# Patient Record
Sex: FEMALE | Race: WHITE | NOT HISPANIC OR LATINO | Employment: FULL TIME | URBAN - METROPOLITAN AREA
[De-identification: names, ages, dates, MRNs, and addresses within clinical notes are randomized per-mention and may not be internally consistent; named-entity substitution may affect disease eponyms.]

---

## 2017-07-19 ENCOUNTER — CLINICAL SUPPORT (OUTPATIENT)
Dept: OCCUPATIONAL MEDICINE | Facility: CLINIC | Age: 22
End: 2017-07-19

## 2017-07-19 DIAGNOSIS — Z00.00 PHYSICAL EXAM: ICD-10-CM

## 2017-07-19 PROCEDURE — 99080 SPECIAL REPORTS OR FORMS: CPT | Mod: S$GLB,,, | Performed by: PREVENTIVE MEDICINE

## 2017-07-19 PROCEDURE — 20999 UNLISTED PX MUSCSKEL GENERAL: CPT | Mod: S$GLB,,, | Performed by: PREVENTIVE MEDICINE

## 2017-07-19 PROCEDURE — 99499 UNLISTED E&M SERVICE: CPT | Mod: S$GLB,,, | Performed by: PREVENTIVE MEDICINE

## 2017-07-19 PROCEDURE — 80305 DRUG TEST PRSMV DIR OPT OBS: CPT | Mod: S$GLB,,, | Performed by: PREVENTIVE MEDICINE

## 2017-07-19 PROCEDURE — 86901 BLOOD TYPING SEROLOGIC RH(D): CPT | Mod: S$GLB,,, | Performed by: PREVENTIVE MEDICINE

## 2017-07-19 PROCEDURE — 92552 PURE TONE AUDIOMETRY AIR: CPT | Mod: S$GLB,,, | Performed by: PREVENTIVE MEDICINE

## 2017-10-19 ENCOUNTER — OFFICE VISIT (OUTPATIENT)
Dept: OBSTETRICS AND GYNECOLOGY | Facility: CLINIC | Age: 22
End: 2017-10-19
Attending: OBSTETRICS & GYNECOLOGY
Payer: COMMERCIAL

## 2017-10-19 VITALS
BODY MASS INDEX: 29.44 KG/M2 | HEIGHT: 69 IN | DIASTOLIC BLOOD PRESSURE: 78 MMHG | SYSTOLIC BLOOD PRESSURE: 110 MMHG | WEIGHT: 198.75 LBS

## 2017-10-19 DIAGNOSIS — Z12.4 ENCOUNTER FOR PAPANICOLAOU SMEAR FOR CERVICAL CANCER SCREENING: ICD-10-CM

## 2017-10-19 DIAGNOSIS — Z01.419 ENCOUNTER FOR GYNECOLOGICAL EXAMINATION (GENERAL) (ROUTINE) WITHOUT ABNORMAL FINDINGS: ICD-10-CM

## 2017-10-19 DIAGNOSIS — Z32.02 PREGNANCY EXAMINATION OR TEST, NEGATIVE RESULT: Primary | ICD-10-CM

## 2017-10-19 DIAGNOSIS — N89.8 VAGINAL DISCHARGE: ICD-10-CM

## 2017-10-19 DIAGNOSIS — R30.0 DYSURIA: ICD-10-CM

## 2017-10-19 LAB
B-HCG UR QL: NEGATIVE
BILIRUB SERPL-MCNC: ABNORMAL MG/DL
BLOOD URINE, POC: ABNORMAL
CANDIDA RRNA VAG QL PROBE: POSITIVE
COLOR, POC UA: YELLOW
CTP QC/QA: YES
G VAGINALIS RRNA GENITAL QL PROBE: NEGATIVE
GLUCOSE UR QL STRIP: NORMAL
KETONES UR QL STRIP: ABNORMAL
LEUKOCYTE ESTERASE URINE, POC: ABNORMAL
NITRITE, POC UA: ABNORMAL
PH, POC UA: 5
PROTEIN, POC: ABNORMAL
SPECIFIC GRAVITY, POC UA: 1.02
T VAGINALIS RRNA GENITAL QL PROBE: NEGATIVE
UROBILINOGEN, POC UA: NORMAL

## 2017-10-19 PROCEDURE — 88175 CYTOPATH C/V AUTO FLUID REDO: CPT

## 2017-10-19 PROCEDURE — 87480 CANDIDA DNA DIR PROBE: CPT

## 2017-10-19 PROCEDURE — 81002 URINALYSIS NONAUTO W/O SCOPE: CPT | Mod: S$GLB,,, | Performed by: OBSTETRICS & GYNECOLOGY

## 2017-10-19 PROCEDURE — 87660 TRICHOMONAS VAGIN DIR PROBE: CPT

## 2017-10-19 PROCEDURE — 99999 PR PBB SHADOW E&M-EST. PATIENT-LVL III: CPT | Mod: PBBFAC,,, | Performed by: OBSTETRICS & GYNECOLOGY

## 2017-10-19 PROCEDURE — 99385 PREV VISIT NEW AGE 18-39: CPT | Mod: 25,S$GLB,, | Performed by: OBSTETRICS & GYNECOLOGY

## 2017-10-19 PROCEDURE — 81025 URINE PREGNANCY TEST: CPT | Mod: S$GLB,,, | Performed by: OBSTETRICS & GYNECOLOGY

## 2017-10-19 RX ORDER — NORETHINDRONE AND ETHINYL ESTRADIOL AND FERROUS FUMARATE 0.8-25(24)
1 KIT ORAL DAILY
Qty: 84 TABLET | Refills: 3 | Status: SHIPPED | OUTPATIENT
Start: 2017-10-19 | End: 2017-12-20 | Stop reason: SDUPTHER

## 2017-10-19 RX ORDER — METRONIDAZOLE 7.5 MG/G
GEL VAGINAL
COMMUNITY
Start: 2015-03-25 | End: 2017-10-19

## 2017-10-19 RX ORDER — NORETHINDRONE AND ETHINYL ESTRADIOL AND FERROUS FUMARATE 0.8-25(24)
KIT ORAL
COMMUNITY
Start: 2014-09-18 | End: 2017-10-19
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
## 2017-10-23 RX ORDER — FLUCONAZOLE 150 MG/1
150 TABLET ORAL DAILY
Qty: 1 TABLET | Refills: 0 | Status: SHIPPED | OUTPATIENT
Start: 2017-10-23 | End: 2017-10-24

## 2017-10-24 ENCOUNTER — TELEPHONE (OUTPATIENT)
Dept: OBSTETRICS AND GYNECOLOGY | Facility: CLINIC | Age: 22
End: 2017-10-24

## 2017-10-24 NOTE — TELEPHONE ENCOUNTER
----- Message from Duyen Haney MD sent at 10/23/2017  5:25 PM CDT -----  Please call patient and tell her....    Your swab for vaginal infections came back positive for a yeast infection. I sent a prescription for Diflucan to the pharmacy for you. If you still have symptoms 1 week after you take the Diflucan or have any other questions then please contact the office.   Sincerely,  Dr. Haney

## 2017-10-26 ENCOUNTER — TELEPHONE (OUTPATIENT)
Dept: OBSTETRICS AND GYNECOLOGY | Facility: CLINIC | Age: 22
End: 2017-10-26

## 2017-10-26 NOTE — TELEPHONE ENCOUNTER
Let pt know she has yeast infection and prescription is at preferred pharmacy. Pt. Verbalized understanding and will call if she has any questions.

## 2017-10-26 NOTE — TELEPHONE ENCOUNTER
----- Message from Mary Olmedo MA sent at 10/24/2017  8:32 AM CDT -----  Please call patient and tell her....     Your swab for vaginal infections came back positive for a yeast infection. I sent a prescription for Diflucan to the pharmacy for you. If you still have symptoms 1 week after you take the Diflucan or have any other questions then please contact the office.   Sincerely,   Dr. Haney

## 2017-11-07 ENCOUNTER — TELEPHONE (OUTPATIENT)
Dept: OBSTETRICS AND GYNECOLOGY | Facility: CLINIC | Age: 22
End: 2017-11-07

## 2017-11-07 NOTE — TELEPHONE ENCOUNTER
Pt states she has been on her period since 10/19.  She is on OCPs that she just started this month.  Reassured her that it is normal to have BTB in the first 3 months of staring a new pill. She takes it at the same time daily and has not missed a pill. Verbalized understanding.

## 2017-11-07 NOTE — TELEPHONE ENCOUNTER
Medina pt - pt said she started her period a few days after her appt with  on 10/19 and it has not stopped. She would like to see why she has not stopped bleeding.

## 2017-12-20 ENCOUNTER — LAB VISIT (OUTPATIENT)
Dept: LAB | Facility: OTHER | Age: 22
End: 2017-12-20
Attending: OBSTETRICS & GYNECOLOGY
Payer: COMMERCIAL

## 2017-12-20 ENCOUNTER — OFFICE VISIT (OUTPATIENT)
Dept: OBSTETRICS AND GYNECOLOGY | Facility: CLINIC | Age: 22
End: 2017-12-20
Attending: OBSTETRICS & GYNECOLOGY
Payer: COMMERCIAL

## 2017-12-20 VITALS
SYSTOLIC BLOOD PRESSURE: 120 MMHG | BODY MASS INDEX: 37.29 KG/M2 | WEIGHT: 202.63 LBS | DIASTOLIC BLOOD PRESSURE: 82 MMHG | HEIGHT: 62 IN

## 2017-12-20 DIAGNOSIS — Z30.41 ENCOUNTER FOR SURVEILLANCE OF CONTRACEPTIVE PILLS: ICD-10-CM

## 2017-12-20 DIAGNOSIS — Z11.3 SCREENING EXAMINATION FOR VENEREAL DISEASE: ICD-10-CM

## 2017-12-20 DIAGNOSIS — Z01.419 ENCOUNTER FOR GYNECOLOGICAL EXAMINATION (GENERAL) (ROUTINE) WITHOUT ABNORMAL FINDINGS: ICD-10-CM

## 2017-12-20 DIAGNOSIS — Z11.3 SCREENING EXAMINATION FOR VENEREAL DISEASE: Primary | ICD-10-CM

## 2017-12-20 PROCEDURE — 86592 SYPHILIS TEST NON-TREP QUAL: CPT

## 2017-12-20 PROCEDURE — 36415 COLL VENOUS BLD VENIPUNCTURE: CPT

## 2017-12-20 PROCEDURE — 86703 HIV-1/HIV-2 1 RESULT ANTBDY: CPT

## 2017-12-20 PROCEDURE — 87340 HEPATITIS B SURFACE AG IA: CPT

## 2017-12-20 PROCEDURE — 86694 HERPES SIMPLEX NES ANTBDY: CPT

## 2017-12-20 PROCEDURE — 87591 N.GONORRHOEAE DNA AMP PROB: CPT

## 2017-12-20 PROCEDURE — 99999 PR PBB SHADOW E&M-EST. PATIENT-LVL III: CPT | Mod: PBBFAC,,, | Performed by: OBSTETRICS & GYNECOLOGY

## 2017-12-20 PROCEDURE — 99212 OFFICE O/P EST SF 10 MIN: CPT | Mod: S$GLB,,, | Performed by: OBSTETRICS & GYNECOLOGY

## 2017-12-20 RX ORDER — NORETHINDRONE AND ETHINYL ESTRADIOL AND FERROUS FUMARATE 0.8-25(24)
1 KIT ORAL DAILY
Qty: 84 TABLET | Refills: 3 | Status: SHIPPED | OUTPATIENT
Start: 2017-12-20 | End: 2018-08-01
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
## 2017-12-21 LAB
C TRACH DNA SPEC QL NAA+PROBE: NOT DETECTED
HBV SURFACE AG SERPL QL IA: NEGATIVE
HIV 1+2 AB+HIV1 P24 AG SERPL QL IA: NEGATIVE
HSV AB, IGM BY EIA: NEGATIVE
N GONORRHOEA DNA SPEC QL NAA+PROBE: NOT DETECTED
RPR SER QL: NORMAL

## 2017-12-22 ENCOUNTER — TELEPHONE (OUTPATIENT)
Dept: OBSTETRICS AND GYNECOLOGY | Facility: CLINIC | Age: 22
End: 2017-12-22

## 2017-12-22 NOTE — TELEPHONE ENCOUNTER
----- Message from Duyen Haney MD sent at 12/22/2017  1:03 PM CST -----  Call patient. Her results are normal. Call with any other problems.

## 2018-04-12 ENCOUNTER — TELEPHONE (OUTPATIENT)
Dept: OBSTETRICS AND GYNECOLOGY | Facility: CLINIC | Age: 23
End: 2018-04-12

## 2018-04-12 DIAGNOSIS — R10.2 PELVIC PAIN IN FEMALE: Primary | ICD-10-CM
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
## 2018-04-19 ENCOUNTER — OFFICE VISIT (OUTPATIENT)
Dept: OBSTETRICS AND GYNECOLOGY | Facility: CLINIC | Age: 23
End: 2018-04-19
Attending: OBSTETRICS & GYNECOLOGY
Payer: COMMERCIAL

## 2018-04-19 VITALS
HEIGHT: 61 IN | BODY MASS INDEX: 39.08 KG/M2 | SYSTOLIC BLOOD PRESSURE: 118 MMHG | WEIGHT: 207 LBS | DIASTOLIC BLOOD PRESSURE: 78 MMHG

## 2018-04-19 DIAGNOSIS — R10.2 FEMALE PELVIC PAIN: Primary | ICD-10-CM

## 2018-04-19 PROCEDURE — 99999 PR PBB SHADOW E&M-EST. PATIENT-LVL III: CPT | Mod: PBBFAC,,, | Performed by: OBSTETRICS & GYNECOLOGY

## 2018-04-19 PROCEDURE — 99212 OFFICE O/P EST SF 10 MIN: CPT | Mod: S$GLB,,, | Performed by: OBSTETRICS & GYNECOLOGY

## 2018-04-19 RX ORDER — LISDEXAMFETAMINE DIMESYLATE 40 MG/1
CAPSULE ORAL
COMMUNITY
Start: 2018-03-21 | End: 2018-08-01
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
## 2018-04-23 ENCOUNTER — TELEPHONE (OUTPATIENT)
Dept: OBSTETRICS AND GYNECOLOGY | Facility: CLINIC | Age: 23
End: 2018-04-23

## 2018-04-23 NOTE — TELEPHONE ENCOUNTER
Called pt to let her know the doctor's office that we referred her to will be calling her later today to set up an appt. Pt verbalized understanding.

## 2018-06-06 ENCOUNTER — CLINICAL SUPPORT (OUTPATIENT)
Dept: OTHER | Facility: CLINIC | Age: 23
End: 2018-06-06
Payer: COMMERCIAL

## 2018-06-06 DIAGNOSIS — Z00.8 HEALTH EXAMINATION IN POPULATION SURVEYS: Primary | ICD-10-CM

## 2018-06-06 PROCEDURE — 99401 PREV MED CNSL INDIV APPRX 15: CPT | Mod: S$GLB,,, | Performed by: INTERNAL MEDICINE

## 2018-06-06 PROCEDURE — 82947 ASSAY GLUCOSE BLOOD QUANT: CPT | Mod: QW,S$GLB,, | Performed by: INTERNAL MEDICINE

## 2018-06-06 PROCEDURE — 80061 LIPID PANEL: CPT | Mod: QW,S$GLB,, | Performed by: INTERNAL MEDICINE

## 2018-06-07 VITALS
DIASTOLIC BLOOD PRESSURE: 70 MMHG | SYSTOLIC BLOOD PRESSURE: 118 MMHG | HEIGHT: 64 IN | BODY MASS INDEX: 33.63 KG/M2 | WEIGHT: 197 LBS

## 2018-06-07 LAB
GLUCOSE SERPL-MCNC: 84 MG/DL (ref 60–140)
POC CHOLESTEROL, HDL: 44 MG/DL (ref 40–?)
POC CHOLESTEROL, LDL: 108 MG/DL (ref ?–160)
POC CHOLESTEROL, TOTAL: 190 MG/DL (ref ?–240)
POC GLUCOSE FASTING: ABNORMAL MG/DL (ref 60–110)
POC TOTAL CHOLESTEROL / HDL RATIO: 4.32 (ref ?–6)
POC TRIGLYCERIDES: 186 MG/DL (ref ?–160)

## 2018-08-01 ENCOUNTER — OFFICE VISIT (OUTPATIENT)
Dept: OBSTETRICS AND GYNECOLOGY | Facility: CLINIC | Age: 23
End: 2018-08-01
Payer: COMMERCIAL

## 2018-08-01 VITALS
SYSTOLIC BLOOD PRESSURE: 130 MMHG | BODY MASS INDEX: 37 KG/M2 | HEIGHT: 61 IN | WEIGHT: 196 LBS | DIASTOLIC BLOOD PRESSURE: 80 MMHG

## 2018-08-01 DIAGNOSIS — N92.6 MISSED MENSES: Primary | ICD-10-CM

## 2018-08-01 DIAGNOSIS — Z34.90 PREGNANCY, UNSPECIFIED GESTATIONAL AGE: ICD-10-CM

## 2018-08-01 DIAGNOSIS — Z32.01 POSITIVE URINE PREGNANCY TEST: ICD-10-CM

## 2018-08-01 LAB
B-HCG UR QL: POSITIVE
CTP QC/QA: YES

## 2018-08-01 PROCEDURE — 3008F BODY MASS INDEX DOCD: CPT | Mod: CPTII,S$GLB,, | Performed by: NURSE PRACTITIONER

## 2018-08-01 PROCEDURE — 99999 PR PBB SHADOW E&M-EST. PATIENT-LVL III: CPT | Mod: PBBFAC,,, | Performed by: NURSE PRACTITIONER

## 2018-08-01 PROCEDURE — 99214 OFFICE O/P EST MOD 30 MIN: CPT | Mod: 25,S$GLB,, | Performed by: NURSE PRACTITIONER

## 2018-08-01 PROCEDURE — 81025 URINE PREGNANCY TEST: CPT | Mod: S$GLB,,, | Performed by: NURSE PRACTITIONER

## 2018-08-01 RX ORDER — LISDEXAMFETAMINE DIMESYLATE 40 MG/1
CAPSULE ORAL
COMMUNITY
End: 2018-08-17
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
## 2018-08-10 ENCOUNTER — INITIAL PRENATAL (OUTPATIENT)
Dept: OBSTETRICS AND GYNECOLOGY | Facility: CLINIC | Age: 23
End: 2018-08-10
Attending: STUDENT IN AN ORGANIZED HEALTH CARE EDUCATION/TRAINING PROGRAM
Payer: COMMERCIAL

## 2018-08-10 ENCOUNTER — PROCEDURE VISIT (OUTPATIENT)
Dept: OBSTETRICS AND GYNECOLOGY | Facility: CLINIC | Age: 23
End: 2018-08-10
Payer: COMMERCIAL

## 2018-08-10 VITALS — SYSTOLIC BLOOD PRESSURE: 128 MMHG | DIASTOLIC BLOOD PRESSURE: 74 MMHG

## 2018-08-10 DIAGNOSIS — Z3A.01 LESS THAN 8 WEEKS GESTATION OF PREGNANCY: Primary | ICD-10-CM

## 2018-08-10 DIAGNOSIS — Z34.90 PREGNANCY, UNSPECIFIED GESTATIONAL AGE: ICD-10-CM

## 2018-08-10 DIAGNOSIS — O30.031 MONOCHORIONIC DIAMNIOTIC TWIN GESTATION IN FIRST TRIMESTER: ICD-10-CM

## 2018-08-10 PROCEDURE — 99999 PR PBB SHADOW E&M-EST. PATIENT-LVL II: CPT | Mod: PBBFAC,,, | Performed by: STUDENT IN AN ORGANIZED HEALTH CARE EDUCATION/TRAINING PROGRAM

## 2018-08-10 PROCEDURE — 76817 TRANSVAGINAL US OBSTETRIC: CPT | Mod: S$GLB,,, | Performed by: OBSTETRICS & GYNECOLOGY

## 2018-08-10 PROCEDURE — 76801 OB US < 14 WKS SINGLE FETUS: CPT | Mod: S$GLB,,, | Performed by: OBSTETRICS & GYNECOLOGY

## 2018-08-10 PROCEDURE — 0500F INITIAL PRENATAL CARE VISIT: CPT | Mod: S$GLB,,, | Performed by: STUDENT IN AN ORGANIZED HEALTH CARE EDUCATION/TRAINING PROGRAM

## 2018-08-10 PROCEDURE — 87491 CHLMYD TRACH DNA AMP PROBE: CPT

## 2018-08-10 PROCEDURE — 87086 URINE CULTURE/COLONY COUNT: CPT

## 2018-08-10 NOTE — PROGRESS NOTES
Chief Complaint: Initial OB Visit     HPI:      Nina Montesinos is a 23 y.o.  who presents for initial OB visit.  Denies any cramping or bleeding.  Minimal nausea.      Patient again today expresses concerns over whether she desires to keep the pregnancy or not.  She is concerned about paternity.    Past Medical History:   Diagnosis Date    Rectal bleeding     rectal bleeding when trying to defacate - pt having surgery on May 8th, 2018     Past Surgical History:   Procedure Laterality Date    [place] TOTAL KNEE ARTHROPLASTY Bilateral     Knee Replacement, Total    TONSILLECTOMY N/A      Social History   Substance Use Topics    Smoking status: Former Smoker    Smokeless tobacco: Never Used    Alcohol use Yes      Comment: social      Family History   Problem Relation Age of Onset    Breast cancer Maternal Grandmother     Stroke Maternal Grandfather     Hypertension Mother     Colon cancer Neg Hx     Diabetes Neg Hx     Ovarian cancer Neg Hx      OB History    Para Term  AB Living   1 0 0 0 0 0   SAB TAB Ectopic Multiple Live Births   0 0 0 0 0      # Outcome Date GA Lbr [place]/2nd Weight Sex Delivery Anes PTL Lv   1 Current                   ROS:     GENERAL: Denies weight gain or weight loss. Feeling well overall.   SKIN: Denies rash or lesions.   BREASTS: Denies lumps or nipple discharge. + tenderness.  ABDOMEN: Denies abdominal pain, constipation, diarrhea. no nausea/no vomiting  URINARY: Denies dysuria, hematuria. + frequency.  NEUROLOGIC: Denies syncope or weakness.   PSYCHIATRIC: Denies depression, anxiety or mood swings.    Physical Exam:      PHYSICAL EXAM:  /74   LMP 2018 (Approximate)   There is no height or weight on file to calculate BMI.     APPEARANCE: Well nourished, well developed, in no acute distress.  PSYCH: Appropriate mood and affect.  NECK:  Supple, no thyromegaly.    Assessment/Plan:       ICD-10-CM ICD-9-CM    1. Less than 8 weeks gestation of pregnancy  Z3A.01 V22.2 C. trachomatis/N. gonorrhoeae by AMP DNA      Urine culture         Counselin. Prenatal labs ordered - will follow up results.  Continue prenatal vitamins.  All questions answered.  2. Patient again counseled on pregnancy options and all questions answered.  Counseling resources also given.  Discussed paternity options and testing available following delivery.  All questions answered.  3. Normal course of prenatal care reviewed.  4. RTC in 2 weeks weeks or sooner if needed.    Use of the [employer] Patient Portal discussed and encouraged during today's visit.

## 2018-08-10 NOTE — Clinical Note
Assumption di twins on U/S.  She is undecided on whether or not she desires to continue pregnancy.  Counseling and resources discussed.

## 2018-08-11 LAB
BACTERIA UR CULT: NO GROWTH
C TRACH DNA SPEC QL NAA+PROBE: NOT DETECTED
N GONORRHOEA DNA SPEC QL NAA+PROBE: NOT DETECTED

## 2018-08-14 ENCOUNTER — TELEPHONE (OUTPATIENT)
Dept: OBSTETRICS AND GYNECOLOGY | Facility: CLINIC | Age: 23
End: 2018-08-14

## 2018-08-14 NOTE — TELEPHONE ENCOUNTER
Phone call made to patient to review U/S results and discuss MFM recommendation for repeat scan with them in clinic.  No answer.  Voicemail left to return MD phone call.

## 2018-08-15 ENCOUNTER — TELEPHONE (OUTPATIENT)
Dept: OBSTETRICS AND GYNECOLOGY | Facility: CLINIC | Age: 23
End: 2018-08-15

## 2018-08-15 DIAGNOSIS — Z3A.08 8 WEEKS GESTATION OF PREGNANCY: Primary | ICD-10-CM

## 2018-08-17 ENCOUNTER — ROUTINE PRENATAL (OUTPATIENT)
Dept: OBSTETRICS AND GYNECOLOGY | Facility: CLINIC | Age: 23
End: 2018-08-17
Payer: COMMERCIAL

## 2018-08-17 VITALS — DIASTOLIC BLOOD PRESSURE: 68 MMHG | SYSTOLIC BLOOD PRESSURE: 108 MMHG | WEIGHT: 197 LBS | BODY MASS INDEX: 37.22 KG/M2

## 2018-08-17 DIAGNOSIS — Z34.01 ENCOUNTER FOR SUPERVISION OF NORMAL FIRST PREGNANCY IN FIRST TRIMESTER: Primary | ICD-10-CM

## 2018-08-17 PROCEDURE — 0502F SUBSEQUENT PRENATAL CARE: CPT | Mod: S$GLB,,, | Performed by: OBSTETRICS & GYNECOLOGY

## 2018-08-17 PROCEDURE — 99999 PR PBB SHADOW E&M-EST. PATIENT-LVL II: CPT | Mod: PBBFAC,,, | Performed by: OBSTETRICS & GYNECOLOGY

## 2018-08-17 RX ORDER — ONDANSETRON 4 MG/1
4 TABLET, ORALLY DISINTEGRATING ORAL EVERY 8 HOURS PRN
Qty: 30 TABLET | Refills: 1 | Status: ON HOLD | OUTPATIENT
Start: 2018-08-17 | End: 2018-12-11 | Stop reason: HOSPADM

## 2018-08-17 RX ORDER — CITALOPRAM 10 MG/1
10 TABLET ORAL DAILY
Qty: 30 TABLET | Refills: 11 | Status: SHIPPED | OUTPATIENT
Start: 2018-08-17 | End: 2018-09-27

## 2018-08-17 NOTE — PROGRESS NOTES
Doing ok. Has been going back and forth about whether or not she wants to terminate pregnancy. She says now that she knows its twins she really wants to keep it. Partner does not. Mom is supportive. Pt decided to not terminate. All questions answered. Says increased anxiety, wants meds. Will start low dose Celexa. Explained in detail. No Si, no HI.

## 2018-08-21 ENCOUNTER — OFFICE VISIT (OUTPATIENT)
Dept: MATERNAL FETAL MEDICINE | Facility: CLINIC | Age: 23
End: 2018-08-21
Payer: COMMERCIAL

## 2018-08-21 DIAGNOSIS — Z36.9 ENCOUNTER FOR FETAL ULTRASOUND: Primary | ICD-10-CM

## 2018-08-21 DIAGNOSIS — O30.039 MONOCHORIONIC DIAMNIOTIC TWIN PREGNANCY, ANTEPARTUM: ICD-10-CM

## 2018-08-21 PROCEDURE — 99499 UNLISTED E&M SERVICE: CPT | Mod: S$GLB,,, | Performed by: OBSTETRICS & GYNECOLOGY

## 2018-08-21 PROCEDURE — 99999 PR PBB SHADOW E&M-EST. PATIENT-LVL I: CPT | Mod: PBBFAC,,,

## 2018-08-21 PROCEDURE — 76817 TRANSVAGINAL US OBSTETRIC: CPT | Mod: S$GLB,,, | Performed by: OBSTETRICS & GYNECOLOGY

## 2018-08-21 NOTE — PROGRESS NOTES
Monochorionic diamniotic twin gestation.  See ultrasound report in imaging tab for additional details.

## 2018-09-05 ENCOUNTER — LAB VISIT (OUTPATIENT)
Dept: LAB | Facility: OTHER | Age: 23
End: 2018-09-05
Attending: OBSTETRICS & GYNECOLOGY
Payer: COMMERCIAL

## 2018-09-05 ENCOUNTER — ROUTINE PRENATAL (OUTPATIENT)
Dept: OBSTETRICS AND GYNECOLOGY | Facility: CLINIC | Age: 23
End: 2018-09-05
Attending: OBSTETRICS & GYNECOLOGY
Payer: COMMERCIAL

## 2018-09-05 VITALS
BODY MASS INDEX: 37.37 KG/M2 | SYSTOLIC BLOOD PRESSURE: 106 MMHG | WEIGHT: 197.75 LBS | DIASTOLIC BLOOD PRESSURE: 60 MMHG

## 2018-09-05 DIAGNOSIS — Z3A.11 11 WEEKS GESTATION OF PREGNANCY: Primary | ICD-10-CM

## 2018-09-05 DIAGNOSIS — Z3A.11 11 WEEKS GESTATION OF PREGNANCY: ICD-10-CM

## 2018-09-05 LAB
ABO + RH BLD: NORMAL
ANION GAP SERPL CALC-SCNC: 10 MMOL/L
BASOPHILS # BLD AUTO: 0.02 K/UL
BASOPHILS NFR BLD: 0.2 %
BLD GP AB SCN CELLS X3 SERPL QL: NORMAL
BUN SERPL-MCNC: 7 MG/DL
CALCIUM SERPL-MCNC: 9.4 MG/DL
CHLORIDE SERPL-SCNC: 104 MMOL/L
CO2 SERPL-SCNC: 23 MMOL/L
CREAT SERPL-MCNC: 0.7 MG/DL
DIFFERENTIAL METHOD: NORMAL
EOSINOPHIL # BLD AUTO: 0.1 K/UL
EOSINOPHIL NFR BLD: 0.6 %
ERYTHROCYTE [DISTWIDTH] IN BLOOD BY AUTOMATED COUNT: 12.7 %
EST. GFR  (AFRICAN AMERICAN): >60 ML/MIN/1.73 M^2
EST. GFR  (NON AFRICAN AMERICAN): >60 ML/MIN/1.73 M^2
GLUCOSE SERPL-MCNC: 76 MG/DL
GLUCOSE SERPL-MCNC: 76 MG/DL
HCT VFR BLD AUTO: 39.2 %
HGB BLD-MCNC: 13.4 G/DL
LYMPHOCYTES # BLD AUTO: 2 K/UL
LYMPHOCYTES NFR BLD: 21.9 %
MCH RBC QN AUTO: 30 PG
MCHC RBC AUTO-ENTMCNC: 34.2 G/DL
MCV RBC AUTO: 88 FL
MONOCYTES # BLD AUTO: 0.6 K/UL
MONOCYTES NFR BLD: 6.8 %
NEUTROPHILS # BLD AUTO: 6.5 K/UL
NEUTROPHILS NFR BLD: 69.9 %
PLATELET # BLD AUTO: 242 K/UL
PMV BLD AUTO: 11.2 FL
POTASSIUM SERPL-SCNC: 4 MMOL/L
RBC # BLD AUTO: 4.47 M/UL
SODIUM SERPL-SCNC: 137 MMOL/L
TSH SERPL DL<=0.005 MIU/L-ACNC: 0.72 UIU/ML
WBC # BLD AUTO: 9.33 K/UL

## 2018-09-05 PROCEDURE — 86762 RUBELLA ANTIBODY: CPT

## 2018-09-05 PROCEDURE — 87340 HEPATITIS B SURFACE AG IA: CPT

## 2018-09-05 PROCEDURE — 85025 COMPLETE CBC W/AUTO DIFF WBC: CPT

## 2018-09-05 PROCEDURE — 0502F SUBSEQUENT PRENATAL CARE: CPT | Mod: S$GLB,,, | Performed by: OBSTETRICS & GYNECOLOGY

## 2018-09-05 PROCEDURE — 86703 HIV-1/HIV-2 1 RESULT ANTBDY: CPT

## 2018-09-05 PROCEDURE — 99999 PR PBB SHADOW E&M-EST. PATIENT-LVL III: CPT | Mod: PBBFAC,,, | Performed by: OBSTETRICS & GYNECOLOGY

## 2018-09-05 PROCEDURE — 87086 URINE CULTURE/COLONY COUNT: CPT

## 2018-09-05 PROCEDURE — 36415 COLL VENOUS BLD VENIPUNCTURE: CPT

## 2018-09-05 PROCEDURE — 86592 SYPHILIS TEST NON-TREP QUAL: CPT

## 2018-09-05 PROCEDURE — 86901 BLOOD TYPING SEROLOGIC RH(D): CPT

## 2018-09-05 PROCEDURE — 80048 BASIC METABOLIC PNL TOTAL CA: CPT

## 2018-09-05 PROCEDURE — 87491 CHLMYD TRACH DNA AMP PROBE: CPT

## 2018-09-05 PROCEDURE — 84443 ASSAY THYROID STIM HORMONE: CPT

## 2018-09-05 NOTE — PROGRESS NOTES
+FHT x 2 seen on Vscan. All questions answered. C/o HA, rec Mag Ox daily, tylenol and caffeine prn. All questions answered. Wants [id number]. Labs today.

## 2018-09-06 LAB
C TRACH DNA SPEC QL NAA+PROBE: NOT DETECTED
HBV SURFACE AG SERPL QL IA: NEGATIVE
HIV 1+2 AB+HIV1 P24 AG SERPL QL IA: NEGATIVE
N GONORRHOEA DNA SPEC QL NAA+PROBE: NOT DETECTED
RPR SER QL: NORMAL
RUBV IGG SER-ACNC: 140 IU/ML
RUBV IGG SER-IMP: REACTIVE

## 2018-09-06 NOTE — PROGRESS NOTES
Call patient. Her OB lab results are normal. The genetic test taks 7- 10 days. We will call when that comes back. Encourage her to sign up for portal since she is pregnant. It will be easier to communicateCall with any other problems.

## 2018-09-07 ENCOUNTER — TELEPHONE (OUTPATIENT)
Dept: OBSTETRICS AND GYNECOLOGY | Facility: CLINIC | Age: 23
End: 2018-09-07

## 2018-09-07 LAB
BACTERIA UR CULT: NORMAL
BACTERIA UR CULT: NORMAL

## 2018-09-07 NOTE — TELEPHONE ENCOUNTER
Anne with [employer] is calling. And they need a Dx code wasn't sent with one. # [phone]

## 2018-09-10 ENCOUNTER — TELEPHONE (OUTPATIENT)
Dept: OBSTETRICS AND GYNECOLOGY | Facility: CLINIC | Age: 23
End: 2018-09-10

## 2018-09-10 NOTE — TELEPHONE ENCOUNTER
----- Message from Mary Olmedo MA sent at 9/6/2018  5:03 PM CDT -----  MD STEFAN Mustafa Staff         Call patient. Her OB lab results are normal. The genetic test taks 7- 10 days. We will call when that comes back. Encourage her to sign up for portal since she is pregnant. It will be easier to communicateCall with any other problems.

## 2018-09-11 NOTE — TELEPHONE ENCOUNTER
Called pt back and she gave me her best friend's phone number to give gender of babies to for gender reveal party.

## 2018-09-24 ENCOUNTER — TELEPHONE (OUTPATIENT)
Dept: OBSTETRICS AND GYNECOLOGY | Facility: CLINIC | Age: 23
End: 2018-09-24

## 2018-09-24 ENCOUNTER — ROUTINE PRENATAL (OUTPATIENT)
Dept: OBSTETRICS AND GYNECOLOGY | Facility: CLINIC | Age: 23
End: 2018-09-24
Payer: COMMERCIAL

## 2018-09-24 ENCOUNTER — PROCEDURE VISIT (OUTPATIENT)
Dept: OBSTETRICS AND GYNECOLOGY | Facility: CLINIC | Age: 23
End: 2018-09-24
Attending: OBSTETRICS & GYNECOLOGY
Payer: COMMERCIAL

## 2018-09-24 VITALS
WEIGHT: 198.44 LBS | DIASTOLIC BLOOD PRESSURE: 82 MMHG | SYSTOLIC BLOOD PRESSURE: 124 MMHG | BODY MASS INDEX: 37.49 KG/M2

## 2018-09-24 DIAGNOSIS — R10.2 SUPRAPUBIC PRESSURE: ICD-10-CM

## 2018-09-24 DIAGNOSIS — R10.9 CRAMPING AFFECTING PREGNANCY, ANTEPARTUM: ICD-10-CM

## 2018-09-24 DIAGNOSIS — O26.899 CRAMPING AFFECTING PREGNANCY, ANTEPARTUM: ICD-10-CM

## 2018-09-24 DIAGNOSIS — R82.90 ABNORMAL FINDING IN URINE: ICD-10-CM

## 2018-09-24 DIAGNOSIS — N89.8 VAGINAL DISCHARGE: ICD-10-CM

## 2018-09-24 DIAGNOSIS — O30.031 MONOCHORIONIC DIAMNIOTIC TWIN GESTATION IN FIRST TRIMESTER: ICD-10-CM

## 2018-09-24 DIAGNOSIS — Z3A.13 13 WEEKS GESTATION OF PREGNANCY: Primary | ICD-10-CM

## 2018-09-24 DIAGNOSIS — O26.899 CRAMPING AFFECTING PREGNANCY, ANTEPARTUM: Primary | ICD-10-CM

## 2018-09-24 DIAGNOSIS — R10.9 CRAMPING AFFECTING PREGNANCY, ANTEPARTUM: Primary | ICD-10-CM

## 2018-09-24 LAB
CANDIDA RRNA VAG QL PROBE: NEGATIVE
G VAGINALIS RRNA GENITAL QL PROBE: NEGATIVE
T VAGINALIS RRNA GENITAL QL PROBE: NEGATIVE

## 2018-09-24 PROCEDURE — 0502F SUBSEQUENT PRENATAL CARE: CPT | Mod: S$GLB,,, | Performed by: NURSE PRACTITIONER

## 2018-09-24 PROCEDURE — 76801 OB US < 14 WKS SINGLE FETUS: CPT | Mod: S$GLB,,, | Performed by: OBSTETRICS & GYNECOLOGY

## 2018-09-24 PROCEDURE — 76802 OB US < 14 WKS ADDL FETUS: CPT | Mod: S$GLB,,, | Performed by: OBSTETRICS & GYNECOLOGY

## 2018-09-24 PROCEDURE — 87510 GARDNER VAG DNA DIR PROBE: CPT

## 2018-09-24 PROCEDURE — 87086 URINE CULTURE/COLONY COUNT: CPT

## 2018-09-24 PROCEDURE — 87480 CANDIDA DNA DIR PROBE: CPT

## 2018-09-24 PROCEDURE — 99999 PR PBB SHADOW E&M-EST. PATIENT-LVL III: CPT | Mod: PBBFAC,,, | Performed by: NURSE PRACTITIONER

## 2018-09-24 RX ORDER — NITROFURANTOIN 25; 75 MG/1; MG/1
100 CAPSULE ORAL 2 TIMES DAILY
Qty: 14 CAPSULE | Refills: 0 | Status: SHIPPED | OUTPATIENT
Start: 2018-09-24 | End: 2018-09-27

## 2018-09-24 NOTE — TELEPHONE ENCOUNTER
13 6/7 week OB c/o cramping on right side and back pain.  She has been taking Tylenol 1gm every 6 hours without relief.  Nothing makes it better or worse.  Reports adequate hydration and denies vaginal bleeding.     Scheduled US and appt with Bonnie today at 1430.  Dr. Haney, there were no USs available in [place].

## 2018-09-24 NOTE — PROGRESS NOTES
Presents today with c/o low back pain (near sacrum, that sometimes radiates down buttocks) for past 3-4 days.  Also reports cramps to low abdomen (suprapubic area).  Denies intercourse or any vag bleeding in past 48 hours.  Denies vag d/c or odor.  Denies urinary s/s.  No CVA tenderness noted.    U/S done today in-office:   (* See report in Epic*)  Pelvic exam:  External genitalia without lesions or erythema; vagina pink and without lesions; small amt light yellow discharge noted.  Cervix closed/thick/high.  ** Has appt with [facility] on 10/10/18 for follow-up ultrasound.   Plan:  1.  Affirm & Urine C&S collected  2.  Macrobid  rx sent  3. Will call with results as they are finalized.  4. Bleeding/cramping/severe pain prec given.

## 2018-09-25 NOTE — PROGRESS NOTES
Ariel Wood,   Your vaginal swab from the office came back negative.  This was a test for a yeast infection or a bacterial infection.  Your swab was normal.  Please call the office if you have any other questions.    Your urine culture is still in-process, so I will let you know once I get the final results.    Sincerely,   AMILCAR Adorno

## 2018-09-26 ENCOUNTER — TELEPHONE (OUTPATIENT)
Dept: OBSTETRICS AND GYNECOLOGY | Facility: CLINIC | Age: 23
End: 2018-09-26

## 2018-09-26 LAB — BACTERIA UR CULT: NORMAL

## 2018-09-26 NOTE — TELEPHONE ENCOUNTER
Dr Haney pt calling, ob has a urine and kidney test sent off to the lab., she wants to know if they are back yet and what the results are.Pt # [phone]

## 2018-09-26 NOTE — TELEPHONE ENCOUNTER
----- Message from Bonnie Eagle NP sent at 9/26/2018  3:18 PM CDT -----  Call pt and tell her urine culture negative.  (She can stop taking the Macrobid)

## 2018-09-27 ENCOUNTER — ROUTINE PRENATAL (OUTPATIENT)
Dept: OBSTETRICS AND GYNECOLOGY | Facility: CLINIC | Age: 23
End: 2018-09-27
Attending: OBSTETRICS & GYNECOLOGY
Payer: COMMERCIAL

## 2018-09-27 ENCOUNTER — TELEPHONE (OUTPATIENT)
Dept: OBSTETRICS AND GYNECOLOGY | Facility: CLINIC | Age: 23
End: 2018-09-27

## 2018-09-27 VITALS — DIASTOLIC BLOOD PRESSURE: 70 MMHG | SYSTOLIC BLOOD PRESSURE: 116 MMHG

## 2018-09-27 DIAGNOSIS — F41.1 GAD (GENERALIZED ANXIETY DISORDER): Primary | ICD-10-CM

## 2018-09-27 DIAGNOSIS — M54.9 BACK PAIN AFFECTING PREGNANCY IN SECOND TRIMESTER: ICD-10-CM

## 2018-09-27 DIAGNOSIS — O99.891 BACK PAIN AFFECTING PREGNANCY IN SECOND TRIMESTER: Primary | ICD-10-CM

## 2018-09-27 DIAGNOSIS — O99.891 BACK PAIN AFFECTING PREGNANCY IN SECOND TRIMESTER: ICD-10-CM

## 2018-09-27 DIAGNOSIS — M54.9 BACK PAIN AFFECTING PREGNANCY IN SECOND TRIMESTER: Primary | ICD-10-CM

## 2018-09-27 DIAGNOSIS — M54.32 SCIATIC PAIN, LEFT: ICD-10-CM

## 2018-09-27 PROCEDURE — 76815 OB US LIMITED FETUS(S): CPT | Mod: S$GLB,,, | Performed by: OBSTETRICS & GYNECOLOGY

## 2018-09-27 PROCEDURE — 0502F SUBSEQUENT PRENATAL CARE: CPT | Mod: S$GLB,,, | Performed by: OBSTETRICS & GYNECOLOGY

## 2018-09-27 PROCEDURE — 99999 PR PBB SHADOW E&M-EST. PATIENT-LVL III: CPT | Mod: PBBFAC,,, | Performed by: OBSTETRICS & GYNECOLOGY

## 2018-09-27 RX ORDER — CITALOPRAM 20 MG/1
20 TABLET, FILM COATED ORAL DAILY
Qty: 30 TABLET | Refills: 11 | Status: SHIPPED | OUTPATIENT
Start: 2018-09-27 | End: 2018-11-21

## 2018-09-27 NOTE — TELEPHONE ENCOUNTER
14 2/7 week OB c/o severe back pain.  Worse in the evening, when walking and with movement.  More on left side.  She has tried Tylenol, Thermacare patches, heating pad and massage with minimal or only temporary relief.  Mother states pt is crying and moaning with every step she takes.  She saw Bonnie Monday for cramping and back pain.  R/o UTI and vaginal infection.

## 2018-09-27 NOTE — PROCEDURES
Obstetrical ultrasound completed today.  See report in imaging section of [facility].

## 2018-09-27 NOTE — PROGRESS NOTES
Patient's mom called this morning reporting Nina is in severe pain in her back, crying in pain. I recommend she come for visit. No vaginal bleeding. U/s shows normal cervical length, FHT x 2. After hearing her describe the pain, I am certain this is left sided sciatic pain. Described as starting in the buttock area and shooting down the back of leg like a knife stabbing me in the leg. Worse with sitting and sudden movements. Went to chiropractor yesterday and had some relief.     We also discussed in detail her most recent u/s which showed a discordance in size of mono/di twins. I explained in detail what twin to twin transfusion is and how it is too early to really diagnose it yet but based on some of the measurements on her last u/s it is suspicious for it. Mom and pt understand. Mom says she say on the screen and could tell that one was smaller. Aware that worst case scenario it means the babies would not survive. All questions answered. Talk x 15 min

## 2018-09-27 NOTE — TELEPHONE ENCOUNTER
Dr Haney pt's mom calling Tila, the pt is Ob 13wks Twins and having really bad pelvic pain and mom is very worried for her and wants to speak with someone. Tila [phone]

## 2018-09-27 NOTE — TELEPHONE ENCOUNTER
Have her come now to [facility] to see me in the office. If her pain is severe to where she can barely move then just go to the ER. Can't do [facility] because not far enough along  Is there an open 10 am spot on [facility] u/s. Hard for me to tell

## 2018-10-02 ENCOUNTER — TELEPHONE (OUTPATIENT)
Dept: ADMINISTRATIVE | Facility: OTHER | Age: 23
End: 2018-10-02

## 2018-10-02 NOTE — TELEPHONE ENCOUNTER
Patient needs a doctors note for 09/24/2018 and 09/27/2018 to be faxed over to her job. Fax number for her job is [phone].

## 2018-10-02 NOTE — LETTER
October 2, 2018      [facility]  [address]  [facility] [address]       Patient: Nina Montesinos   YOB: 1995    To Whom It May Concern:    Nina Montesinos was at [facility] on 9/24/18 and 9/27/18 for complications during pregnancy. She is able to be at work with no restrictions now.If you have any questions or concerns, or if I can be of further assistance, please do not hesitate to contact me.    Sincerely,        Duyen Haney MD

## 2018-10-05 ENCOUNTER — ROUTINE PRENATAL (OUTPATIENT)
Dept: OBSTETRICS AND GYNECOLOGY | Facility: CLINIC | Age: 23
End: 2018-10-05
Payer: COMMERCIAL

## 2018-10-05 VITALS
BODY MASS INDEX: 37.07 KG/M2 | WEIGHT: 196.19 LBS | DIASTOLIC BLOOD PRESSURE: 80 MMHG | SYSTOLIC BLOOD PRESSURE: 116 MMHG

## 2018-10-05 DIAGNOSIS — O30.032 MONOCHORIONIC DIAMNIOTIC TWIN GESTATION IN SECOND TRIMESTER: Primary | ICD-10-CM

## 2018-10-05 PROCEDURE — 90686 IIV4 VACC NO PRSV 0.5 ML IM: CPT | Mod: S$GLB,,, | Performed by: OBSTETRICS & GYNECOLOGY

## 2018-10-05 PROCEDURE — 0502F SUBSEQUENT PRENATAL CARE: CPT | Mod: S$GLB,,, | Performed by: OBSTETRICS & GYNECOLOGY

## 2018-10-05 PROCEDURE — 90471 IMMUNIZATION ADMIN: CPT | Mod: S$GLB,,, | Performed by: OBSTETRICS & GYNECOLOGY

## 2018-10-05 PROCEDURE — 99999 PR PBB SHADOW E&M-EST. PATIENT-LVL III: CPT | Mod: PBBFAC,,, | Performed by: OBSTETRICS & GYNECOLOGY

## 2018-10-05 NOTE — PROGRESS NOTES
Bedside u/s- FHT x 2  Has MFM appt next week  Sciatic pain is better  All questions answered  We discussed possible twin to twin and will be evaluated next week at 16 weeks by MFM

## 2018-10-10 ENCOUNTER — OFFICE VISIT (OUTPATIENT)
Dept: MATERNAL FETAL MEDICINE | Facility: CLINIC | Age: 23
End: 2018-10-10
Attending: OBSTETRICS & GYNECOLOGY
Payer: COMMERCIAL

## 2018-10-10 DIAGNOSIS — O30.039 MONOCHORIONIC DIAMNIOTIC TWIN PREGNANCY, ANTEPARTUM: ICD-10-CM

## 2018-10-10 DIAGNOSIS — O30.032 MONOCHORIONIC DIAMNIOTIC TWIN GESTATION IN SECOND TRIMESTER: ICD-10-CM

## 2018-10-10 DIAGNOSIS — Z36.4 ANTENATAL SCREENING FOR FETAL GROWTH RETARDATION USING ULTRASONICS: ICD-10-CM

## 2018-10-10 PROCEDURE — 99999 PR PBB SHADOW E&M-EST. PATIENT-LVL I: CPT | Mod: PBBFAC,,, | Performed by: OBSTETRICS & GYNECOLOGY

## 2018-10-10 PROCEDURE — 99214 OFFICE O/P EST MOD 30 MIN: CPT | Mod: 25,S$GLB,, | Performed by: OBSTETRICS & GYNECOLOGY

## 2018-10-10 PROCEDURE — 76816 OB US FOLLOW-UP PER FETUS: CPT | Mod: 26,S$GLB,, | Performed by: OBSTETRICS & GYNECOLOGY

## 2018-10-13 ENCOUNTER — PATIENT MESSAGE (OUTPATIENT)
Dept: OBSTETRICS AND GYNECOLOGY | Facility: CLINIC | Age: 23
End: 2018-10-13

## 2018-10-16 ENCOUNTER — ROUTINE PRENATAL (OUTPATIENT)
Dept: OBSTETRICS AND GYNECOLOGY | Facility: CLINIC | Age: 23
End: 2018-10-16
Payer: COMMERCIAL

## 2018-10-16 ENCOUNTER — TELEPHONE (OUTPATIENT)
Dept: OBSTETRICS AND GYNECOLOGY | Facility: CLINIC | Age: 23
End: 2018-10-16

## 2018-10-16 ENCOUNTER — INITIAL CONSULT (OUTPATIENT)
Dept: MATERNAL FETAL MEDICINE | Facility: CLINIC | Age: 23
End: 2018-10-16
Payer: COMMERCIAL

## 2018-10-16 ENCOUNTER — PROCEDURE VISIT (OUTPATIENT)
Dept: MATERNAL FETAL MEDICINE | Facility: CLINIC | Age: 23
End: 2018-10-16
Payer: COMMERCIAL

## 2018-10-16 VITALS
BODY MASS INDEX: 37.86 KG/M2 | SYSTOLIC BLOOD PRESSURE: 128 MMHG | DIASTOLIC BLOOD PRESSURE: 78 MMHG | WEIGHT: 200.38 LBS

## 2018-10-16 VITALS
DIASTOLIC BLOOD PRESSURE: 70 MMHG | WEIGHT: 195.88 LBS | SYSTOLIC BLOOD PRESSURE: 118 MMHG | BODY MASS INDEX: 37.01 KG/M2

## 2018-10-16 DIAGNOSIS — O30.002 TWIN PREGNANCY, TWINS DISCORDANT IN SECOND TRIMESTER, FETUS 2 OF MULTIPLE GESTATION: ICD-10-CM

## 2018-10-16 DIAGNOSIS — O30.032 MONOCHORIONIC DIAMNIOTIC TWIN GESTATION IN SECOND TRIMESTER: ICD-10-CM

## 2018-10-16 DIAGNOSIS — O36.5922 TWIN PREGNANCY, TWINS DISCORDANT IN SECOND TRIMESTER, FETUS 2 OF MULTIPLE GESTATION: ICD-10-CM

## 2018-10-16 DIAGNOSIS — O30.002 MONOZYGOTIC TWINS IN SECOND TRIMESTER: Primary | ICD-10-CM

## 2018-10-16 DIAGNOSIS — Z3A.17 17 WEEKS GESTATION OF PREGNANCY: Primary | ICD-10-CM

## 2018-10-16 PROCEDURE — 76816 OB US FOLLOW-UP PER FETUS: CPT | Mod: S$GLB,,, | Performed by: OBSTETRICS & GYNECOLOGY

## 2018-10-16 PROCEDURE — 99213 OFFICE O/P EST LOW 20 MIN: CPT | Mod: 25,S$GLB,, | Performed by: OBSTETRICS & GYNECOLOGY

## 2018-10-16 PROCEDURE — 99999 PR PBB SHADOW E&M-EST. PATIENT-LVL III: CPT | Mod: PBBFAC,,, | Performed by: OBSTETRICS & GYNECOLOGY

## 2018-10-16 PROCEDURE — 0502F SUBSEQUENT PRENATAL CARE: CPT | Mod: S$GLB,,, | Performed by: NURSE PRACTITIONER

## 2018-10-16 PROCEDURE — 99999 PR PBB SHADOW E&M-EST. PATIENT-LVL III: CPT | Mod: PBBFAC,,, | Performed by: NURSE PRACTITIONER

## 2018-10-16 RX ORDER — ACETAMINOPHEN 325 MG/1
325 TABLET ORAL EVERY 6 HOURS PRN
COMMUNITY
End: 2019-03-26

## 2018-10-16 NOTE — PROGRESS NOTES
Indication  ========    F/U Consultation: TTTS.    History  ======    General History  Height 155 cm  Height (ft) 5 ft  Height (in) 1 in  Medical History  Past surgical history: Previous surgeries performed  Surgery: tonsillectomy  Surgery: bilateral total knee replacements  Risk Factors  Details: rectal bleeding  Details: anxiety  Details: former smoker  Details: alcohol socially    Pregnancy History  ==============    Maternal Lab Tests  Test: Cell Free DNA Testing  Result: [id number]: Negative  Wants to know gender: yes    Maternal Assessment  =================    Weight 91 kg  Weight (lb) 201 lb  Height 155 cm  Height (ft) 5 ft  Height (in) 1 in  BP syst 128 mmHg  BP diast 78 mmHg  BMI 37.91 kg/m²    Method  ======    Transabdominal ultrasound examination. View: Good view.    Pregnancy  =========    Twin pregnancy. Monochorionic-diamniotic. Number of fetuses: 2.    Dating  ======    Assigned: Dating performed on 08/10/2018, based on the LMP  Assigned GA 17 w + 0 d  Assigned JESUS: 3/26/2019    General Evaluation (1)  =================    Cardiac activity: present.  bpm.  Fetal movements: visualized.  Presentation: breech, RLQ .  Placenta: anterior.  Umbilical cord: placental insertion: normal.  Amniotic fluid: DVP < 2.    Fetal Biometry (1)  ==============    Fetal Biometry  Calculated by: Hadlock (BPD-HC-AC-FL)   bpm    Fetal Anatomy (1)  ==============    Stomach: normal  Kidneys: normal  Bladder: normal  Wants to know gender: yes    General Evaluation (2)  =================    Cardiac activity: present.  bpm.  Fetal movements: visualized.  Presentation: cephalic, LUQ .  Placenta: anterior.  Amniotic fluid: MVP 6.4 cm.    Fetal Biometry (2)  ==============    Fetal Biometry  Calculated by: Hadlock (BPD-HC-AC-FL)  MVP 6.4 cm   bpm    Fetal Anatomy (2)  ==============    Stomach: normal  Kidneys: normal  Bladder: normal  Gender: female  Wants to know  gender: yes    Consultation  ==========    Previous consultation on 10/10 with Dr. Lane. Patient returns in follow-up.  We discussed the findings from today's ultrasound.  Specifically, I reviewed with the patient the risk of developing twin-twin transfusion syndrome in a monochorionic twin gestation of 10-15%. The  discordant growth of the fetuses was again reviewed (from 10/10). The discrepancy of amniotic fluid volume was discussed. The different stages  of TTTS were discussed. While polyhydramnios is not seen, I am concerned that TTTS is evolving. We discussed the treatment options that  are available at stage II and beyond. We discussed that there is no treatment for stage I TTTS other than close observation. We discussed that  the progression is not always linear; ie she could progress from stage I to stage IV or V without manifesting one of the earlier stages. We  discussed that these findings could also represent selective IUGR. The possibility for an adverse outcome for a surviving twin was discussed.  She voiced understanding. A repeat consultation and ultrasound is scheduled for 10/22. If evidence of stage II TTTS or greater, will refer to fetal  , Dr. Federico Aburto.  Time  I overall spent approximately 15 minutes in face to face time with the patient and her family, greater than 50% of which was in counseling and  care coordination.    Impression  =========    Monochorionic-Diamniotic twin gestation  On previous ultrasound from 10/10, discordant growth noted with EFW discordance of 22%. AFV discordance noted at that time but DVPs  were normal.  Twin A:  Oligohydramnios with no 2x2 cm pocket of AFV. The bladder is visualized. Normal FHR.  Twin B:  DVP of 6 cm. The bladder is visualized. Normal FHR.    Recommendation  ==============    Close interval follow-up with [facility] scheduled for 10/22.  Will notify Dr. Federico Aburto and refer appropriate if stage II TTTS or greater is noted at the  time of follow-up ultrasound.

## 2018-10-16 NOTE — LETTER
October 16, 2018      Duyen Haney MD  [address]  [address]  [place] [address]           [facility]  [address]  [facility] [address]  Phone: [phone]          Patient: Nina Montesinos   MR Number: [id number]   YOB: 1995   Date of Visit: 10/16/2018       Dear Dr. Duyen Haney:    Thank you for referring Nina Montesinos to me for evaluation. Attached you will find relevant portions of my assessment and plan of care.    If you have questions, please do not hesitate to call me. I look forward to following Nina Montesinos along with you.    Sincerely,    Linnette Montes MD    Enclosure  CC:  No Recipients    If you would like to receive this communication electronically, please contact [email or web address] or [phone] to request more information on [employer] Link access.    For providers and/or their staff who would like to refer a patient to [facility], please contact us through our one-stop-shop provider referral line, [facility], at [phone].    If you feel you have received this communication in error or would no longer like to receive these types of communications, please e-mail [email or web address]

## 2018-10-16 NOTE — TELEPHONE ENCOUNTER
Left message letting pt know I will call her tomorrow to discuss when Medina wants her to come see us.

## 2018-10-18 ENCOUNTER — INITIAL CONSULT (OUTPATIENT)
Dept: MATERNAL FETAL MEDICINE | Facility: CLINIC | Age: 23
End: 2018-10-18
Payer: COMMERCIAL

## 2018-10-18 ENCOUNTER — PROCEDURE VISIT (OUTPATIENT)
Dept: MATERNAL FETAL MEDICINE | Facility: CLINIC | Age: 23
End: 2018-10-18
Payer: COMMERCIAL

## 2018-10-18 ENCOUNTER — TELEPHONE (OUTPATIENT)
Dept: OBSTETRICS AND GYNECOLOGY | Facility: CLINIC | Age: 23
End: 2018-10-18

## 2018-10-18 VITALS
BODY MASS INDEX: 37.82 KG/M2 | SYSTOLIC BLOOD PRESSURE: 122 MMHG | WEIGHT: 200.19 LBS | DIASTOLIC BLOOD PRESSURE: 72 MMHG

## 2018-10-18 DIAGNOSIS — O30.002 MONOZYGOTIC TWINS IN SECOND TRIMESTER: ICD-10-CM

## 2018-10-18 DIAGNOSIS — O30.032 MONOCHORIONIC DIAMNIOTIC TWIN GESTATION IN SECOND TRIMESTER: ICD-10-CM

## 2018-10-18 PROCEDURE — 3008F BODY MASS INDEX DOCD: CPT | Mod: CPTII,S$GLB,, | Performed by: OBSTETRICS & GYNECOLOGY

## 2018-10-18 PROCEDURE — 99999 PR PBB SHADOW E&M-EST. PATIENT-LVL II: CPT | Mod: PBBFAC,,, | Performed by: OBSTETRICS & GYNECOLOGY

## 2018-10-18 PROCEDURE — 76816 OB US FOLLOW-UP PER FETUS: CPT | Mod: 59,S$GLB,, | Performed by: OBSTETRICS & GYNECOLOGY

## 2018-10-18 PROCEDURE — 99499 UNLISTED E&M SERVICE: CPT | Mod: S$GLB,,, | Performed by: OBSTETRICS & GYNECOLOGY

## 2018-10-18 PROCEDURE — 76820 UMBILICAL ARTERY ECHO: CPT | Mod: S$GLB,,, | Performed by: OBSTETRICS & GYNECOLOGY

## 2018-10-18 NOTE — LETTER
October 18, 2018      Duyen Haney MD  [address]  [address]  [place] [address]           [facility]  [address] [facility] [address]  Phone: [phone]          Patient: Nina Montesinos   MR Number: [id number]   YOB: 1995   Date of Visit: 10/18/2018       Dear Dr. Duyen Haney:    Thank you for referring Nina Montesinos to me for evaluation. Attached you will find relevant portions of my assessment and plan of care.    If you have questions, please do not hesitate to call me. I look forward to following Nina Montesinos along with you.    Sincerely,    Linnette Montes MD    Enclosure  CC:  No Recipients    If you would like to receive this communication electronically, please contact [email or web address] or [phone] to request more information on [employer] Link access.    For providers and/or their staff who would like to refer a patient to [facility], please contact us through our one-stop-shop provider referral line, [facility], at [phone].    If you feel you have received this communication in error or would no longer like to receive these types of communications, please e-mail [email or web address]

## 2018-10-18 NOTE — PROGRESS NOTES
Indication  ========    Follow-Up Consultation: TTTS.    History  ======    General History  Height 155 cm  Height (ft) 5 ft  Height (in) 1 in  Other: Mono/Di twin gestation  Medical History  Past surgical history: Previous surgeries performed  Surgery: tonsillectomy  Surgery: bilateral total knee replacements  Risk Factors  Details: rectal bleeding  Details: anxiety  Details: former smoker  Details: alcohol socially    Pregnancy History  ==============    Maternal Lab Tests  Test: Cell Free DNA Testing  Result: [id number]: Negative  Wants to know gender: yes    Maternal Assessment  =================    Weight 90 kg  Weight (lb) 198 lb  Height 155 cm  Height (ft) 5 ft  Height (in) 1 in  BP syst 122 mmHg  BP diast 72 mmHg  BMI 37.49 kg/m²    Pregnancy  =========    Twin pregnancy. Monochorionic-diamniotic. Number of fetuses: 2.    Dating  ======    Assigned: Dating performed on 08/10/2018, based on the LMP  Assigned GA 17 w + 2 d  Assigned JESUS: 3/26/2019    General Evaluation (1)  =================    Cardiac activity: present.  bpm.  Fetal movements: visualized.  Presentation: Vertex; maternal RLQ; donor.  Placenta:  Placental site: anterior.  Umbilical cord: Cord vessels: 3 vessel cord.  Amniotic fluid: Amount of AF: oligohydramnios.    Fetal Biometry (1)  ==============    Fetal Biometry  Calculated by: Hadlock (BPD-HC-AC-FL)   bpm    Fetal Anatomy (1)  ==============    Stomach: visualized  Bladder: initially visualized-small; upon repeat imaging for 30 minutes, not visible  Wants to know gender: yes    General Evaluation (2)  =================    Cardiac activity: present.  bpm.  Fetal movements: visualized.  Presentation: Maternal LLQ; Vertex; recipient.  Placenta:  Placental site: anterior.  Amniotic fluid: MVP 6.2 cm, relatively increased.    Fetal Biometry (2)  ==============    Fetal Biometry  Calculated by: Hadlock (BPD-HC-AC-FL)  MVP 6.2 cm   bpm    Fetal Anatomy  (2)  ==============    Stomach: normal  Bladder: normal  Gender: female  Wants to know gender: yes    Maternal Structures  ===============    Uterus / Cervix  Cervical length 34.6 mm    Consultation  ==========    Follow-up visit-TTTS  122/72  PMH: Anxiety-on celexa  PSH: TKA x 2 from sports injuries, tonsillectomy  Allergies: Latex  Meds: Celexa, PNV, Tylenol, zofran  SH: Neg  G1  Mat 21 neg  Findings from today's ultrasound reviewed with patient and her grandmother. We reviewed that I am concerned that her pregnancy has  progressed to stage II TTTS. We reviewed that the Doppler findings were intermittent and could be a technical issue due to the early  gestational age. We discussed the importance of evaluation with a fetal specialist to confirm the findings from today's ultrasound and determine  if intervention with laser photocoagulation is warranted. We briefly discussed laser photocoagulation. We discussed some of the outcomes  following laser photocoagulation including survival of both twins of ~ 70% and of one twin of ~ 80-90%. We discussed some of the potential  obstetric complications of PTL/PPROM. Following the procedure, we discussed a need to screen for recurrent TTTS and TAPS. We discussed  the risk for neurodevelopmental and cardiac complications in twins with TTTS.  Dr. Aburto will see the patient this afternoon.  Time  I overall spent approximately 25 minutes in face to face time with the patient and her family, greater than 50% of which was in counseling and  care coordination.    Impression  =========    Monochorionic-Diamniotic Twin Gestation  Twin A (Donor) is located on the maternal right. Progressive oligohydramnios is noted. While a small fetal bladder was initially noted, over the  following 30 minutes, the bladder was not visible. Doppler interrogation of the umbilical artery significant for periods suspicious for intermittent  absent end diastolic flow.  Twin B (Recipient) is located on the  maternal left. The DVP measures 6.2 cm. The bladder was visualized. Doppler interrogation of the umbilical  artery shows the presence of diastolic flow.  Transabdominal cervical length of 3.46 cm.  The placenta is anterior.    Recommendation  ==============    Patient referred to Dr. Federico Aburto for consultation today.  Dr. Haney notified and will contact patient.  A tentative follow-up appointment has been scheduled for 10/22.

## 2018-10-19 ENCOUNTER — TELEPHONE (OUTPATIENT)
Dept: OBSTETRICS AND GYNECOLOGY | Facility: CLINIC | Age: 23
End: 2018-10-19

## 2018-10-19 NOTE — TELEPHONE ENCOUNTER
Called pt's mother to discuss insurance issue and spoke with [employer] on pt's behalf and provided information to hopefully get the procedure out of network approved.

## 2018-10-22 NOTE — PROGRESS NOTES
Here today with her mother for routine check following u/s at [facility] today.    Final MFM report today:    Impression  =========  Monochorionic-Diamniotic twin gestation  On previous ultrasound from 10/10, discordant growth noted with EFW discordance of 22%. AFV discordance noted at that time but DVPs were normal.  Twin A:  Oligohydramnios with no 2x2 cm pocket of AFV. The bladder is visualized. Normal FHR.  Twin B:  DVP of 6 cm. The bladder is visualized. Normal FHR.    Recommendation  ==============  Close interval follow-up with MFM scheduled for 10/22.  Will notify Dr. Federico Aburto and refer appropriate if stage II TTTS or greater is noted at the time of follow-up ultrasound.    Patient has no complaints today otherwise.  She and her mother both verbalized understanding of today's u/s findings, and will return Mon., 10/22 for repeat u/s with MFM to determine POC.  Bleeding/cramping prec given.

## 2018-10-22 NOTE — PROGRESS NOTES
[facility] not [facility] [place] [facility]. No OB visit with me today

## 2018-10-24 ENCOUNTER — ROUTINE PRENATAL (OUTPATIENT)
Dept: OBSTETRICS AND GYNECOLOGY | Facility: CLINIC | Age: 23
End: 2018-10-24
Attending: OBSTETRICS & GYNECOLOGY
Payer: COMMERCIAL

## 2018-10-24 VITALS
BODY MASS INDEX: 38.14 KG/M2 | WEIGHT: 201.81 LBS | SYSTOLIC BLOOD PRESSURE: 100 MMHG | DIASTOLIC BLOOD PRESSURE: 60 MMHG

## 2018-10-24 DIAGNOSIS — Z34.02 ENCOUNTER FOR SUPERVISION OF NORMAL FIRST PREGNANCY IN SECOND TRIMESTER: Primary | ICD-10-CM

## 2018-10-24 PROCEDURE — 0502F SUBSEQUENT PRENATAL CARE: CPT | Mod: S$GLB,,, | Performed by: OBSTETRICS & GYNECOLOGY

## 2018-10-24 PROCEDURE — 99999 PR PBB SHADOW E&M-EST. PATIENT-LVL III: CPT | Mod: PBBFAC,,, | Performed by: OBSTETRICS & GYNECOLOGY

## 2018-10-24 RX ORDER — IBUPROFEN 200 MG
600 TABLET ORAL EVERY 6 HOURS PRN
COMMUNITY
End: 2018-11-30

## 2018-10-24 RX ORDER — NIFEDIPINE 10 MG/1
CAPSULE ORAL
Refills: 1 | COMMUNITY
Start: 2018-10-22 | End: 2018-11-30

## 2018-10-24 NOTE — PROGRESS NOTES
S/p ablation for twin to twin in [place] with Dr. Federico Aburto. Started in procardia for irregular contractions. Doing well, no bleeding, no loss of fluid. FHT x 2 confirmed with V scan. All questions answered. Thierry with MFM tomorrow

## 2018-10-25 ENCOUNTER — INITIAL CONSULT (OUTPATIENT)
Dept: MATERNAL FETAL MEDICINE | Facility: CLINIC | Age: 23
End: 2018-10-25
Payer: COMMERCIAL

## 2018-10-25 ENCOUNTER — PROCEDURE VISIT (OUTPATIENT)
Dept: MATERNAL FETAL MEDICINE | Facility: CLINIC | Age: 23
End: 2018-10-25
Payer: COMMERCIAL

## 2018-10-25 VITALS
WEIGHT: 202.81 LBS | BODY MASS INDEX: 38.32 KG/M2 | SYSTOLIC BLOOD PRESSURE: 104 MMHG | DIASTOLIC BLOOD PRESSURE: 64 MMHG

## 2018-10-25 DIAGNOSIS — O30.002 MONOZYGOTIC TWINS IN SECOND TRIMESTER: ICD-10-CM

## 2018-10-25 DIAGNOSIS — O30.032 MONOCHORIONIC DIAMNIOTIC TWIN GESTATION IN SECOND TRIMESTER: ICD-10-CM

## 2018-10-25 PROCEDURE — 99213 OFFICE O/P EST LOW 20 MIN: CPT | Mod: 25,S$GLB,, | Performed by: OBSTETRICS & GYNECOLOGY

## 2018-10-25 PROCEDURE — 76820 UMBILICAL ARTERY ECHO: CPT | Mod: S$GLB,,, | Performed by: OBSTETRICS & GYNECOLOGY

## 2018-10-25 PROCEDURE — 3008F BODY MASS INDEX DOCD: CPT | Mod: CPTII,S$GLB,, | Performed by: OBSTETRICS & GYNECOLOGY

## 2018-10-25 PROCEDURE — 76815 OB US LIMITED FETUS(S): CPT | Mod: S$GLB,,, | Performed by: OBSTETRICS & GYNECOLOGY

## 2018-10-25 PROCEDURE — 99999 PR PBB SHADOW E&M-EST. PATIENT-LVL III: CPT | Mod: PBBFAC,,, | Performed by: OBSTETRICS & GYNECOLOGY

## 2018-10-25 NOTE — LETTER
October 26, 2018      Apple Rush, FNP-C  [address]  [place] [address]           [facility] - Maternal Fetal Med  [address] [facility] [address]  Phone: [phone]          Patient: Nina Montesinos   MR Number: [id number]   YOB: 1995   Date of Visit: 10/25/2018       Dear Apple Rush:    Thank you for referring Nina Montesinos to me for evaluation. Attached you will find relevant portions of my assessment and plan of care.    If you have questions, please do not hesitate to call me. I look forward to following Nina Montesinos along with you.    Sincerely,    Linnette Montes MD    Enclosure  CC:  No Recipients    If you would like to receive this communication electronically, please contact [email or web address] or [phone] to request more information on [employer] Link access.    For providers and/or their staff who would like to refer a patient to [facility], please contact us through our one-stop-shop provider referral line, [facility], at [phone].    If you feel you have received this communication in error or would no longer like to receive these types of communications, please e-mail [email or web address]

## 2018-10-26 NOTE — PROGRESS NOTES
Indication  ========    F/U Consultation TTTS.    History  ======    General History  Height 155 cm  Height (ft) 5 ft  Height (in) 1 in  Other: Mono/Di twin gestation  Medical History  Past surgical history: Previous surgeries performed  Surgery: tonsillectomy  Surgery: bilateral total knee replacements  Risk Factors  Details: rectal bleeding  Details: anxiety  Details: former smoker  Details: alcohol socially    Pregnancy History  ==============    Maternal Lab Tests  Test: Cell Free DNA Testing  Result: [id number]: Negative  Wants to know gender: yes    Maternal Assessment  =================    Height 155 cm  Height (ft) 5 ft  Height (in) 1 in  BP syst 104 mmHg  BP diast 64 mmHg    Method  ======    Transabdominal ultrasound examination, 2D Color Doppler, Voluson E10. View: Good view.    Pregnancy  =========    Twin pregnancy. Monochorionic-diamniotic. Number of fetuses: 2.    Dating  ======    Assigned: Dating performed on 08/10/2018, based on the LMP  Assigned GA 18 w + 2 d  Assigned JESUS: 3/26/2019    General Evaluation (1)  =================    Cardiac activity: present.  bpm.  Fetal movements: visualized.  Presentation: breech, RLQ.  Placenta:  Placental site: anterior.      Fetal Biometry (1)  ==============    Fetal Biometry  Calculated by: Hadlock (BPD-HC-AC-FL)   bpm    Fetal Anatomy (1)  ==============    Stomach: visualized  Bladder: visualized  Wants to know gender: yes    General Evaluation (2)  =================    Cardiac activity: present.  bpm.  Fetal movements: visualized.  Presentation: cephalic, LUQ.  Placenta:  Placental site: anterior.  Amniotic fluid: MVP 3.9 cm.    Fetal Biometry (2)  ==============    Fetal Biometry  Calculated by: Hadlock (BPD-HC-AC-FL)  MVP 3.9 cm   bpm    Fetal Anatomy (2)  ==============    Gender: female.    Consultation  ==========    F/u visit  Cramps decreased after starting ibuprofen and procardia. (Plans for 1 week of medication per  conversation with Dr. Aburto).  Discussed today's ultrasound findings and plans for follow-up on 10/29. Will continue close observation in setting of post laser ablation for  TTTS.  Time  I overall spent approximately 15 minutes in face to face time with the patient and her family, greater than 50% of which was in counseling and  care coordination.    Impression  =========    Monochorionic Diamniotic Twins, s/p laser photocoagulation  Twin A (Donor) on the maternal right. The bladder and stomach are visualized. The AFV has normalized. Doppler interrogation of the umbilical  arteries significant for intermittent absent end diastolic flow.  Twin B (Recipient) on the maternal left. The bladder and stomach are visualized. The AFV has normalized. Doppler interrogation of the umbilical  arteries shows the presence of diastolic flow.  Possible small area of chorioamniotic separation in the left upper quadrant.    Recommendation  ==============    Follow-up fetal ultrasound and UA Dopplers on 10/29.  Will continue close surveillance post laser photocoagulation.

## 2018-10-29 ENCOUNTER — INITIAL CONSULT (OUTPATIENT)
Dept: MATERNAL FETAL MEDICINE | Facility: CLINIC | Age: 23
End: 2018-10-29
Payer: COMMERCIAL

## 2018-10-29 ENCOUNTER — PROCEDURE VISIT (OUTPATIENT)
Dept: MATERNAL FETAL MEDICINE | Facility: CLINIC | Age: 23
End: 2018-10-29
Attending: OBSTETRICS & GYNECOLOGY
Payer: COMMERCIAL

## 2018-10-29 VITALS
SYSTOLIC BLOOD PRESSURE: 110 MMHG | BODY MASS INDEX: 37.82 KG/M2 | WEIGHT: 200.19 LBS | DIASTOLIC BLOOD PRESSURE: 78 MMHG

## 2018-10-29 DIAGNOSIS — O30.032 MONOCHORIONIC DIAMNIOTIC TWIN GESTATION IN SECOND TRIMESTER: Primary | ICD-10-CM

## 2018-10-29 DIAGNOSIS — O30.002 MONOZYGOTIC TWINS IN SECOND TRIMESTER: ICD-10-CM

## 2018-10-29 PROCEDURE — 99499 UNLISTED E&M SERVICE: CPT | Mod: S$GLB,,, | Performed by: OBSTETRICS & GYNECOLOGY

## 2018-10-29 PROCEDURE — 76815 OB US LIMITED FETUS(S): CPT | Mod: S$GLB,,, | Performed by: OBSTETRICS & GYNECOLOGY

## 2018-10-29 PROCEDURE — 99999 PR PBB SHADOW E&M-EST. PATIENT-LVL III: CPT | Mod: PBBFAC,,, | Performed by: OBSTETRICS & GYNECOLOGY

## 2018-10-29 PROCEDURE — 76820 UMBILICAL ARTERY ECHO: CPT | Mod: S$GLB,,, | Performed by: OBSTETRICS & GYNECOLOGY

## 2018-10-29 NOTE — LETTER
October 29, 2018      Duyen Haney MD  [address]  [address]  [place] [address]           [facility] - Maternal Fetal Med  [address]  [facility] [address]  Phone: [phone]          Patient: Nina Montesinos   MR Number: [id number]   YOB: 1995   Date of Visit: 10/29/2018       Dear Dr. Duyen Haney:    Thank you for referring Nina Montesinos to me for evaluation. Attached you will find relevant portions of my assessment and plan of care.    If you have questions, please do not hesitate to call me. I look forward to following Nina Montesinos along with you.    Sincerely,    Linnette Montes MD    Enclosure  CC:  No Recipients    If you would like to receive this communication electronically, please contact [email or web address] or [phone] to request more information on [employer] Link access.    For providers and/or their staff who would like to refer a patient to [facility], please contact us through our one-stop-shop provider referral line, [facility], at [phone].    If you feel you have received this communication in error or would no longer like to receive these types of communications, please e-mail [email or web address]

## 2018-10-30 NOTE — PROGRESS NOTES
Indication  ========    RT MD, TTTS, Dopplers.    History  ======    General History  Height 155 cm  Height (ft) 5 ft  Height (in) 1 in  Other: Mono/Di twin gestation  Medical History  Past surgical history: Previous surgeries performed  Surgery: tonsillectomy  Surgery: bilateral total knee replacements  Risk Factors  Details: rectal bleeding  Details: anxiety  Details: former smoker  Details: alcohol socially    Pregnancy History  ==============    Maternal Lab Tests  Test: Cell Free DNA Testing  Result: [id number]: Negative  Wants to know gender: yes    Maternal Assessment  =================    Weight 91 kg  Weight (lb) 201 lb  Height 155 cm  Height (ft) 5 ft  Height (in) 1 in  BP syst 110 mmHg  BP diast 78 mmHg  BMI 37.91 kg/m²    Method  ======    Transabdominal ultrasound examination, Voluson E10. View: Sufficient.    Pregnancy  =========    Twin pregnancy. Monochorionic-diamniotic. Number of fetuses: 2.    Dating  ======    Assigned: Dating performed on 08/10/2018, based on the LMP  Assigned GA 18 w + 6 d  Assigned JESUS: 3/26/2019    General Evaluation (1)  =================    Cardiac activity: present.  bpm.  Fetal movements: visualized.  Presentation: cephalic right.  Placenta: anterior.  Umbilical cord: 3 vessel cord.  Amniotic fluid: normal amount.    Fetal Biometry (1)  ==============    Fetal Biometry  Calculated by: Hadlock (BPD-HC-AC-FL)   bpm    Fetal Anatomy (1)  ==============    Stomach: normal  Bladder: normal  Wants to know gender: yes    Fetal Doppler (1)  =============    Umbilical Cord  Umbilical A RI 0.64  Umbilical A PI 1.00  Umbilical A S / D 2.81  Umbilical A  bpm  Head / Brain  Rt MCA PI divided by: Umbilical artery PI  Lt MCA PI divided by: Umbilical artery PI    General Evaluation (2)  =================    Cardiac activity: present.  bpm.  Fetal movements: visualized.  Presentation: transverse top.  Placenta: anterior.  Umbilical cord: 3 vessel cord.  Amniotic  fluid: normal amount.    Fetal Biometry (2)  ==============    Fetal Biometry  Calculated by: Hadlock (BPD-HC-AC-FL)   bpm    Fetal Anatomy (2)  ==============    Stomach: normal  Bladder: normal  Gender: female  Wants to know gender: yes    Fetal Doppler (2)  =============    Umbilical Cord  Umbilical A RI 0.66  Umbilical A PI 1.22  Umbilical A S / D 2.93  Umbilical A  bpm  Head / Brain  Rt MCA PI divided by: Umbilical artery PI  Lt MCA PI divided by: Umbilical artery PI    Impression  =========    F/u Monochorionic-Diamniotic twin gestation  Twin A: Bladder, stomach visualized. Normal MVP. Doppler interrogation of the umbilical artery shows the presence of diastolic flow.  Twin B: Bladder, stomach visualized. Normal MVP. Doppler interrogation of the umbilical artery shows the presence of diastolic flow.  Findings from today's ultrasound and plans for follow-up discussed.    Recommendation  ==============    Follow-up ultrasound on Friday for anatomy surveys, fetal growth assessments, MCA Dopplers, and UA Dopplers.  Recommend fetal echocardiogram at 22-24 weeks.

## 2018-11-02 ENCOUNTER — INITIAL CONSULT (OUTPATIENT)
Dept: MATERNAL FETAL MEDICINE | Facility: CLINIC | Age: 23
End: 2018-11-02
Attending: OBSTETRICS & GYNECOLOGY
Payer: COMMERCIAL

## 2018-11-02 DIAGNOSIS — O30.032 MONOCHORIONIC DIAMNIOTIC TWIN GESTATION IN SECOND TRIMESTER: Primary | ICD-10-CM

## 2018-11-02 DIAGNOSIS — O30.002 MONOZYGOTIC TWINS IN SECOND TRIMESTER: ICD-10-CM

## 2018-11-02 DIAGNOSIS — Z36.3 ANTENATAL SCREENING FOR MALFORMATION USING ULTRASONICS: ICD-10-CM

## 2018-11-02 DIAGNOSIS — O30.032 MONOCHORIONIC DIAMNIOTIC TWIN GESTATION IN SECOND TRIMESTER: ICD-10-CM

## 2018-11-02 PROCEDURE — 76816 OB US FOLLOW-UP PER FETUS: CPT | Mod: S$GLB,,, | Performed by: OBSTETRICS & GYNECOLOGY

## 2018-11-02 PROCEDURE — 99499 UNLISTED E&M SERVICE: CPT | Mod: S$GLB,,, | Performed by: OBSTETRICS & GYNECOLOGY

## 2018-11-02 NOTE — LETTER
November 5, 2018      Duyen Haney MD  [address]  [address]  [place] [address]           [facility] - Maternal Fetal Med  [address]  [facility] [address]  Phone: [phone]          Patient: Nina Montesinos   MR Number: [id number]   YOB: 1995   Date of Visit: 11/2/2018       Dear Dr. Duyen Haney:    Thank you for referring Nina Montesinos to me for evaluation. Attached you will find relevant portions of my assessment and plan of care.    If you have questions, please do not hesitate to call me. I look forward to following Nina Montesinos along with you.    Sincerely,    Jessi Lane MD    Enclosure  CC:  No Recipients    If you would like to receive this communication electronically, please contact [email or web address] or [phone] to request more information on [employer] Link access.    For providers and/or their staff who would like to refer a patient to [facility], please contact us through our one-stop-shop provider referral line, [facility], at [phone].    If you feel you have received this communication in error or would no longer like to receive these types of communications, please e-mail [email or web address]

## 2018-11-07 ENCOUNTER — INITIAL CONSULT (OUTPATIENT)
Dept: MATERNAL FETAL MEDICINE | Facility: CLINIC | Age: 23
End: 2018-11-07
Attending: OBSTETRICS & GYNECOLOGY
Payer: COMMERCIAL

## 2018-11-07 VITALS
DIASTOLIC BLOOD PRESSURE: 74 MMHG | WEIGHT: 200.19 LBS | SYSTOLIC BLOOD PRESSURE: 100 MMHG | BODY MASS INDEX: 37.82 KG/M2

## 2018-11-07 DIAGNOSIS — O43.022: ICD-10-CM

## 2018-11-07 DIAGNOSIS — O30.002 MONOZYGOTIC TWINS IN SECOND TRIMESTER: ICD-10-CM

## 2018-11-07 DIAGNOSIS — O30.032 MONOCHORIONIC DIAMNIOTIC TWIN GESTATION IN SECOND TRIMESTER: ICD-10-CM

## 2018-11-07 PROCEDURE — 99499 UNLISTED E&M SERVICE: CPT | Mod: S$GLB,,, | Performed by: OBSTETRICS & GYNECOLOGY

## 2018-11-07 PROCEDURE — 76815 OB US LIMITED FETUS(S): CPT | Mod: S$GLB,,, | Performed by: OBSTETRICS & GYNECOLOGY

## 2018-11-07 PROCEDURE — 99999 PR PBB SHADOW E&M-EST. PATIENT-LVL III: CPT | Mod: PBBFAC,,, | Performed by: OBSTETRICS & GYNECOLOGY

## 2018-11-07 NOTE — LETTER
November 9, 2018      Duyen Haney MD  [address]  [address]  [place] [address]           [facility]  [address]  [facility] [address]  Phone: [phone]          Patient: Nina Montesinos   MR Number: [id number]   YOB: 1995   Date of Visit: 11/7/2018       Dear Dr. Duyen Haney:    Thank you for referring Nina Montesinos to me for evaluation. Attached you will find relevant portions of my assessment and plan of care.    If you have questions, please do not hesitate to call me. I look forward to following Nina Montesinos along with you.    Sincerely,    Jessi Lane MD    Enclosure  CC:  No Recipients    If you would like to receive this communication electronically, please contact [email or web address] or [phone] to request more information on [employer] Link access.    For providers and/or their staff who would like to refer a patient to [facility], please contact us through our one-stop-shop provider referral line, [facility], at [phone].    If you feel you have received this communication in error or would no longer like to receive these types of communications, please e-mail [email or web address]

## 2018-11-16 ENCOUNTER — HOSPITAL ENCOUNTER (EMERGENCY)
Facility: OTHER | Age: 23
Discharge: HOME | End: 2018-11-16
Attending: OBSTETRICS & GYNECOLOGY
Payer: COMMERCIAL

## 2018-11-16 ENCOUNTER — PROCEDURE VISIT (OUTPATIENT)
Dept: MATERNAL FETAL MEDICINE | Facility: CLINIC | Age: 23
End: 2018-11-16
Payer: COMMERCIAL

## 2018-11-16 ENCOUNTER — INITIAL CONSULT (OUTPATIENT)
Dept: MATERNAL FETAL MEDICINE | Facility: CLINIC | Age: 23
End: 2018-11-16
Payer: COMMERCIAL

## 2018-11-16 VITALS
OXYGEN SATURATION: 98 % | HEART RATE: 88 BPM | TEMPERATURE: 98 F | DIASTOLIC BLOOD PRESSURE: 63 MMHG | SYSTOLIC BLOOD PRESSURE: 112 MMHG | RESPIRATION RATE: 17 BRPM

## 2018-11-16 DIAGNOSIS — O30.002 MONOZYGOTIC TWINS IN SECOND TRIMESTER: ICD-10-CM

## 2018-11-16 DIAGNOSIS — O30.032 MONOCHORIONIC DIAMNIOTIC TWIN GESTATION IN SECOND TRIMESTER: ICD-10-CM

## 2018-11-16 DIAGNOSIS — O43.029 MONOCHORIONIC DIAMNIOTIC TWIN PREGNANCY WITH TWIN TO TWIN TRANSFUSION SYNDROME, ANTEPARTUM: ICD-10-CM

## 2018-11-16 DIAGNOSIS — O26.892 VAGINAL DISCHARGE DURING PREGNANCY IN SECOND TRIMESTER: Primary | ICD-10-CM

## 2018-11-16 DIAGNOSIS — O30.039 MONOCHORIONIC DIAMNIOTIC TWIN PREGNANCY WITH TWIN TO TWIN TRANSFUSION SYNDROME, ANTEPARTUM: ICD-10-CM

## 2018-11-16 DIAGNOSIS — N89.8 VAGINAL DISCHARGE DURING PREGNANCY IN SECOND TRIMESTER: Primary | ICD-10-CM

## 2018-11-16 DIAGNOSIS — Z3A.21 21 WEEKS GESTATION OF PREGNANCY: ICD-10-CM

## 2018-11-16 PROCEDURE — 76821 MIDDLE CEREBRAL ARTERY ECHO: CPT | Mod: 59,S$GLB,, | Performed by: OBSTETRICS & GYNECOLOGY

## 2018-11-16 PROCEDURE — 99213 OFFICE O/P EST LOW 20 MIN: CPT | Mod: 25,S$GLB,, | Performed by: OBSTETRICS & GYNECOLOGY

## 2018-11-16 PROCEDURE — 76820 UMBILICAL ARTERY ECHO: CPT | Mod: S$GLB,,, | Performed by: OBSTETRICS & GYNECOLOGY

## 2018-11-16 PROCEDURE — 99284 EMERGENCY DEPT VISIT MOD MDM: CPT | Mod: ,,, | Performed by: OBSTETRICS & GYNECOLOGY

## 2018-11-16 PROCEDURE — 76815 OB US LIMITED FETUS(S): CPT | Mod: 59,S$GLB,, | Performed by: OBSTETRICS & GYNECOLOGY

## 2018-11-16 PROCEDURE — 99283 EMERGENCY DEPT VISIT LOW MDM: CPT

## 2018-11-16 NOTE — ED PROVIDER NOTES
Encounter Date: 11/16/2018       History     Chief Complaint   Patient presents with    Rupture of Membranes     23 y.o. G1 @ 21w3d coming from [facility] clinic to rule out ROM. She had US today with normal fluid around babies but she did complain that she felt fluid come out on Saturday (almost 1 week ago) but it never happened again so she didn't think anything of it. The MFM in clinic felt it best for her to come and make sure she didn't have evidence of ROM on exam.  She denies any other complaints. No CTX. No VB. Feeling FM x2.           Review of patient's allergies indicates:   Allergen Reactions    Latex, natural rubber Hives     Past Medical History:   Diagnosis Date    Anxiety     Rectal bleeding     rectal bleeding when trying to defacate - pt having surgery on May 8th, 2018     Past Surgical History:   Procedure Laterality Date    MD TOTAL KNEE ARTHROPLASTY Bilateral     Knee Replacement, Total    TONSILLECTOMY N/A      Family History   Problem Relation Age of Onset    Breast cancer Maternal Grandmother     Cancer Maternal Grandmother     Stroke Maternal Grandfather     Hypertension Mother     Colon cancer Neg Hx     Diabetes Neg Hx     Ovarian cancer Neg Hx      Social History     Tobacco Use    Smoking status: Former Smoker    Smokeless tobacco: Never Used   Substance Use Topics    Alcohol use: Yes     Comment: social     Drug use: No     Review of Systems   Constitutional: Negative for fever.   Eyes: Negative for visual disturbance.   Respiratory: Negative for shortness of breath.    Cardiovascular: Negative for chest pain.   Gastrointestinal: Negative for abdominal pain.   Genitourinary: Positive for vaginal discharge. Negative for vaginal bleeding.   Musculoskeletal: Negative for back pain.   Skin: Negative for rash.   Neurological: Negative for light-headedness.   Hematological: Does not bruise/bleed easily.   Psychiatric/Behavioral: The patient is not nervous/anxious.        Physical  Exam     Initial Vitals   BP Pulse Resp Temp SpO2   11/16/18 1254 11/16/18 1254 11/16/18 1255 11/16/18 1255 11/16/18 1256   112/63 94 17 98.1 °F (36.7 °C) 98 %      MAP       --                Physical Exam    Vitals reviewed.  Constitutional: She appears well-developed and well-nourished. She is not diaphoretic. No distress.   Cardiovascular: Normal rate, regular rhythm, normal heart sounds and intact distal pulses.   Pulmonary/Chest: Breath sounds normal.   Abdominal: Soft. There is no tenderness. There is no guarding.   Neurological: She is alert and oriented to person, place, and time. She has normal strength. No sensory deficit.   Psychiatric: She has a normal mood and affect. Her behavior is normal. Judgment and thought content normal.     OB LABOR EXAM:       Method: Sterile vaginal exam per MD.             Amniotic Fluid Color: no fluid.     Comments: NEG pooling, NEG nitrazine, NEG ferning  Physiologic white discharge in vaginal vault       ED Course   Procedures  Labs Reviewed - No data to display       Imaging Results    None          Medical Decision Making:   ED Management:  SSE: cervix visually closed; +physiologic discharge, NEG pooling, NEG nitrazine, NEG ferning  Patient had MFM US today with 2 viable fetuses and normal amniotic fluid  TOCO: no contractions    Patient discharged home with return precautions  To follow up with regularly scheduled OB                      Clinical Impression:   The primary encounter diagnosis was Vaginal discharge during pregnancy in second trimester. Diagnoses of 21 weeks gestation of pregnancy and Monochorionic diamniotic twin gestation in second trimester were also pertinent to this visit.                             Mary Gomez MD  11/16/18 9080

## 2018-11-16 NOTE — DISCHARGE INSTRUCTIONS
Call clinic [phone] or L & D after hours at [phone] for vaginal bleeding, leakage of fluids, contractions 4-5 in one hour, decreased fetal movements ( 10 kicks in 2 hours), headache not relieved by Tylenol, blurry vision, or temp of 100.4 or greater.  Begin doing fetal kick counts, at least 10 movements in 2 hours starting at 28 weeks gestation.  Keep next clinic appointment

## 2018-11-16 NOTE — PROGRESS NOTES
Indication  ========    F/U Consultation: 2 week f/u TTTS/ MCA and UA dopplers .    History  ======    General History  Height 155 cm  Height (ft) 5 ft  Height (in) 1 in  Other: Mono/Di twin gestation  s/p laser ablation for TTTS  Medical History  Past surgical history: Previous surgeries performed  Surgery: tonsillectomy  Surgery: bilateral total knee replacements  Risk Factors  Details: rectal bleeding  Details: anxiety  Details: former smoker  Details: alcohol socially    Pregnancy History  ==============    Maternal Lab Tests  Test: Cell Free DNA Testing  Result: [id number]: Negative  Wants to know gender: yes    Maternal Assessment  =================    Weight 93 kg  Weight (lb) 205 lb  Height 155 cm  Height (ft) 5 ft  Height (in) 1 in  BP syst 104 mmHg  BP diast 74 mmHg  BMI 38.74 kg/m²    Method  ======    Transabdominal ultrasound examination, Voluson E10. View: Good view.    Pregnancy  =========    Twin pregnancy. Monochorionic-diamniotic. Number of fetuses: 2.    Dating  ======    Cycle: regular cycle  Assigned: Dating performed on 08/10/2018, based on the LMP  Assigned GA 21 w + 3 d  Assigned JESUS: 3/26/2019    General Evaluation (1)  =================    Cardiac activity: present.  bpm.  Fetal movements: visualized.  Presentation: cephalic, RLQ .  Placenta: anterior.  Amniotic fluid: MVP 2.7 cm.    Fetal Biometry (1)  ==============    Fetal Biometry  Calculated by: Hadlock (BPD-HC-AC-FL)  MVP 2.7 cm   bpm  Head / Face / Neck   6.6 mm    Fetal Anatomy (1)  ==============    Cranium: normal  Lateral ventricles: normal  Choroid plexus: normal  Stomach: normal  Kidneys: normal  Bladder: normal  Rt upper leg: normal  Rt lower leg: normal  Wants to know gender: yes    Fetal Doppler (1)  =============    Umbilical Cord  Umbilical A PS 41.30 cm/s  Umbilical A ED 11.75 cm/s  Umbilical A S / D 3.79 47% Jonathan  Head / Brain  Lt MCA PS 23.40 cm/s  MoM 0.86  Lt MCA  bpm    General Evaluation  (2)  =================    Cardiac activity: present.  bpm.  Fetal movements: visualized.  Presentation: transverse top, head to right .  Placenta: anterior.  Amniotic fluid: MVP 2.5 cm.    Fetal Biometry (2)  ==============    Fetal Biometry  Calculated by: Hadlock (BPD-HC-AC-FL)  MVP 2.5 cm   bpm    Fetal Anatomy (2)  ==============    Cranium: normal  Diaphragm: normal  Stomach: normal  Kidneys: normal  Bladder: normal  Wants to know gender: yes    Fetal Doppler (2)  =============    Umbilical Cord  Umbilical A PS 68.48 cm/s  Umbilical A ED 25.68 cm/s  Umbilical A S / D 2.56 4% Jonathan  Head / Brain  Rt MCA PS 37.13 cm/s  MoM 1.36  Rt MCA  bpm    Consultation  ==========    104/74  93.3 kg  Procardia 10 mg prn  Last week at night had leakage of fluid; had to change clothes and wear liners for next 2-3 days. Has stopped.  AFV is subjectively decreased (but normal by MVP), therefore recommend evaluation for rupture of membranes. Patient sent to OB ED for  examination.  F/u 1 week. Reviewed plans for follow-up ultrasounds and echocardiograms.  Time  I overall spent approximately 15 minutes in face to face time with the patient and her family, greater than 50% of which was in counseling and  care coordination.    Impression  =========    Monochorionic-Diamniotic twin gestation s/p laser ablation for TTTS  Twin A: (Donor)  Maternal right lower quadrant.  Vertex presentation.  Bladder, stomach visualized.  AFV normal by MVP.  MCA Doppler PSV 0.86 MoM.  UAD S/D ratio normal.  Twin B: (Recipient)  Maternal left upper quadrant.  Transverse presentation.  Bladder, stomach visualized.  MCA Doppler PSV 1.36 MoM.  UAD S/D ratio normal.  No evidence of TTTS or TAPS.    Recommendation  ==============    OB ED for rule out rupture given history and subjectively less AFV on examination today.  F/u in 1 week for fetal growth, anatomy, MCA and UA Dopplers.  Fetal echocardiogram on 11/30.  Full recommendations in  notes from Dr. Lane on 11/2 and 11/7.

## 2018-11-16 NOTE — LETTER
November 16, 2018      Duyen Haney MD  [address]  [address]  [place] [address]           [facility]  [address]  [facility] [address]  Phone: [phone]          Patient: Nina Montesinos   MR Number: [id number]   YOB: 1995   Date of Visit: 11/16/2018       Dear Dr. Duyen Haney:    Thank you for referring Nina Montesinos to me for evaluation. Attached you will find relevant portions of my assessment and plan of care.    If you have questions, please do not hesitate to call me. I look forward to following Nina Montesinos along with you.    Sincerely,    Linnette Montes MD    Enclosure  CC:  No Recipients    If you would like to receive this communication electronically, please contact [email or web address] or [phone] to request more information on [employer] Link access.    For providers and/or their staff who would like to refer a patient to [facility], please contact us through our one-stop-shop provider referral line, [facility], at [phone].    If you feel you have received this communication in error or would no longer like to receive these types of communications, please e-mail [email or web address]

## 2018-11-16 NOTE — ED NOTES
Patient discharged home self care. Patient accompanied by family member. Discharge instructions given to pt at bedside. Pt verbalized understanding of instructions. Vital signs stable. Patient remained afebrile. NST reactive. No apparent distress noted.

## 2018-11-16 NOTE — ED NOTES
Patient presents to OB ED with complaints of rupture of membranes which started on November 10, 2018. Pt reports positive fetal movements, denies leakage of fluid and denies vaginal bleeding. Pt placed in OB ED room.

## 2018-11-18 ENCOUNTER — PATIENT MESSAGE (OUTPATIENT)
Dept: OBSTETRICS AND GYNECOLOGY | Facility: CLINIC | Age: 23
End: 2018-11-18

## 2018-11-21 ENCOUNTER — ROUTINE PRENATAL (OUTPATIENT)
Dept: OBSTETRICS AND GYNECOLOGY | Facility: CLINIC | Age: 23
End: 2018-11-21
Attending: OBSTETRICS & GYNECOLOGY
Payer: COMMERCIAL

## 2018-11-21 ENCOUNTER — PROCEDURE VISIT (OUTPATIENT)
Dept: MATERNAL FETAL MEDICINE | Facility: CLINIC | Age: 23
End: 2018-11-21
Payer: COMMERCIAL

## 2018-11-21 ENCOUNTER — TELEPHONE (OUTPATIENT)
Dept: OBSTETRICS AND GYNECOLOGY | Facility: CLINIC | Age: 23
End: 2018-11-21

## 2018-11-21 ENCOUNTER — INITIAL CONSULT (OUTPATIENT)
Dept: MATERNAL FETAL MEDICINE | Facility: CLINIC | Age: 23
End: 2018-11-21
Payer: COMMERCIAL

## 2018-11-21 VITALS
BODY MASS INDEX: 39.45 KG/M2 | SYSTOLIC BLOOD PRESSURE: 104 MMHG | WEIGHT: 208.75 LBS | DIASTOLIC BLOOD PRESSURE: 78 MMHG

## 2018-11-21 DIAGNOSIS — O30.039 MONOCHORIONIC DIAMNIOTIC TWIN PREGNANCY WITH TWIN TO TWIN TRANSFUSION SYNDROME, ANTEPARTUM: ICD-10-CM

## 2018-11-21 DIAGNOSIS — O43.029 MONOCHORIONIC DIAMNIOTIC TWIN PREGNANCY WITH TWIN TO TWIN TRANSFUSION SYNDROME, ANTEPARTUM: ICD-10-CM

## 2018-11-21 DIAGNOSIS — O30.032 MONOCHORIONIC DIAMNIOTIC TWIN GESTATION IN SECOND TRIMESTER: ICD-10-CM

## 2018-11-21 DIAGNOSIS — O30.002 MONOZYGOTIC TWINS IN SECOND TRIMESTER: ICD-10-CM

## 2018-11-21 DIAGNOSIS — Z34.02 ENCOUNTER FOR SUPERVISION OF NORMAL FIRST PREGNANCY IN SECOND TRIMESTER: Primary | ICD-10-CM

## 2018-11-21 PROCEDURE — 99999 PR PBB SHADOW E&M-EST. PATIENT-LVL II: CPT | Mod: PBBFAC,,, | Performed by: OBSTETRICS & GYNECOLOGY

## 2018-11-21 PROCEDURE — 0502F SUBSEQUENT PRENATAL CARE: CPT | Mod: S$GLB,,, | Performed by: OBSTETRICS & GYNECOLOGY

## 2018-11-21 PROCEDURE — 76816 OB US FOLLOW-UP PER FETUS: CPT | Mod: S$GLB,,, | Performed by: OBSTETRICS & GYNECOLOGY

## 2018-11-21 PROCEDURE — 76820 UMBILICAL ARTERY ECHO: CPT | Mod: S$GLB,,, | Performed by: OBSTETRICS & GYNECOLOGY

## 2018-11-21 PROCEDURE — 99212 OFFICE O/P EST SF 10 MIN: CPT | Mod: 25,S$GLB,, | Performed by: OBSTETRICS & GYNECOLOGY

## 2018-11-21 PROCEDURE — 99999 PR PBB SHADOW E&M-EST. PATIENT-LVL III: CPT | Mod: PBBFAC,,, | Performed by: OBSTETRICS & GYNECOLOGY

## 2018-11-21 PROCEDURE — 76821 MIDDLE CEREBRAL ARTERY ECHO: CPT | Mod: S$GLB,,, | Performed by: OBSTETRICS & GYNECOLOGY

## 2018-11-21 RX ORDER — CITALOPRAM 40 MG/1
40 TABLET, FILM COATED ORAL DAILY
Qty: 30 TABLET | Refills: 11 | Status: ON HOLD | OUTPATIENT
Start: 2018-11-21 | End: 2018-12-11 | Stop reason: HOSPADM

## 2018-11-21 NOTE — LETTER
November 21, 2018      Duyen Haney MD  [address]  [address]  [place] [address]           [facility]  [address]  [place] [address]  Phone: [phone]          Patient: Nina Montesinos   MR Number: [id number]   YOB: 1995   Date of Visit: 11/21/2018       Dear Dr. Duyen Haney:    Thank you for referring Nina Montesinos to me for evaluation. Attached you will find relevant portions of my assessment and plan of care.    If you have questions, please do not hesitate to call me. I look forward to following Nina Montesinos along with you.    Sincerely,    Linnette Montes MD    Enclosure  CC:  No Recipients    If you would like to receive this communication electronically, please contact [email or web address] or [phone] to request more information on [employer] Link access.    For providers and/or their staff who would like to refer a patient to [facility], please contact us through our one-stop-shop provider referral line, [facility], at [phone].    If you feel you have received this communication in error or would no longer like to receive these types of communications, please e-mail [email or web address]

## 2018-11-21 NOTE — TELEPHONE ENCOUNTER
Spoke with pt's mom, Tila.  Pt had an appt with Dr. Montes this AM and thinks it overwhelmed the pt.  She has been an emotion wreck and hasn't been taking anxiety medication.  Someone spoke with her about insurance, that she owes $4500.  Pt is single, having twins with problems, and it seems like its one thing after another that is coming up.  She is depressed, not eating much.  Mother is worried about it.       Mother is asking if you can talk to her at her appt today

## 2018-11-21 NOTE — PROGRESS NOTES
Increased stress. Some depression. On Celexa 20 mg. rec increase. No Si, no HI. All questions answered. Talk x 20 min. I called mom after visit and talked to her as well.

## 2018-11-29 DIAGNOSIS — O30.032 MONOCHORIONIC DIAMNIOTIC TWIN GESTATION IN SECOND TRIMESTER: Primary | ICD-10-CM

## 2018-11-30 ENCOUNTER — INITIAL CONSULT (OUTPATIENT)
Dept: MATERNAL FETAL MEDICINE | Facility: CLINIC | Age: 23
End: 2018-11-30
Payer: COMMERCIAL

## 2018-11-30 ENCOUNTER — CLINICAL SUPPORT (OUTPATIENT)
Dept: PEDIATRIC CARDIOLOGY | Facility: CLINIC | Age: 23
End: 2018-11-30
Attending: PEDIATRICS
Payer: COMMERCIAL

## 2018-11-30 ENCOUNTER — CLINICAL SUPPORT (OUTPATIENT)
Dept: PEDIATRIC CARDIOLOGY | Facility: CLINIC | Age: 23
End: 2018-11-30
Attending: OBSTETRICS & GYNECOLOGY
Payer: COMMERCIAL

## 2018-11-30 ENCOUNTER — PROCEDURE VISIT (OUTPATIENT)
Dept: MATERNAL FETAL MEDICINE | Facility: CLINIC | Age: 23
End: 2018-11-30
Payer: COMMERCIAL

## 2018-11-30 VITALS — SYSTOLIC BLOOD PRESSURE: 118 MMHG | WEIGHT: 209 LBS | DIASTOLIC BLOOD PRESSURE: 80 MMHG | BODY MASS INDEX: 39.49 KG/M2

## 2018-11-30 VITALS
SYSTOLIC BLOOD PRESSURE: 118 MMHG | BODY MASS INDEX: 39.46 KG/M2 | HEIGHT: 61 IN | DIASTOLIC BLOOD PRESSURE: 80 MMHG | WEIGHT: 209 LBS

## 2018-11-30 DIAGNOSIS — O30.002 MONOZYGOTIC TWINS IN SECOND TRIMESTER: ICD-10-CM

## 2018-11-30 DIAGNOSIS — O30.032 MONOCHORIONIC DIAMNIOTIC TWIN GESTATION IN SECOND TRIMESTER: ICD-10-CM

## 2018-11-30 DIAGNOSIS — O30.039 MONOCHORIONIC DIAMNIOTIC TWIN PREGNANCY WITH TWIN TO TWIN TRANSFUSION SYNDROME, ANTEPARTUM: ICD-10-CM

## 2018-11-30 DIAGNOSIS — O43.022 TWIN TO TWIN TRANSFUSION IN SECOND TRIMESTER: Primary | ICD-10-CM

## 2018-11-30 DIAGNOSIS — O43.029 MONOCHORIONIC DIAMNIOTIC TWIN PREGNANCY WITH TWIN TO TWIN TRANSFUSION SYNDROME, ANTEPARTUM: ICD-10-CM

## 2018-11-30 PROCEDURE — 76820 UMBILICAL ARTERY ECHO: CPT | Mod: S$GLB,,, | Performed by: OBSTETRICS & GYNECOLOGY

## 2018-11-30 PROCEDURE — 3008F BODY MASS INDEX DOCD: CPT | Mod: CPTII,S$GLB,, | Performed by: OBSTETRICS & GYNECOLOGY

## 2018-11-30 PROCEDURE — 99242 OFF/OP CONSLTJ NEW/EST SF 20: CPT | Mod: 25,S$GLB,, | Performed by: PEDIATRICS

## 2018-11-30 PROCEDURE — 76821 MIDDLE CEREBRAL ARTERY ECHO: CPT | Mod: S$GLB,,, | Performed by: OBSTETRICS & GYNECOLOGY

## 2018-11-30 PROCEDURE — 99999 PR PBB SHADOW E&M-EST. PATIENT-LVL III: CPT | Mod: PBBFAC,,, | Performed by: OBSTETRICS & GYNECOLOGY

## 2018-11-30 PROCEDURE — 99999 PR PBB SHADOW E&M-EST. PATIENT-LVL I: CPT | Mod: PBBFAC,,,

## 2018-11-30 PROCEDURE — 76816 OB US FOLLOW-UP PER FETUS: CPT | Mod: 59,S$GLB,, | Performed by: OBSTETRICS & GYNECOLOGY

## 2018-11-30 PROCEDURE — 99213 OFFICE O/P EST LOW 20 MIN: CPT | Mod: 25,S$GLB,, | Performed by: OBSTETRICS & GYNECOLOGY

## 2018-11-30 PROCEDURE — 76827 ECHO EXAM OF FETAL HEART: CPT | Mod: S$GLB,,, | Performed by: PEDIATRICS

## 2018-11-30 PROCEDURE — 99999 PR PBB SHADOW E&M-EST. PATIENT-LVL III: CPT | Mod: PBBFAC,,, | Performed by: PEDIATRICS

## 2018-11-30 PROCEDURE — 93325 DOPPLER ECHO COLOR FLOW MAPG: CPT | Mod: S$GLB,,, | Performed by: PEDIATRICS

## 2018-11-30 PROCEDURE — 76825 ECHO EXAM OF FETAL HEART: CPT | Mod: S$GLB,,, | Performed by: PEDIATRICS

## 2018-11-30 NOTE — LETTER
November 30, 2018      Duyen Haney MD  [address]  [address]  [place] [address]           [facility]  [address]  [facility] [address]  Phone: [phone]          Patient: Nina Montesinos   MR Number: [id number]   YOB: 1995   Date of Visit: 11/30/2018       Dear Dr. Duyen Haney:    Thank you for referring Nina Montesinos to me for evaluation. Attached you will find relevant portions of my assessment and plan of care.    If you have questions, please do not hesitate to call me. I look forward to following Nina Montesinos along with you.    Sincerely,    Kaushal Alvarez MD    Enclosure  CC:  No Recipients    If you would like to receive this communication electronically, please contact [email or web address] or [phone] to request more information on [employer] Link access.    For providers and/or their staff who would like to refer a patient to [facility], please contact us through our one-stop-shop provider referral line, [facility] , at [phone].    If you feel you have received this communication in error or would no longer like to receive these types of communications, please e-mail [email or web address]

## 2018-12-02 ENCOUNTER — HOSPITAL ENCOUNTER (EMERGENCY)
Facility: OTHER | Age: 23
Discharge: HOME | End: 2018-12-02
Attending: OBSTETRICS & GYNECOLOGY
Payer: COMMERCIAL

## 2018-12-02 VITALS
SYSTOLIC BLOOD PRESSURE: 122 MMHG | DIASTOLIC BLOOD PRESSURE: 55 MMHG | HEART RATE: 87 BPM | TEMPERATURE: 98 F | RESPIRATION RATE: 18 BRPM | OXYGEN SATURATION: 98 %

## 2018-12-02 DIAGNOSIS — O30.019 MONOAMNIOTIC MONOCHORIONIC TWIN PREGNANCY, ANTEPARTUM: ICD-10-CM

## 2018-12-02 DIAGNOSIS — N89.8 VAGINAL DISCHARGE DURING PREGNANCY IN SECOND TRIMESTER: ICD-10-CM

## 2018-12-02 DIAGNOSIS — B37.31 VAGINAL YEAST INFECTION: Primary | ICD-10-CM

## 2018-12-02 DIAGNOSIS — O26.892 VAGINAL DISCHARGE DURING PREGNANCY IN SECOND TRIMESTER: ICD-10-CM

## 2018-12-02 DIAGNOSIS — Z3A.23 23 WEEKS GESTATION OF PREGNANCY: ICD-10-CM

## 2018-12-02 LAB
ABDOMINAL CIRCUMFERENCE: NORMAL CM
BIPARIETAL DIAMETER: NORMAL CM
ESTIMATED FETAL WEIGHT: NORMAL GRAMS
FEMUR LENGTH: NORMAL
HC/AC: NORMAL
HEAD CIRCUMFERENCE: NORMAL CM

## 2018-12-02 PROCEDURE — 76815 OB US LIMITED FETUS(S): CPT | Mod: 26,,, | Performed by: OBSTETRICS & GYNECOLOGY

## 2018-12-02 PROCEDURE — 99284 EMERGENCY DEPT VISIT MOD MDM: CPT

## 2018-12-02 PROCEDURE — 99284 EMERGENCY DEPT VISIT MOD MDM: CPT | Mod: 25,,, | Performed by: OBSTETRICS & GYNECOLOGY

## 2018-12-02 NOTE — ED PROVIDER NOTES
Encounter Date: 12/2/2018       History     Chief Complaint   Patient presents with    Vaginal Discharge     23 y.o. G1 @ 23w5d p/w c/o vaginal discharge, yellowish in appearance that she noticed today. Denies gush of fluid. Denies VB. Denies CTX/abdominal pain.   Her pregnancy is c/b mo-mo TIUP with TTTS s/p laser ablation and oligohydraminos of twin A (noted on US 11/30).          Review of patient's allergies indicates:   Allergen Reactions    Latex, natural rubber Hives     Past Medical History:   Diagnosis Date    Anxiety     Rectal bleeding     rectal bleeding when trying to defacate - pt having surgery on May 8th, 2018     Past Surgical History:   Procedure Laterality Date    ENDOSCOPIC LASER ABLATION   11/19/2018    Dr. Aburto, TTTS    MA TOTAL KNEE ARTHROPLASTY Bilateral     4 total.  3 right, 1 left    TONSILLECTOMY N/A      Family History   Problem Relation Age of Onset    Breast cancer Maternal Grandmother     Cancer Maternal Grandmother     Stroke Maternal Grandfather     Hypertension Mother     Graves' disease Mother     No Known Problems Brother     Colon cancer Neg Hx     Diabetes Neg Hx     Ovarian cancer Neg Hx     Congenital heart disease Neg Hx     Pacemaker/defibrilator Neg Hx     Early death Neg Hx      Social History     Tobacco Use    Smoking status: Former Smoker    Smokeless tobacco: Never Used   Substance Use Topics    Alcohol use: Yes     Comment: social     Drug use: No     Review of Systems   Constitutional: Negative for fever.   Eyes: Negative for visual disturbance.   Respiratory: Negative for shortness of breath.    Cardiovascular: Negative for chest pain.   Gastrointestinal: Negative for abdominal pain.   Genitourinary: Positive for vaginal discharge. Negative for pelvic pain and vaginal bleeding.   Musculoskeletal: Negative for back pain.   Skin: Negative for rash.   Neurological: Negative for light-headedness.   Hematological: Does not bruise/bleed easily.  "  Psychiatric/Behavioral: The patient is not nervous/anxious.        Physical Exam     Initial Vitals   BP Pulse Resp Temp SpO2   -- -- -- -- --      MAP       --         Physical Exam    Vitals reviewed.  Constitutional: She appears well-developed and well-nourished. She is not diaphoretic. No distress.   Cardiovascular: Normal rate, regular rhythm, normal heart sounds and intact distal pulses.   Pulmonary/Chest: Breath sounds normal.   Abdominal: Soft. There is no tenderness. There is no guarding.   Neurological: She is alert and oriented to person, place, and time. She has normal strength. No sensory deficit.   Psychiatric: She has a normal mood and affect. Her behavior is normal. Judgment and thought content normal.     OB LABOR EXAM:   Pre-Term Labor: No.   Membranes ruptured: No.   Method: Sterile vaginal exam per MD and Sterile speculum exam per MD.   Vaginal Bleeding: none present.     Dilatation: 0.           Comments: No pooling, negative nitrazine, negative pooling  +yeast pseudohypae and buds on wet prep       ED Course   Procedures  Labs Reviewed - No data to display       Imaging Results    None          Medical Decision Making:   ED Management:  +doptones, Twin A: 155's, Twin B: 135's  Urine dip: WNL  SSE: no pooling, +adherent clumpy white discharge along vaginal walls, negative nitrazine, negative ferning  SVE: closed, thick  Wet pep with pseudohyphae and budding - recommended Monistat 7 day therapy for vaginal yeast infection  BSUS with consistent findings of oligohydraminos of twin A (as found on MFM ultrasound 11/30) and normal fluid around twin B. Per [facility] note, "at this gestational age and the most recent estimated fetal weight, I would not recommend intervention because given the gestational age as well as the obstetric course thus far the mortality rate with delivery exceeds 90%. I explained that we will need to continue to follow closely and reassess the risks versus benefits of delivery at " "each week and that at some point we will get to a gestational age at which we will recommend admission for  corticosteroids and consideration of delivery."  Patient to follow up closely with MFM and primary OB  ED precautions discussed  Questions answered for patient and patient's mother                      Clinical Impression:   The primary encounter diagnosis was Vaginal yeast infection. Diagnoses of 23 weeks gestation of pregnancy, Monoamniotic monochorionic twin pregnancy, antepartum, and Vaginal discharge during pregnancy in second trimester were also pertinent to this visit.                             Mary Gomez MD  18 [phone]    "

## 2018-12-02 NOTE — DISCHARGE INSTRUCTIONS
Use Monistat for yeast infection.    Call provider/return to clinic if you experience vaginal bleeding (other than spotting in the next 24 hours), leaking of fluid, 4 or more contractions in one hour, or decreased fetal movement (less than 10 movements in 2 hours).    Drink plenty of fluid - at least 10 - 12 glasses of water daily.    Call provider/return to clinic if you experience headache unrelieved by Tylenol, blurry vision, or pain in your right upper abdomen.     Your feedback is important to us.  If you should receive a survey in the next few days, please share your experience with us.

## 2018-12-03 ENCOUNTER — TELEPHONE (OUTPATIENT)
Dept: PEDIATRIC CARDIOLOGY | Facility: CLINIC | Age: 23
End: 2018-12-03

## 2018-12-03 ENCOUNTER — TELEPHONE (OUTPATIENT)
Dept: MATERNAL FETAL MEDICINE | Facility: CLINIC | Age: 23
End: 2018-12-03

## 2018-12-03 PROBLEM — O43.029 MONOCHORIONIC DIAMNIOTIC TWIN PREGNANCY WITH TWIN TO TWIN TRANSFUSION SYNDROME, ANTEPARTUM: Status: ACTIVE | Noted: 2018-12-03

## 2018-12-03 PROBLEM — O30.039 MONOCHORIONIC DIAMNIOTIC TWIN PREGNANCY WITH TWIN TO TWIN TRANSFUSION SYNDROME, ANTEPARTUM: Status: ACTIVE | Noted: 2018-12-03

## 2018-12-03 NOTE — TELEPHONE ENCOUNTER
Message left for pt to call [facility] clinic at [phone] to reschedule MFM appointment to 12/5/18.    ----- Message from Beti Allen sent at 12/3/2018  2:10 PM CST -----  Contact: Self  Pt is calling to rs her appt from Thurs to Wednesday. Pt can be reached at [phone]

## 2018-12-03 NOTE — PROGRESS NOTES
"Ms. Montesinos  is a 23 y.o. year old  , referred by Dr. Montes because of Monochorionic Diamniotic Twin gestation, s/p laser photocoagulation (10/19/18).    The patient presented at approximately 23 3/7  weeks gestation (Patient's last menstrual period was 2018 (approximate).).  The patient denied any complaints.    Past medical history: Significant for anxiety.  Otherwise unremarkable.  Past surgical history: S/P bilateral knee surgery, S/P tonsillectomy.  Past gestational history: N/A    Family history: Negative for congenital heart disease, and sudden death during childhood.    Medications:   Outpatient Encounter Medications as of 2018   Medication Sig Dispense Refill    acetaminophen (TYLENOL) 325 MG tablet Take 325 mg by mouth every 6 (six) hours as needed for Pain.      citalopram (CELEXA) 40 MG tablet Take 1 tablet (40 mg total) by mouth once daily. 30 tablet 11    phenylephrine HCl (SINEX REGULAR NASL) 1 spray by Nasal route once daily.       prenatal vit 32-iron-folic-dha 27 mg iron- 1 mg-150 mg Cmpk Take 1 tablet by mouth once daily.      [DISCONTINUED] ibuprofen (ADVIL) 200 MG tablet Take 600 mg by mouth every 6 (six) hours as needed for Pain.      ondansetron (ZOFRAN-ODT) 4 MG TbDL Take 1 tablet (4 mg total) by mouth every 8 (eight) hours as needed. 30 tablet 1    [DISCONTINUED] NIFEdipine (PROCARDIA) 10 MG Cap TK ONE C PO Q 6 H  1     No facility-administered encounter medications on file as of 2018.        Allergies: Latex, natural rubber    Blood pressure 118/80, height 5' 1" (1.549 m), weight 94.8 kg (208 lb 15.9 oz), last menstrual period 2018.    Fetal echocardiogram revealed the following findings:    Twin A (positioned in the maternal right lower abdomen.  Vertex position):  A four chamber fetal heart with situs solitus was seen.  Cardiothoracic ratio was 0.62.  The ventricles appeared to be equal in size.  The contractility of both ventricles was good.  The fetal " heart rate was within the normal range (132 - 156 bpm), and regular.  The interventricular septum appeared to be intact.  There were normally related great arteries seen.  The ductal and aortic arch were visualized, and appeared to be widely patent.  There was no pleural or pericardial effusion seen.  Fetal bladder visualized.  Fetal biometry: BPD 51.5 mm (21 5/7 weeks EGA), Femur length 40.7 mm (22 4/7 weeks EGA).    Doppler analysis revealed a three vessel umbilical cord, with normal flow patterns, and velocities by Doppler.  There was a normal flow pattern seen in the ductus venosus.  There was evidence of normal systemic, and pulmonary venous return seen.  There was a normal right to left shunt seen across the foramen ovale.  There were normal inflow patterns seen across the AV-valves, without significant insufficiency.  There was no ventricular level shunt seen.  The right and left ventricular outflow tract, and ductal and aortic arch appeared to be unobstructed.    Twin B (positioned in the maternal left upper abdomen.  Transverse position):  A four chamber fetal heart with situs solitus was seen  Cardiothoracic ratio was 0.62.  The ventricles appeared to be equal in size.  There appeared to be mild biventricular hypertrophy.  The contractility of both ventricles appeared to be mildly decreased.  The fetal heart rate was within the normal range (131 - 155 bpm), and regular.  The interventricular septum appeared to be intact.  There were normally related great arteries seen.  The ductal and aortic arch were visualized, and appeared to be widely patent.  There was no pleural or pericardial effusion seen.  Fetal bladder visualized.  Fetal biometry: Femur length 40.2 mm (22 3/7 weeks EGA).    Doppler analysis revealed a three vessel umbilical cord, with normal flow patterns, and velocities by Doppler.  There was a normal flow pattern seen in the ductus venosus.  There was evidence of normal systemic, and pulmonary  venous return seen.  There was a normal right to left shunt seen across the foramen ovale.  There were normal inflow patterns seen across the AV-valves, without significant insufficiency.  There was no ventricular level shunt seen.  The right and left ventricular outflow tract, and ductal and aortic arch appeared to be unobstructed.    Impression:  It is our impression that Ms. Montesinos had a normal fetal echocardiogram with regard to function for both twins.  Twin B (previously the recipient twin) appeared to have mild biventricular hypertrophy and mildly decreased function.  As you know, small defects, and coarctation of the aorta cannot always be ruled out on fetal echocardiogram.  We discussed our findings with the patient, reviewed our images, and answered her questions. We also discussed the limitations of fetal echocardiography.  We scheduled a follow up visit in our clinic in 4 to 5 weeks, but, of course, we will always be available to reevaluate this patient sooner, if needed.    The above information was discussed in detail including the use of diagrams, with 30 minutes of total face to face time, with greater than 50% with counseling and coordination of care.  The discussion of the diagnosis and treatment options is as described above.      Time spent: 30 minutes, 50% dedicated to counseling.

## 2018-12-03 NOTE — TELEPHONE ENCOUNTER
Pt scheduled for follow up fetal echo on 1/8/19 at [facility]. Appt confirmed and number and office location verified.

## 2018-12-05 ENCOUNTER — INITIAL CONSULT (OUTPATIENT)
Dept: MATERNAL FETAL MEDICINE | Facility: CLINIC | Age: 23
End: 2018-12-05
Attending: OBSTETRICS & GYNECOLOGY
Payer: COMMERCIAL

## 2018-12-05 VITALS — BODY MASS INDEX: 40.2 KG/M2 | SYSTOLIC BLOOD PRESSURE: 120 MMHG | DIASTOLIC BLOOD PRESSURE: 80 MMHG | WEIGHT: 212.75 LBS

## 2018-12-05 DIAGNOSIS — O41.02X1 OLIGOHYDRAMNIOS ANTEPARTUM, SECOND TRIMESTER, FETUS 1: ICD-10-CM

## 2018-12-05 DIAGNOSIS — O30.002 MONOZYGOTIC TWINS IN SECOND TRIMESTER: ICD-10-CM

## 2018-12-05 PROCEDURE — 99999 PR PBB SHADOW E&M-EST. PATIENT-LVL III: CPT | Mod: PBBFAC,,, | Performed by: OBSTETRICS & GYNECOLOGY

## 2018-12-05 PROCEDURE — 76815 OB US LIMITED FETUS(S): CPT | Mod: 59,S$GLB,, | Performed by: OBSTETRICS & GYNECOLOGY

## 2018-12-05 PROCEDURE — 76821 MIDDLE CEREBRAL ARTERY ECHO: CPT | Mod: S$GLB,,, | Performed by: OBSTETRICS & GYNECOLOGY

## 2018-12-05 PROCEDURE — 76820 UMBILICAL ARTERY ECHO: CPT | Mod: 59,S$GLB,, | Performed by: OBSTETRICS & GYNECOLOGY

## 2018-12-05 PROCEDURE — 99499 UNLISTED E&M SERVICE: CPT | Mod: S$GLB,,, | Performed by: OBSTETRICS & GYNECOLOGY

## 2018-12-05 NOTE — LETTER
December 5, 2018      Duyen Haney MD  [address]  [address]  [place] [address]           [facility]  [address] [facility] [address]  Phone: [phone]          Patient: Nina Montesinos   MR Number: [id number]   YOB: 1995   Date of Visit: 12/5/2018       Dear Dr. Duyen Haney:    Thank you for referring Nina Montesinos to me for evaluation. Attached you will find relevant portions of my assessment and plan of care.    If you have questions, please do not hesitate to call me. I look forward to following Nina Montesinos along with you.    Sincerely,    Dilip Lin MD    Enclosure  CC:  No Recipients    If you would like to receive this communication electronically, please contact [email or web address] or [phone] to request more information on [employer] access.    For providers and/or their staff who would like to refer a patient to [facility], please contact us through our one-stop-shop provider referral line, [facility], at [phone].    If you feel you have received this communication in error or would no longer like to receive these types of communications, please e-mail [email or web address]

## 2018-12-08 ENCOUNTER — ANESTHESIA (OUTPATIENT)
Dept: OBSTETRICS AND GYNECOLOGY | Facility: OTHER | Age: 23
End: 2018-12-08
Payer: COMMERCIAL

## 2018-12-08 ENCOUNTER — ANESTHESIA EVENT (OUTPATIENT)
Dept: OBSTETRICS AND GYNECOLOGY | Facility: OTHER | Age: 23
End: 2018-12-08
Payer: COMMERCIAL

## 2018-12-08 ENCOUNTER — HOSPITAL ENCOUNTER (INPATIENT)
Facility: OTHER | Age: 23
LOS: 4 days | Discharge: HOME | End: 2018-12-12
Attending: OBSTETRICS & GYNECOLOGY | Admitting: OBSTETRICS & GYNECOLOGY
Payer: COMMERCIAL

## 2018-12-08 DIAGNOSIS — Z3A.24 24 WEEKS GESTATION OF PREGNANCY: ICD-10-CM

## 2018-12-08 DIAGNOSIS — O41.1221: ICD-10-CM

## 2018-12-08 DIAGNOSIS — O43.029 MONOCHORIONIC DIAMNIOTIC TWIN PREGNANCY WITH TWIN TO TWIN TRANSFUSION SYNDROME, ANTEPARTUM: ICD-10-CM

## 2018-12-08 DIAGNOSIS — O41.1220: ICD-10-CM

## 2018-12-08 DIAGNOSIS — O30.039 MONOCHORIONIC DIAMNIOTIC TWIN PREGNANCY WITH TWIN TO TWIN TRANSFUSION SYNDROME, ANTEPARTUM: ICD-10-CM

## 2018-12-08 DIAGNOSIS — O42.012 PRETERM PREMATURE RUPTURE OF MEMBRANES (PPROM) WITH ONSET OF LABOR WITHIN 24 HOURS OF RUPTURE IN SECOND TRIMESTER, ANTEPARTUM: ICD-10-CM

## 2018-12-08 PROBLEM — F41.1 GENERALIZED ANXIETY DISORDER: Status: ACTIVE | Noted: 2018-12-08

## 2018-12-08 LAB
ABO + RH BLD: NORMAL
ALBUMIN SERPL BCP-MCNC: 2.7 G/DL
ALLENS TEST: ABNORMAL
ALP SERPL-CCNC: 146 U/L
ALT SERPL W/O P-5'-P-CCNC: 22 U/L
ANION GAP SERPL CALC-SCNC: 11 MMOL/L
AST SERPL-CCNC: 19 U/L
BASOPHILS # BLD AUTO: 0.02 K/UL
BASOPHILS NFR BLD: 0.1 %
BILIRUB SERPL-MCNC: 0.3 MG/DL
BLD GP AB SCN CELLS X3 SERPL QL: NORMAL
BUN SERPL-MCNC: 8 MG/DL
CALCIUM SERPL-MCNC: 9.1 MG/DL
CHLORIDE SERPL-SCNC: 106 MMOL/L
CO2 SERPL-SCNC: 19 MMOL/L
CREAT SERPL-MCNC: 0.6 MG/DL
DELSYS: ABNORMAL
DIFFERENTIAL METHOD: ABNORMAL
EOSINOPHIL # BLD AUTO: 0.1 K/UL
EOSINOPHIL NFR BLD: 0.4 %
ERYTHROCYTE [DISTWIDTH] IN BLOOD BY AUTOMATED COUNT: 13.2 %
EST. GFR  (AFRICAN AMERICAN): >60 ML/MIN/1.73 M^2
EST. GFR  (NON AFRICAN AMERICAN): >60 ML/MIN/1.73 M^2
FLUAV AG SPEC QL IA: NEGATIVE
FLUBV AG SPEC QL IA: NEGATIVE
GLUCOSE SERPL-MCNC: 75 MG/DL
HCO3 UR-SCNC: 20.7 MMOL/L (ref 24–28)
HCT VFR BLD AUTO: 36.9 %
HGB BLD-MCNC: 12.5 G/DL
HIV1+2 IGG SERPL QL IA.RAPID: NEGATIVE
LYMPHOCYTES # BLD AUTO: 1.7 K/UL
LYMPHOCYTES NFR BLD: 10.1 %
MCH RBC QN AUTO: 30.9 PG
MCHC RBC AUTO-ENTMCNC: 33.9 G/DL
MCV RBC AUTO: 91 FL
MONOCYTES # BLD AUTO: 0.8 K/UL
MONOCYTES NFR BLD: 5.1 %
NEUTROPHILS # BLD AUTO: 13.9 K/UL
NEUTROPHILS NFR BLD: 83.6 %
PCO2 BLDA: 33.4 MMHG (ref 35–45)
PH SMN: 7.4 [PH] (ref 7.35–7.45)
PLATELET # BLD AUTO: 223 K/UL
PMV BLD AUTO: 11.7 FL
PO2 BLDA: 37 MMHG (ref 80–100)
POC BE: -4 MMOL/L
POC SATURATED O2: 71 % (ref 95–100)
POTASSIUM SERPL-SCNC: 3.9 MMOL/L
PROT SERPL-MCNC: 7.1 G/DL
RBC # BLD AUTO: 4.05 M/UL
RUPTURE OF MEMBRANE: POSITIVE
SAMPLE: ABNORMAL
SITE: ABNORMAL
SODIUM SERPL-SCNC: 136 MMOL/L
SPECIMEN SOURCE: NORMAL
WBC # BLD AUTO: 16.58 K/UL

## 2018-12-08 PROCEDURE — 59514 CESAREAN DELIVERY ONLY: CPT | Mod: 76,82,AT, | Performed by: OBSTETRICS & GYNECOLOGY

## 2018-12-08 PROCEDURE — 63600175 PHARM REV CODE 636 W HCPCS: Performed by: OBSTETRICS & GYNECOLOGY

## 2018-12-08 PROCEDURE — 25000003 PHARM REV CODE 250: Performed by: OBSTETRICS & GYNECOLOGY

## 2018-12-08 PROCEDURE — 71000033 HC RECOVERY, INTIAL HOUR: Performed by: OBSTETRICS & GYNECOLOGY

## 2018-12-08 PROCEDURE — 59510 CESAREAN DELIVERY: CPT | Mod: 76,AT,, | Performed by: OBSTETRICS & GYNECOLOGY

## 2018-12-08 PROCEDURE — 63600175 PHARM REV CODE 636 W HCPCS: Performed by: STUDENT IN AN ORGANIZED HEALTH CARE EDUCATION/TRAINING PROGRAM

## 2018-12-08 PROCEDURE — 36004725 HC OB OR TIME LEV III - EA ADD 15 MIN: Performed by: OBSTETRICS & GYNECOLOGY

## 2018-12-08 PROCEDURE — 86703 HIV-1/HIV-2 1 RESULT ANTBDY: CPT

## 2018-12-08 PROCEDURE — 87040 BLOOD CULTURE FOR BACTERIA: CPT

## 2018-12-08 PROCEDURE — S0077 INJECTION, CLINDAMYCIN PHOSP: HCPCS | Performed by: OBSTETRICS & GYNECOLOGY

## 2018-12-08 PROCEDURE — 96372 THER/PROPH/DIAG INJ SC/IM: CPT

## 2018-12-08 PROCEDURE — 51702 INSERT TEMP BLADDER CATH: CPT

## 2018-12-08 PROCEDURE — 86901 BLOOD TYPING SEROLOGIC RH(D): CPT

## 2018-12-08 PROCEDURE — 25000003 PHARM REV CODE 250: Performed by: STUDENT IN AN ORGANIZED HEALTH CARE EDUCATION/TRAINING PROGRAM

## 2018-12-08 PROCEDURE — 80053 COMPREHEN METABOLIC PANEL: CPT

## 2018-12-08 PROCEDURE — 96365 THER/PROPH/DIAG IV INF INIT: CPT | Mod: 59

## 2018-12-08 PROCEDURE — 96366 THER/PROPH/DIAG IV INF ADDON: CPT | Mod: 59

## 2018-12-08 PROCEDURE — 59025 FETAL NON-STRESS TEST: CPT

## 2018-12-08 PROCEDURE — 86703 HIV-1/HIV-2 1 RESULT ANTBDY: CPT | Mod: 91

## 2018-12-08 PROCEDURE — 59510 CESAREAN DELIVERY: CPT | Mod: ,,, | Performed by: ANESTHESIOLOGY

## 2018-12-08 PROCEDURE — 37000008 HC ANESTHESIA 1ST 15 MINUTES: Performed by: OBSTETRICS & GYNECOLOGY

## 2018-12-08 PROCEDURE — 88307 TISSUE EXAM BY PATHOLOGIST: CPT | Mod: 26,,, | Performed by: PATHOLOGY

## 2018-12-08 PROCEDURE — 37000009 HC ANESTHESIA EA ADD 15 MINS: Performed by: OBSTETRICS & GYNECOLOGY

## 2018-12-08 PROCEDURE — 99285 EMERGENCY DEPT VISIT HI MDM: CPT | Mod: 25

## 2018-12-08 PROCEDURE — 84112 EVAL AMNIOTIC FLUID PROTEIN: CPT

## 2018-12-08 PROCEDURE — 88307 TISSUE EXAM BY PATHOLOGIST: CPT | Performed by: PATHOLOGY

## 2018-12-08 PROCEDURE — 71000039 HC RECOVERY, EACH ADD'L HOUR: Performed by: OBSTETRICS & GYNECOLOGY

## 2018-12-08 PROCEDURE — 86592 SYPHILIS TEST NON-TREP QUAL: CPT

## 2018-12-08 PROCEDURE — 36004724 HC OB OR TIME LEV III - 1ST 15 MIN: Performed by: OBSTETRICS & GYNECOLOGY

## 2018-12-08 PROCEDURE — 4A0HXCZ MEASUREMENT OF PRODUCTS OF CONCEPTION, CARDIAC RATE, EXTERNAL APPROACH: ICD-10-PCS | Performed by: OBSTETRICS & GYNECOLOGY

## 2018-12-08 PROCEDURE — 11000001 HC ACUTE MED/SURG PRIVATE ROOM

## 2018-12-08 PROCEDURE — 82803 BLOOD GASES ANY COMBINATION: CPT

## 2018-12-08 PROCEDURE — 87400 INFLUENZA A/B EACH AG IA: CPT | Mod: 59

## 2018-12-08 PROCEDURE — 36415 COLL VENOUS BLD VENIPUNCTURE: CPT

## 2018-12-08 PROCEDURE — 85025 COMPLETE CBC W/AUTO DIFF WBC: CPT

## 2018-12-08 RX ORDER — MAGNESIUM SULFATE HEPTAHYDRATE 40 MG/ML
2 INJECTION, SOLUTION INTRAVENOUS CONTINUOUS
Status: DISCONTINUED | OUTPATIENT
Start: 2018-12-08 | End: 2018-12-08

## 2018-12-08 RX ORDER — MORPHINE SULFATE 0.5 MG/ML
INJECTION, SOLUTION EPIDURAL; INTRATHECAL; INTRAVENOUS
Status: DISCONTINUED | OUTPATIENT
Start: 2018-12-08 | End: 2018-12-11

## 2018-12-08 RX ORDER — AZITHROMYCIN 250 MG/1
1000 TABLET, FILM COATED ORAL ONCE
Status: COMPLETED | OUTPATIENT
Start: 2018-12-08 | End: 2018-12-08

## 2018-12-08 RX ORDER — FENTANYL CITRATE 50 UG/ML
INJECTION, SOLUTION INTRAMUSCULAR; INTRAVENOUS
Status: DISCONTINUED | OUTPATIENT
Start: 2018-12-08 | End: 2018-12-11

## 2018-12-08 RX ORDER — AMOXICILLIN 250 MG
1 CAPSULE ORAL NIGHTLY PRN
Status: DISCONTINUED | OUTPATIENT
Start: 2018-12-08 | End: 2018-12-12 | Stop reason: HOSPADM

## 2018-12-08 RX ORDER — SODIUM CHLORIDE, SODIUM LACTATE, POTASSIUM CHLORIDE, CALCIUM CHLORIDE 600; 310; 30; 20 MG/100ML; MG/100ML; MG/100ML; MG/100ML
INJECTION, SOLUTION INTRAVENOUS CONTINUOUS
Status: DISCONTINUED | OUTPATIENT
Start: 2018-12-08 | End: 2018-12-12 | Stop reason: HOSPADM

## 2018-12-08 RX ORDER — MAGNESIUM SULFATE HEPTAHYDRATE 40 MG/ML
6 INJECTION, SOLUTION INTRAVENOUS ONCE
Status: COMPLETED | OUTPATIENT
Start: 2018-12-08 | End: 2018-12-08

## 2018-12-08 RX ORDER — SODIUM CHLORIDE, SODIUM LACTATE, POTASSIUM CHLORIDE, CALCIUM CHLORIDE 600; 310; 30; 20 MG/100ML; MG/100ML; MG/100ML; MG/100ML
1000 INJECTION, SOLUTION INTRAVENOUS CONTINUOUS
Status: DISCONTINUED | OUTPATIENT
Start: 2018-12-08 | End: 2018-12-12 | Stop reason: HOSPADM

## 2018-12-08 RX ORDER — METHYLERGONOVINE MALEATE 0.2 MG/ML
INJECTION INTRAVENOUS
Status: DISCONTINUED | OUTPATIENT
Start: 2018-12-08 | End: 2018-12-11

## 2018-12-08 RX ORDER — CLINDAMYCIN PHOSPHATE 900 MG/50ML
900 INJECTION, SOLUTION INTRAVENOUS
Status: COMPLETED | OUTPATIENT
Start: 2018-12-08 | End: 2018-12-10

## 2018-12-08 RX ORDER — OXYCODONE AND ACETAMINOPHEN 10; 325 MG/1; MG/1
1 TABLET ORAL EVERY 4 HOURS PRN
Status: DISCONTINUED | OUTPATIENT
Start: 2018-12-09 | End: 2018-12-12 | Stop reason: HOSPADM

## 2018-12-08 RX ORDER — ACETAMINOPHEN 325 MG/1
650 TABLET ORAL EVERY 6 HOURS PRN
Status: DISCONTINUED | OUTPATIENT
Start: 2018-12-08 | End: 2018-12-08 | Stop reason: SDUPTHER

## 2018-12-08 RX ORDER — ONDANSETRON 2 MG/ML
4 INJECTION INTRAMUSCULAR; INTRAVENOUS EVERY 6 HOURS PRN
Status: DISCONTINUED | OUTPATIENT
Start: 2018-12-08 | End: 2018-12-12 | Stop reason: HOSPADM

## 2018-12-08 RX ORDER — CALCIUM GLUCONATE 98 MG/ML
1 INJECTION, SOLUTION INTRAVENOUS
Status: DISCONTINUED | OUTPATIENT
Start: 2018-12-08 | End: 2018-12-08

## 2018-12-08 RX ORDER — MISOPROSTOL 200 UG/1
800 TABLET ORAL
Status: DISCONTINUED | OUTPATIENT
Start: 2018-12-08 | End: 2018-12-12 | Stop reason: HOSPADM

## 2018-12-08 RX ORDER — HYDROCORTISONE 25 MG/G
CREAM TOPICAL 3 TIMES DAILY PRN
Status: DISCONTINUED | OUTPATIENT
Start: 2018-12-08 | End: 2018-12-12 | Stop reason: HOSPADM

## 2018-12-08 RX ORDER — ACETAMINOPHEN 325 MG/1
650 TABLET ORAL EVERY 6 HOURS PRN
Status: DISCONTINUED | OUTPATIENT
Start: 2018-12-08 | End: 2018-12-12 | Stop reason: HOSPADM

## 2018-12-08 RX ORDER — OXYTOCIN/RINGER'S LACTATE 20/1000 ML
20 PLASTIC BAG, INJECTION (ML) INTRAVENOUS ONCE
Status: DISCONTINUED | OUTPATIENT
Start: 2018-12-08 | End: 2018-12-12 | Stop reason: HOSPADM

## 2018-12-08 RX ORDER — SODIUM CHLORIDE, SODIUM LACTATE, POTASSIUM CHLORIDE, CALCIUM CHLORIDE 600; 310; 30; 20 MG/100ML; MG/100ML; MG/100ML; MG/100ML
INJECTION, SOLUTION INTRAVENOUS CONTINUOUS
Status: ACTIVE | OUTPATIENT
Start: 2018-12-08 | End: 2018-12-09

## 2018-12-08 RX ORDER — MUPIROCIN 20 MG/G
OINTMENT TOPICAL
Status: DISCONTINUED | OUTPATIENT
Start: 2018-12-08 | End: 2018-12-12 | Stop reason: HOSPADM

## 2018-12-08 RX ORDER — ADHESIVE BANDAGE
30 BANDAGE TOPICAL 2 TIMES DAILY PRN
Status: DISCONTINUED | OUTPATIENT
Start: 2018-12-09 | End: 2018-12-12 | Stop reason: HOSPADM

## 2018-12-08 RX ORDER — IBUPROFEN 600 MG/1
600 TABLET ORAL EVERY 6 HOURS
Status: DISCONTINUED | OUTPATIENT
Start: 2018-12-09 | End: 2018-12-12 | Stop reason: HOSPADM

## 2018-12-08 RX ORDER — KETOROLAC TROMETHAMINE 30 MG/ML
30 INJECTION, SOLUTION INTRAMUSCULAR; INTRAVENOUS EVERY 6 HOURS
Status: COMPLETED | OUTPATIENT
Start: 2018-12-08 | End: 2018-12-09

## 2018-12-08 RX ORDER — FAMOTIDINE 10 MG/ML
INJECTION INTRAVENOUS
Status: DISPENSED
Start: 2018-12-08 | End: 2018-12-09

## 2018-12-08 RX ORDER — PHENYLEPHRINE HYDROCHLORIDE 10 MG/ML
INJECTION INTRAVENOUS
Status: DISCONTINUED | OUTPATIENT
Start: 2018-12-08 | End: 2018-12-11

## 2018-12-08 RX ORDER — LORAZEPAM 0.5 MG/1
0.5 TABLET ORAL EVERY 6 HOURS PRN
Status: DISCONTINUED | OUTPATIENT
Start: 2018-12-08 | End: 2018-12-12 | Stop reason: HOSPADM

## 2018-12-08 RX ORDER — OXYCODONE HYDROCHLORIDE 5 MG/1
10 TABLET ORAL EVERY 4 HOURS PRN
Status: ACTIVE | OUTPATIENT
Start: 2018-12-08 | End: 2018-12-09

## 2018-12-08 RX ORDER — OXYTOCIN/RINGER'S LACTATE 20/1000 ML
333 PLASTIC BAG, INJECTION (ML) INTRAVENOUS CONTINUOUS
Status: ACTIVE | OUTPATIENT
Start: 2018-12-08 | End: 2018-12-08

## 2018-12-08 RX ORDER — SODIUM CITRATE AND CITRIC ACID MONOHYDRATE 334; 500 MG/5ML; MG/5ML
30 SOLUTION ORAL
Status: DISCONTINUED | OUTPATIENT
Start: 2018-12-08 | End: 2018-12-12 | Stop reason: HOSPADM

## 2018-12-08 RX ORDER — OXYCODONE HYDROCHLORIDE 5 MG/1
5 TABLET ORAL EVERY 4 HOURS PRN
Status: ACTIVE | OUTPATIENT
Start: 2018-12-08 | End: 2018-12-09

## 2018-12-08 RX ORDER — BUPIVACAINE HYDROCHLORIDE 7.5 MG/ML
INJECTION, SOLUTION INTRASPINAL
Status: DISCONTINUED | OUTPATIENT
Start: 2018-12-08 | End: 2018-12-11

## 2018-12-08 RX ORDER — BETAMETHASONE SODIUM PHOSPHATE AND BETAMETHASONE ACETATE 3; 3 MG/ML; MG/ML
12 INJECTION, SUSPENSION INTRA-ARTICULAR; INTRALESIONAL; INTRAMUSCULAR; SOFT TISSUE ONCE
Status: DISCONTINUED | OUTPATIENT
Start: 2018-12-08 | End: 2018-12-08

## 2018-12-08 RX ORDER — BISACODYL 10 MG
10 SUPPOSITORY, RECTAL RECTAL ONCE AS NEEDED
Status: ACTIVE | OUTPATIENT
Start: 2018-12-08 | End: 2018-12-08

## 2018-12-08 RX ORDER — OXYTOCIN/RINGER'S LACTATE 20/1000 ML
41.65 PLASTIC BAG, INJECTION (ML) INTRAVENOUS CONTINUOUS
Status: ACTIVE | OUTPATIENT
Start: 2018-12-08 | End: 2018-12-08

## 2018-12-08 RX ORDER — OXYTOCIN/RINGER'S LACTATE 20/1000 ML
41.7 PLASTIC BAG, INJECTION (ML) INTRAVENOUS CONTINUOUS
Status: ACTIVE | OUTPATIENT
Start: 2018-12-08 | End: 2018-12-08

## 2018-12-08 RX ORDER — ONDANSETRON 2 MG/ML
INJECTION INTRAMUSCULAR; INTRAVENOUS
Status: DISCONTINUED | OUTPATIENT
Start: 2018-12-08 | End: 2018-12-11

## 2018-12-08 RX ORDER — CITALOPRAM 10 MG/1
10 TABLET ORAL DAILY
Status: DISCONTINUED | OUTPATIENT
Start: 2018-12-09 | End: 2018-12-12 | Stop reason: HOSPADM

## 2018-12-08 RX ORDER — OXYCODONE AND ACETAMINOPHEN 5; 325 MG/1; MG/1
1 TABLET ORAL EVERY 4 HOURS PRN
Status: DISCONTINUED | OUTPATIENT
Start: 2018-12-09 | End: 2018-12-12 | Stop reason: HOSPADM

## 2018-12-08 RX ORDER — MISOPROSTOL 200 UG/1
200 TABLET ORAL ONCE AS NEEDED
Status: ACTIVE | OUTPATIENT
Start: 2018-12-08 | End: 2018-12-08

## 2018-12-08 RX ORDER — KETOROLAC TROMETHAMINE 30 MG/ML
INJECTION, SOLUTION INTRAMUSCULAR; INTRAVENOUS
Status: DISCONTINUED | OUTPATIENT
Start: 2018-12-08 | End: 2018-12-11

## 2018-12-08 RX ORDER — ACETAMINOPHEN 325 MG/1
650 TABLET ORAL EVERY 6 HOURS
Status: COMPLETED | OUTPATIENT
Start: 2018-12-08 | End: 2018-12-09

## 2018-12-08 RX ORDER — ACETAMINOPHEN 10 MG/ML
INJECTION, SOLUTION INTRAVENOUS
Status: DISCONTINUED | OUTPATIENT
Start: 2018-12-08 | End: 2018-12-11

## 2018-12-08 RX ORDER — SODIUM CHLORIDE, SODIUM LACTATE, POTASSIUM CHLORIDE, CALCIUM CHLORIDE 600; 310; 30; 20 MG/100ML; MG/100ML; MG/100ML; MG/100ML
INJECTION, SOLUTION INTRAVENOUS CONTINUOUS PRN
Status: DISCONTINUED | OUTPATIENT
Start: 2018-12-08 | End: 2018-12-11

## 2018-12-08 RX ORDER — MUPIROCIN 20 MG/G
1 OINTMENT TOPICAL 2 TIMES DAILY
Status: DISCONTINUED | OUTPATIENT
Start: 2018-12-08 | End: 2018-12-08

## 2018-12-08 RX ORDER — DOCUSATE SODIUM 100 MG/1
200 CAPSULE, LIQUID FILLED ORAL 2 TIMES DAILY
Status: DISCONTINUED | OUTPATIENT
Start: 2018-12-08 | End: 2018-12-12 | Stop reason: HOSPADM

## 2018-12-08 RX ORDER — SIMETHICONE 80 MG
1 TABLET,CHEWABLE ORAL EVERY 6 HOURS PRN
Status: DISCONTINUED | OUTPATIENT
Start: 2018-12-08 | End: 2018-12-12 | Stop reason: HOSPADM

## 2018-12-08 RX ADMIN — CLINDAMYCIN IN 5 PERCENT DEXTROSE 900 MG: 18 INJECTION, SOLUTION INTRAVENOUS at 06:12

## 2018-12-08 RX ADMIN — Medication 3 UNITS: at 04:12

## 2018-12-08 RX ADMIN — Medication 2 UNITS: at 05:12

## 2018-12-08 RX ADMIN — ACETAMINOPHEN 650 MG: 325 TABLET ORAL at 02:12

## 2018-12-08 RX ADMIN — Medication 0.15 MG: at 03:12

## 2018-12-08 RX ADMIN — ONDANSETRON 4 MG: 2 INJECTION INTRAMUSCULAR; INTRAVENOUS at 04:12

## 2018-12-08 RX ADMIN — AZITHROMYCIN MONOHYDRATE 1000 MG: 250 TABLET ORAL at 02:12

## 2018-12-08 RX ADMIN — MAGNESIUM SULFATE HEPTAHYDRATE 6 G: 40 INJECTION, SOLUTION INTRAVENOUS at 02:12

## 2018-12-08 RX ADMIN — PHENYLEPHRINE HYDROCHLORIDE 100 MCG: 10 INJECTION INTRAVENOUS at 04:12

## 2018-12-08 RX ADMIN — BUPIVACAINE HYDROCHLORIDE IN DEXTROSE 1.6 ML: 7.5 INJECTION, SOLUTION SUBARACHNOID at 03:12

## 2018-12-08 RX ADMIN — SODIUM CHLORIDE, SODIUM LACTATE, POTASSIUM CHLORIDE, AND CALCIUM CHLORIDE: 600; 310; 30; 20 INJECTION, SOLUTION INTRAVENOUS at 03:12

## 2018-12-08 RX ADMIN — PHENYLEPHRINE HYDROCHLORIDE 100 MCG: 10 INJECTION INTRAVENOUS at 03:12

## 2018-12-08 RX ADMIN — ACETAMINOPHEN 1000 MG: 10 INJECTION, SOLUTION INTRAVENOUS at 04:12

## 2018-12-08 RX ADMIN — PHENYLEPHRINE HYDROCHLORIDE 200 MCG: 10 INJECTION INTRAVENOUS at 04:12

## 2018-12-08 RX ADMIN — MAGNESIUM SULFATE HEPTAHYDRATE 2 G/HR: 40 INJECTION, SOLUTION INTRAVENOUS at 03:12

## 2018-12-08 RX ADMIN — AMPICILLIN SODIUM 2 G: 2 INJECTION, POWDER, FOR SOLUTION INTRAMUSCULAR; INTRAVENOUS at 07:12

## 2018-12-08 RX ADMIN — METHYLERGONOVINE MALEATE 200 MCG: 0.2 INJECTION, SOLUTION INTRAMUSCULAR; INTRAVENOUS at 04:12

## 2018-12-08 RX ADMIN — DEXTROSE 2 G: 50 INJECTION, SOLUTION INTRAVENOUS at 04:12

## 2018-12-08 RX ADMIN — SODIUM CITRATE AND CITRIC ACID MONOHYDRATE 30 ML: 500; 334 SOLUTION ORAL at 03:12

## 2018-12-08 RX ADMIN — KETOROLAC TROMETHAMINE 30 MG: 30 INJECTION, SOLUTION INTRAMUSCULAR; INTRAVENOUS at 04:12

## 2018-12-08 RX ADMIN — DOCUSATE SODIUM 200 MG: 100 CAPSULE, LIQUID FILLED ORAL at 10:12

## 2018-12-08 RX ADMIN — KETOROLAC TROMETHAMINE 30 MG: 30 INJECTION, SOLUTION INTRAMUSCULAR at 10:12

## 2018-12-08 RX ADMIN — ACETAMINOPHEN 650 MG: 325 TABLET ORAL at 10:12

## 2018-12-08 RX ADMIN — FENTANYL CITRATE 10 MCG: 50 INJECTION, SOLUTION INTRAMUSCULAR; INTRAVENOUS at 03:12

## 2018-12-08 NOTE — L&D DELIVERY NOTE
Operative Report for  Delivery:    Date of Procedure: 2018     Procedure: Procedure(s) (LRB):   SECTION (N/A)       CLASSICAL  DELIVERY    Surgeon(s) and Role:     * Duyen Haney MD - Primary    Assisting Surgeon: Alicia Newell MD    A qualified resident was not available.    Pre-Operative Diagnosis: Chorioamnionitis in second trimester, fetus 1 of multiple gestation [O41.1221], Mono/di twins, Twin to Twin transfusion, PPROM    Post-Operative Diagnosis: Post-Op Diagnosis Codes:     * Chorioamnionitis in second trimester, fetus 1 of multiple gestation [O41.1221], same    Anesthesia: Spinal/Epidural    Complications: none    Findings: Twin A- VFI, Vertex presentation, Twin B, Footling breech, VFI. , normal uterus, tubes and ovaries    CLASSICAL UTERINE INCISION PERFORMED ON UTERUS    Procedure in detail:    The patient was taken to the operating room where epidural/spinal anesthesia was found to be adequate. She was then prepped and draped in the normal sterile fashion. Allis clamp was used to verify adequate anesthesia. A villeda catheter was in place.     After Time Out was performed, A scalpel was used to make a Pfannensteil incision. The knife was used to carry down to the underlying layer of fascia. The fascia was then incised in the midline. The curved de la rosa scissors were then used to extend the fascia incision laterally. The fascia was then elevated using the kocher clamps and extended both inferiorly and superiorly using the curved de la rosa scissors. The rectus muscles were then  in the midline and the peritoneum was then entered bluntly and extended but bluntly and sharply with the curved de la rosa scissors. The bladder blade was then inserted. The uterine incision was examined. The lower segment was not wide enough for a low transverse incision. There were large veins present in the low vertical area, but that was the only area to make an incision.     The inside scalpel was then  used to make a CLASSICAL UTERINE INCISION on the uterus. This was extended bluntly. The amniotic fluid was noted to be clear. The infants head of twin A was delivered atraumatically and the infant's body was delivered atraumatically. The cord was clamped and cut and the baby was handed off to awaiting NICU. THe amniotic sac of baby B was ruptured and clear fluid was noted. The baby was footling breech. The baby was delivered in the usual fashion atraumatically. The cord was clamped and cut and the baby was handed off to awaiting NICU. The placentas were then removed manually and the uterus was then exteriorized and cleared of all clots and debris. The bladder blade was then reinserted.     The classical uterine incision was then closed using a #1 Chromic in a running locked suture. A second imbricating suture of #1 chromic was used to close a second layer with excellent hemostasis. 3-0 vicryl with SH needle was used to close the serosa in a baseball stitch. A few figure of eight sutures were placed at the lower segment were the veins were with excellent hemostasis. The abdomen was then irrigated and cleared of all clots and debris. The uterine incision was then reexamined and noted to have excellent hemostasis. The uterus was then returned to the abdomen. The bladder blade was replaced and then uterine incision was then reexamined and noted to have excellent hemostasis.     The peritoneum and rectus muscles were then re approximated using 2-0 Vicryl in a running fashion. The rectus muscles were then irrigated and and bleeding areas were cauterized using the Bovie with excellent hemostasis. The fascia was then closed using #1 Vicryl in a running fashion. The fat was then cauterized for any small bleeding areas. 2-0 plain gut was then used to re approximate the fat. 4-0 Monocryl was then used to close the skin in a subcuticular fashion. The patient tolerated the procedure well. Sponge lap and needle counts were  correct x 2.               Elia Montesinos [[id number]]     Delivery Information for  Elia Montesinos    Birth information:  YOB: 2017   Time of birth: 4:17 PM   Sex: female   Head Delivery Date/Time:     Delivery type:    Gestational Age: 24w4d    Delivery Providers    Delivering clinician:             Measurements    Weight:    Length:           Apgars    Living status:    Apgars:   1 min.:   5 min.:   10 min.:   15 min.:   20 min.:     Skin color:          Heart rate:          Reflex irritability:          Muscle tone:          Respiratory effort:          Total:                                 Interventions/Resuscitation         Cord    No data filed        Placenta    Placenta delivery date/time:    Placenta removal:             Labor Events:       labor:       Labor Onset Date/Time:         Dilation Complete Date/Time:         Start Pushing Date/Time:       Rupture Date/Time:              Rupture type:           Fluid Amount:        Fluid Color:        Fluid Odor:        Membrane Status (PeriCalm): PPROM (Premature Prolonged Rupture)      Rupture Date/Time (PeriCalm): 2018 03:00:00      Fluid Amount (PeriCalm): Small      Fluid Color (PeriCalm): Clear       steroids:       Antibiotics given for GBS:       Induction:       Indications for induction:        Augmentation:       Indications for augmentation:       Labor complications:       Additional complications:          Cervical ripening:                     Delivery:      Episiotomy:       Indication for Episiotomy:       Perineal Lacerations:   Repaired:      Periurethral Laceration:   Repaired:     Labial Laceration:   Repaired:     Sulcus Laceration:   Repaired:     Vaginal Laceration:   Repaired:     Cervical Laceration:   Repaired:     Repair suture:       Repair # of packets:       Vaginal delivery QBL (mL):        QBL (mL): 0     Combined Blood Loss (mL): 0     Vaginal Sweep Performed:        Surgicount Correct:         Other providers:            Details (if applicable):  Trial of Labor      Categorization:      Priority:     Indications for :     Incision Type:       Additional  information:  Forceps:    Vacuum:    Breech:    Observed anomalies    Other (Comments):            JAYMIE Montesinos [[id number]]     Delivery Information for JAYMIE Montesinos    Birth information:  YOB: 2017   Time of birth: 4:18 PM   Sex: female   Head Delivery Date/Time:     Delivery type:    Gestational Age: 24w4d    Delivery Providers    Delivering clinician:             Measurements    Weight:    Length:           Apgars    Living status:    Apgars:   1 min.:   5 min.:   10 min.:   15 min.:   20 min.:     Skin color:          Heart rate:          Reflex irritability:          Muscle tone:          Respiratory effort:          Total:                                 Interventions/Resuscitation         Cord    No data filed        Placenta    Placenta delivery date/time:    Placenta removal:             Labor Events:       labor:       Labor Onset Date/Time:         Dilation Complete Date/Time:         Start Pushing Date/Time:       Rupture Date/Time:              Rupture type:           Fluid Amount:        Fluid Color:        Fluid Odor:        Membrane Status (PeriCalm): PPROM (Premature Prolonged Rupture)      Rupture Date/Time (PeriCalm): 2018 03:00:00      Fluid Amount (PeriCalm): Small      Fluid Color (PeriCalm): Clear       steroids:       Antibiotics given for GBS:       Induction:       Indications for induction:        Augmentation:       Indications for augmentation:       Labor complications:       Additional complications:          Cervical ripening:                     Delivery:      Episiotomy:       Indication for Episiotomy:       Perineal Lacerations:   Repaired:      Periurethral Laceration:   Repaired:     Labial  Laceration:   Repaired:     Sulcus Laceration:   Repaired:     Vaginal Laceration:   Repaired:     Cervical Laceration:   Repaired:     Repair suture:       Repair # of packets:       Vaginal delivery QBL (mL):        QBL (mL): 0     Combined Blood Loss (mL): 0     Vaginal Sweep Performed:       Surgicount Correct:         Other providers:            Details (if applicable):  Trial of Labor      Categorization:      Priority:     Indications for :     Incision Type:       Additional  information:  Forceps:    Vacuum:    Breech:    Observed anomalies    Other (Comments):

## 2018-12-08 NOTE — TRANSFER OF CARE
"Anesthesia Transfer of Care Note    Patient: Nina Montesinos    Procedure(s) Performed: Procedure(s) (LRB):   SECTION (N/A)    Patient location: PACU    Anesthesia Type: spinal    Transport from OR: Transported from OR on room air with adequate spontaneous ventilation    Post pain: adequate analgesia    Post assessment: no apparent anesthetic complications    Post vital signs: stable    Level of consciousness: awake and alert    Nausea/Vomiting: no nausea/vomiting    Complications: none    Transfer of care protocol was followed      Last vitals:   Visit Vitals  BP (!) 114/57   Pulse (!) 128   Temp 37.9 °C (100.2 °F) (Tympanic)   Resp (!) 24   Ht 5' 1" (1.549 m)   Wt 95.7 kg (211 lb)   LMP 2018 (Approximate)   SpO2 97%   Breastfeeding? No   BMI 39.87 kg/m²     "

## 2018-12-08 NOTE — H&P
HISTORY AND PHYSICAL                                                OBSTETRICS          Subjective:       Nina Montesinos is a 23 y.o.  female with IUP at 24w4d weeks gestation who presents to L&D for primary CD for chorioamnionitis in setting of  premature rupture of membranes and mono-di twins. Pertinent medical history for this pregnancy include mono-di twins s/p endoscopic laser ablation in 2nd trimester; oligohydramnios and FGR for twin A; maternal anxiety; maternal obesity .  Patient reports contractions, denies vaginal bleeding, reports LOF.   Fetal Movement: normal.     ROS:  + fever/chills  + abdominal pain  + leaking fluid since ~ 03:00  + maternal anxiety - stopped celexa last week due to nausea  All other systems reviewed and negative other than HPI.      PMHx:   Past Medical History:   Diagnosis Date    Anxiety     Depression     reported per pt    Rectal bleeding     rectal bleeding when trying to defacate - pt having surgery on May 8th, 2018    Right hand fracture     broke growth plate as a teen, no repair       PSHx:   Past Surgical History:   Procedure Laterality Date    ENDOSCOPIC LASER ABLATION   2018    Dr. Abutro, TTTS    [place] TOTAL KNEE ARTHROPLASTY Bilateral     4 total:  3 right for meniscus tears, 1 left knee meniscus tear due to sports related injuries    TONSILLECTOMY N/A        All:   Review of patient's allergies indicates:   Allergen Reactions    Latex, natural rubber Hives       Meds:   (Not in a hospital admission)    SH:   Social History     Socioeconomic History    Marital status: Single     Spouse name: Not on file    Number of children: Not on file    Years of education: Not on file    Highest education level: Not on file   Social Needs    Financial resource strain: Not on file    Food insecurity - worry: Not on file    Food insecurity - inability: Not on file    Transportation needs - medical: Not on file    Transportation needs -  "non-medical: Not on file   Occupational History    Not on file   Tobacco Use    Smoking status: Former Smoker    Smokeless tobacco: Never Used   Substance and Sexual Activity    Alcohol use: Yes     Comment: social     Drug use: No    Sexual activity: Not Currently     Partners: Male     Birth control/protection: None   Other Topics Concern    Not on file   Social History Narrative    Not on file       FH:   Family History   Problem Relation Age of Onset    Breast cancer Maternal Grandmother     Cancer Maternal Grandmother     Stroke Maternal Grandfather     Hypertension Mother     Graves' disease Mother     No Known Problems Brother     Colon cancer Neg Hx     Diabetes Neg Hx     Ovarian cancer Neg Hx     Congenital heart disease Neg Hx     Pacemaker/defibrilator Neg Hx     Early death Neg Hx        OBHx:   Obstetric History       T0      L0     SAB0   TAB0   Ectopic0   Multiple0   Live Births0       # Outcome Date GA Lbr Constantine/2nd Weight Sex Delivery Anes PTL Lv   1 Current               Obstetric Comments   Menarche 15       Objective:       Pulse (!) 112   Temp 100.2 °F (37.9 °C) (Tympanic)   Resp (!) 24   Ht 5' 1" (1.549 m)   Wt 95.7 kg (211 lb)   LMP 2018 (Approximate)   SpO2 98%   Breastfeeding? No   BMI 39.87 kg/m²     Vitals:    18 1442 18 1505   Pulse: (!) 112    Resp: (!) 24    Temp: 100.2 °F (37.9 °C)    TempSrc: Tympanic    SpO2: 98%    Weight:  95.7 kg (211 lb)   Height:  5' 1" (1.549 m)       General:   alert and cooperative; HEENT: NCAT, o/p clear   Lungs:   clear to auscultation bilaterally   Heart:   regular rate and rhythm, S1, S2 normal, no murmur, click, rub or gallop   Abdomen:  soft,+ fundal tender; bowel sounds normal; no masses,  no organomegaly   Extremities negative edema, negative erythema   FHT: 160s x 2; intermittent variable decelerations Cat 2 (reassuring)                 TOCO: q2 minutescontractions       Cervix:     Dilation: " 1cm    Effacement: 50%    Station:  -1    Consistency: soft    Position: anterior        Assessment:       24w4d weeks gestation.  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          Plan:      Risks, benefits, alternatives and possible complications have been discussed in detail with the patient.   - Consents signed and to chart  - Admit to Labor and Delivery unit  -MFM consulted and recommend delivery. Will give magnesium sulfate for seizure prophylaxis; Betamethasone given x 1; but will not be able to await window due to evidence of infection.

## 2018-12-08 NOTE — OPERATIVE NOTE ADDENDUM
Certification of Assistant at Surgery       Surgery Date: 2018     Participating Surgeons:  Surgeon(s) and Role:     * Duyen Haney MD - Primary  Assistant: Alicia Lees MD  Procedures:  Procedure(s) (LRB):   SECTION (N/A)    Assistant Surgeon's Certification of Necessity:  I understand that section 1842 (b) (6) (d) of the Social Security Act generally prohibits Medicare Part B reasonable charge payment for the services of assistants at surgery in teaching hospitals when qualified residents are available to furnish such services. I certify that the services for which payment is claimed were medically necessary, and that no qualified resident was available to perform the services. I further understand that these services are subject to post-payment review by the Medicare carrier.      Alicia Lees MD    2018  5:32 PM

## 2018-12-08 NOTE — ANESTHESIA PROCEDURE NOTES
Spinal    Diagnosis: Caesarean Section  Patient location during procedure: OR  Start time: 12/8/2018 4:51 PM  Timeout: 12/8/2018 4:50 PM  End time: 12/8/2018 4:54 PM  Staffing  Anesthesiologist: Karolina Singh MD  Resident/CRNA: Tk Akers MD  Performed: resident/CRNA   Preanesthetic Checklist  Completed: patient identified, site marked, surgical consent, pre-op evaluation, timeout performed, IV checked, risks and benefits discussed and monitors and equipment checked  Spinal Block  Patient position: sitting  Prep: ChloraPrep  Patient monitoring: heart rate, continuous pulse ox and frequent blood pressure checks  Approach: midline  Location: L3-4  Injection technique: single shot  CSF Fluid: clear free-flowing CSF  Needle  Needle type: pencil-tip   Needle gauge: 22 G  Needle length: 3.5 in  Additional Documentation: incremental injection, negative aspiration for heme and no paresthesia on injection  Needle localization: anatomical landmarks  Assessment  Sensory level: T5   Dermatomal levels determined by pinch or prick  Ease of block: easy  Patient's tolerance of the procedure: comfortable throughout block and no complaints

## 2018-12-08 NOTE — PROGRESS NOTES
Unable to run  Venous or arterial cord blood gases on Baby A because of insufficient samples.    Unable to run arterial cord gas on Baby B because of insufficient sample.

## 2018-12-08 NOTE — ED PROVIDER NOTES
Encounter Date: 12/8/2018       History     Chief Complaint   Patient presents with    Abdominal Pain     24 yo G1@ 24 4/7 wga presents with abdominal pain and leaking fluid.  Patient states that she has had a high risk pregnancy with twin to twin transfusion that required endoscopic laser ablation.  She is being followed for oligohydramnios.  She noted clear fluid gush from her vagina about 1 hour before presenting.   She has also c/o feeling chills.            Review of patient's allergies indicates:   Allergen Reactions    Latex, natural rubber Hives     Past Medical History:   Diagnosis Date    Anxiety     Depression     reported per pt    Rectal bleeding     rectal bleeding when trying to defacate - pt having surgery on May 8th, 2018    Right hand fracture     broke growth plate as a teen, no repair     Past Surgical History:   Procedure Laterality Date    ENDOSCOPIC LASER ABLATION   11/19/2018    Dr. Aburto, TTTS    SD TOTAL KNEE ARTHROPLASTY Bilateral     4 total:  3 right for meniscus tears, 1 left knee meniscus tear due to sports related injuries    TONSILLECTOMY N/A      Family History   Problem Relation Age of Onset    Breast cancer Maternal Grandmother     Cancer Maternal Grandmother     Stroke Maternal Grandfather     Hypertension Mother     Graves' disease Mother     No Known Problems Brother     Colon cancer Neg Hx     Diabetes Neg Hx     Ovarian cancer Neg Hx     Congenital heart disease Neg Hx     Pacemaker/defibrilator Neg Hx     Early death Neg Hx      Social History     Tobacco Use    Smoking status: Former Smoker    Smokeless tobacco: Never Used   Substance Use Topics    Alcohol use: Yes     Comment: social     Drug use: No     Review of Systems   Constitutional: Positive for chills and fever.   HENT: Positive for sore throat. Negative for congestion, dental problem, facial swelling, hearing loss, mouth sores, nosebleeds, postnasal drip, rhinorrhea, sinus pressure, sinus  pain and sneezing.    Eyes: Negative.    Respiratory: Negative.    Gastrointestinal: Positive for abdominal pain. Negative for constipation, diarrhea, nausea and vomiting.   Endocrine: Negative.    Genitourinary: Positive for pelvic pain and vaginal discharge. Negative for dysuria, flank pain and genital sores.   Musculoskeletal: Negative.    Skin: Negative.    Neurological: Negative.    Hematological: Negative.    Psychiatric/Behavioral: Negative for agitation. The patient is nervous/anxious.        Physical Exam     Initial Vitals   BP Pulse Resp Temp SpO2   12/08/18 1405 12/08/18 1405 12/08/18 1442 12/08/18 1407 12/08/18 1407   131/73 (!) 126 (!) 24 (!) 101.3 °F (38.5 °C) 97 %      MAP       --                Physical Exam    Vitals reviewed.  Constitutional: She appears well-developed and well-nourished. She is not diaphoretic. She appears distressed.   HENT:   Head: Normocephalic and atraumatic.   Right Ear: External ear normal.   Left Ear: External ear normal.   Nose: Nose normal.   Mouth/Throat: Oropharynx is clear and moist. No oropharyngeal exudate.   Eyes: Conjunctivae and EOM are normal. Right eye exhibits no discharge. Left eye exhibits no discharge. No scleral icterus.   Neck: Neck supple. No thyromegaly present. No tracheal deviation present.   Cardiovascular: Regular rhythm, normal heart sounds and intact distal pulses.   tachycardic   Pulmonary/Chest: Breath sounds normal.   Abdominal: Soft. Bowel sounds are normal. She exhibits no distension. There is tenderness. There is no guarding.   Gravid; + tenderness at fundus   Musculoskeletal: Normal range of motion. She exhibits no edema or tenderness.   Lymphadenopathy:     She has no cervical adenopathy.   Neurological: She is alert and oriented to person, place, and time. She has normal reflexes.   Skin: Skin is warm. Capillary refill takes less than 2 seconds.   Psychiatric: She has a normal mood and affect. Her behavior is normal. Judgment and thought  content normal.     OB LABOR EXAM:   Pre-Term Labor: Yes.   Membranes ruptured: Yes.   Method: Sterile vaginal exam per MD.   Vaginal Bleeding: none present.   Engagement: engaged.   Dilatation: 1.   Station: -1.   Effacement: 50%.   Amniotic Fluid Color: normal and cloudy.   Amniotic Fluid Amount: small.   Comments: Grossly ruptured in appearance       ED Course   Obtain Fetal nonstress test (NST)  Date/Time: 12/8/2018 2:45 PM  Performed by: Alicia Lees MD  Authorized by: Alicia Lees MD     Nonstress Test:     Variability:  6-25 BPM    Decelerations:  Variable    Accelerations:  10 bpm    Acoustic Stimulator: No      Baseline:  160    Contractions:  Regular    Contraction Frequency:  2-3  Biophysical Profile:     Nonstress Test Interpretation: appropriate for gestational age      Overall Impression:  Reassuring  Post-procedure:      Twin A: 160s  Twin B: 160s moderate variability with mild variable decelerations      Labs Reviewed   CBC W/ AUTO DIFFERENTIAL - Abnormal; Notable for the following components:       Result Value    WBC 16.58 (*)     Hematocrit 36.9 (*)     Gran # (ANC) 13.9 (*)     Gran% 83.6 (*)     Lymph% 10.1 (*)     All other components within normal limits   COMPREHENSIVE METABOLIC PANEL - Abnormal; Notable for the following components:    CO2 19 (*)     Albumin 2.7 (*)     Alkaline Phosphatase 146 (*)     All other components within normal limits   CULTURE, URINE   CULTURE, BLOOD   CULTURE, BLOOD   RUPTURE OF MEMBRANE   RAPID HIV   INFLUENZA A AND B ANTIGEN   RPR   HIV 1 / 2 ANTIBODY   TYPE & SCREEN          Imaging Results    None          Medical Decision Making:   History:   Old Medical Records: I decided to obtain old medical records.  Old Records Summarized: records from clinic visits.       <> Summary of Records: Prenatal records and ultrasound reviewed  Initial Assessment:   PPROM @ 24 4/7 wga  Fever and abdominal pain consistent with chorioamnionitis  TTTS twin A is  smaller with suspected IUGR  ED Management:  IV started  Betamethasone  IV antibiotics/IV magnesium  Other:   I have discussed this case with another health care provider.       <> Summary of the Discussion: [facility] Dr Delgado called and case reviewed; in setting of maternal fever with clinical chorioamnionitis  In PPROM, recommendation is to deliver.    We will give magnesium sulfate for fetal neuro-protection; we will NOT wait for steroid window due to risk of infection.                   ED Course as of Dec 08 152   Sat Dec 08, 2018   1414 Patient evaluated - screaming in pain; c/o rupture of membranes - has mono/di twins with TTTS; cervix: 50/-1; limited ultrasound: Twin A cephalic - oligohydramnios; + FHR; Twin B breech - maternal right; grossly low fluid; + cardiac activity and movement noted.  [AV]   1416 Patient with anxiety - did not tolerate pelvic exam well. Will send ROM+ - appears to have gross rupture of membranes.  Patient is febrile: 100.4  [AV]   1430 Physical exam significant for fundal tenderness; very concerned for chorioamnionitits.  [AV]   1431 Betamethasone 12 mg IM x 1 given  [AV]   1431 FHR 160s x 2  [AV]      ED Course User Index  [AV] Alicia Lees MD     Clinical Impression:   The primary encounter diagnosis was  premature rupture of membranes (PPROM) with onset of labor within 24 hours of rupture in second trimester, antepartum. 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                             Alicia Lees MD  [phone]

## 2018-12-08 NOTE — ANESTHESIA PREPROCEDURE EVALUATION
2018  Nina Montesinos is a 23 y.o. female  at 24w4d with PMHx of depression and current mono-di twin pregnancy with known oligohydramnios and twin-twin transfusion syndrome who presents with chills and tachycardia as well as fluid leakage. She is being admitted for  2/2 suspected chorioamnionitis.     Last meal was crawfish bisque at 10 AM.     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 Aburto, TTTS    MS TOTAL KNEE ARTHROPLASTY Bilateral     4 total:  3 right for meniscus tears, 1 left knee meniscus tear due to sports related injuries    TONSILLECTOMY N/A        Social History     Socioeconomic History    Marital status: Single     Spouse name: Not on file    Number of children: Not on file    Years of education: Not on file    Highest education level: Not on file   Social Needs    Financial resource strain: Not on file    Food insecurity - worry: Not on file    Food insecurity - inability: Not on file    Transportation needs - medical: Not on file    Transportation needs - non-medical: Not on file   Occupational History    Not on file   Tobacco Use    Smoking status: Former Smoker     Smokeless tobacco: Never Used   Substance and Sexual Activity    Alcohol use: Yes     Comment: social     Drug use: No    Sexual activity: Not Currently     Partners: Male     Birth control/protection: None   Other Topics Concern    Not on file   Social History Narrative    Not on file         Chemistry        Component Value Date/Time     09/05/2018 1057    K 4.0 09/05/2018 1057     09/05/2018 1057    CO2 23 09/05/2018 1057    BUN 7 09/05/2018 1057    CREATININE 0.7 09/05/2018 1057    GLU 76 09/05/2018 1057    GLU 76 09/05/2018 1057        Component Value Date/Time    CALCIUM 9.4 09/05/2018 1057    ALKPHOS 180 11/21/2008 1221    AST 14 11/21/2008 1221    ALT 12 11/21/2008 1221    BILITOT 0.3 11/21/2008 1221    ESTGFRAFRICA >60 09/05/2018 1057    EGFRNONAA >60 09/05/2018 1057            Lab Results   Component Value Date    WBC 16.58 (H) 12/08/2018    HGB 12.5 12/08/2018    HCT 36.9 (L) 12/08/2018    MCV 91 12/08/2018     12/08/2018       No results for input(s): PT, INR, PROTIME, APTT in the last 72 hours.    Anesthesia Evaluation    I have reviewed the Patient Summary Reports.     I have reviewed the Medications.     Review of Systems  Anesthesia Hx:  No previous Anesthesia  History of prior surgery of interest to airway management or planning: Denies Family Hx of Anesthesia complications.   Denies Personal Hx of Anesthesia complications.   Hematology/Oncology:  Hematology Normal   Oncology Normal     EENT/Dental:EENT/Dental Normal   Cardiovascular:  Cardiovascular Normal     Pulmonary:  Pulmonary Normal    Renal/:  Renal/ Normal     Hepatic/GI:  Hepatic/GI Normal    Neurological:  Neurology Normal    Endocrine:  Endocrine Normal    Psych:   Psychiatric History depression          Physical Exam  General:  Well nourished    Airway/Jaw/Neck:  Airway Findings: Mouth Opening: Normal Tongue: Normal  General Airway Assessment: Adult  Mallampati: II  Improves to I with phonation.  TM Distance:  Normal, at least 6 cm     Eyes/Ears/Nose:  EYES/EARS/NOSE FINDINGS: Normal   Dental:  Dental Findings: In tact   Chest/Lungs:  Chest/Lungs Findings: Clear to auscultation, Normal Respiratory Rate     Heart/Vascular:  Heart Findings: Rate: Normal        Mental Status:  Mental Status Findings:  Cooperative, Alert and Oriented         Anesthesia Plan  Type of Anesthesia, risks & benefits discussed:  Anesthesia Type:  CSE, epidural, general, MAC, spinal  Patient's Preference:   Intra-op Monitoring Plan: standard ASA monitors  Intra-op Monitoring Plan Comments:   Post Op Pain Control Plan:   Post Op Pain Control Plan Comments:   Induction:   IV  Beta Blocker:  Patient is not currently on a Beta-Blocker (No further documentation required).       Informed Consent: Patient understands risks and agrees with Anesthesia plan.  Questions answered. Anesthesia consent signed with patient.  ASA Score: 3     Day of Surgery Review of History & Physical: I have interviewed and examined the patient. I have reviewed the patient's H&P dated:    H&P update referred to the surgeon.         Ready For Surgery From Anesthesia Perspective.

## 2018-12-09 PROBLEM — O42.012 PRETERM PREMATURE RUPTURE OF MEMBRANES WITH ONSET OF LABOR WITHIN 24 HOURS OF RUPTURE IN SECOND TRIMESTER: Status: RESOLVED | Noted: 2018-12-08 | Resolved: 2018-12-09

## 2018-12-09 PROBLEM — O41.02X1 OLIGOHYDRAMNIOS ANTEPARTUM, SECOND TRIMESTER, FETUS 1: Status: RESOLVED | Noted: 2018-12-05 | Resolved: 2018-12-09

## 2018-12-09 LAB
BASOPHILS # BLD AUTO: ABNORMAL K/UL
BASOPHILS NFR BLD: 0 %
DIFFERENTIAL METHOD: ABNORMAL
EOSINOPHIL # BLD AUTO: ABNORMAL K/UL
EOSINOPHIL NFR BLD: 0 %
ERYTHROCYTE [DISTWIDTH] IN BLOOD BY AUTOMATED COUNT: 12.9 %
GIANT PLATELETS BLD QL SMEAR: PRESENT
HCT VFR BLD AUTO: 31.6 %
HGB BLD-MCNC: 10.8 G/DL
LYMPHOCYTES # BLD AUTO: ABNORMAL K/UL
LYMPHOCYTES NFR BLD: 6 %
MCH RBC QN AUTO: 30.9 PG
MCHC RBC AUTO-ENTMCNC: 34.2 G/DL
MCV RBC AUTO: 90 FL
MONOCYTES # BLD AUTO: ABNORMAL K/UL
MONOCYTES NFR BLD: 2 %
NEUTROPHILS # BLD AUTO: ABNORMAL K/UL
NEUTROPHILS NFR BLD: 86 %
NEUTS BAND NFR BLD MANUAL: 6 %
PLATELET # BLD AUTO: 207 K/UL
PLATELET BLD QL SMEAR: ABNORMAL
PMV BLD AUTO: 11.3 FL
RBC # BLD AUTO: 3.5 M/UL
WBC # BLD AUTO: 25.68 K/UL

## 2018-12-09 PROCEDURE — 85007 BL SMEAR W/DIFF WBC COUNT: CPT

## 2018-12-09 PROCEDURE — 63600175 PHARM REV CODE 636 W HCPCS: Performed by: OBSTETRICS & GYNECOLOGY

## 2018-12-09 PROCEDURE — 63600175 PHARM REV CODE 636 W HCPCS: Performed by: STUDENT IN AN ORGANIZED HEALTH CARE EDUCATION/TRAINING PROGRAM

## 2018-12-09 PROCEDURE — 25000003 PHARM REV CODE 250: Performed by: OBSTETRICS & GYNECOLOGY

## 2018-12-09 PROCEDURE — 11000001 HC ACUTE MED/SURG PRIVATE ROOM

## 2018-12-09 PROCEDURE — 99024 POSTOP FOLLOW-UP VISIT: CPT | Mod: ,,, | Performed by: OBSTETRICS & GYNECOLOGY

## 2018-12-09 PROCEDURE — 85027 COMPLETE CBC AUTOMATED: CPT

## 2018-12-09 PROCEDURE — S0077 INJECTION, CLINDAMYCIN PHOSP: HCPCS | Performed by: OBSTETRICS & GYNECOLOGY

## 2018-12-09 PROCEDURE — 36415 COLL VENOUS BLD VENIPUNCTURE: CPT

## 2018-12-09 PROCEDURE — 25000003 PHARM REV CODE 250: Performed by: STUDENT IN AN ORGANIZED HEALTH CARE EDUCATION/TRAINING PROGRAM

## 2018-12-09 RX ADMIN — CLINDAMYCIN IN 5 PERCENT DEXTROSE 900 MG: 18 INJECTION, SOLUTION INTRAVENOUS at 11:12

## 2018-12-09 RX ADMIN — ACETAMINOPHEN 650 MG: 325 TABLET ORAL at 04:12

## 2018-12-09 RX ADMIN — CITALOPRAM HYDROBROMIDE 10 MG: 10 TABLET ORAL at 08:12

## 2018-12-09 RX ADMIN — AMPICILLIN SODIUM 2 G: 2 INJECTION, POWDER, FOR SOLUTION INTRAMUSCULAR; INTRAVENOUS at 01:12

## 2018-12-09 RX ADMIN — CLINDAMYCIN IN 5 PERCENT DEXTROSE 900 MG: 18 INJECTION, SOLUTION INTRAVENOUS at 07:12

## 2018-12-09 RX ADMIN — OXYCODONE HYDROCHLORIDE AND ACETAMINOPHEN 1 TABLET: 5; 325 TABLET ORAL at 09:12

## 2018-12-09 RX ADMIN — DOCUSATE SODIUM 200 MG: 100 CAPSULE, LIQUID FILLED ORAL at 08:12

## 2018-12-09 RX ADMIN — KETOROLAC TROMETHAMINE 30 MG: 30 INJECTION, SOLUTION INTRAMUSCULAR at 04:12

## 2018-12-09 RX ADMIN — CLINDAMYCIN IN 5 PERCENT DEXTROSE 900 MG: 18 INJECTION, SOLUTION INTRAVENOUS at 03:12

## 2018-12-09 RX ADMIN — IBUPROFEN 600 MG: 600 TABLET ORAL at 11:12

## 2018-12-09 RX ADMIN — GENTAMICIN SULFATE 478.4 MG: 40 INJECTION, SOLUTION INTRAMUSCULAR; INTRAVENOUS at 04:12

## 2018-12-09 RX ADMIN — KETOROLAC TROMETHAMINE 30 MG: 30 INJECTION, SOLUTION INTRAMUSCULAR at 11:12

## 2018-12-09 RX ADMIN — ACETAMINOPHEN 650 MG: 325 TABLET ORAL at 05:12

## 2018-12-09 RX ADMIN — IBUPROFEN 600 MG: 600 TABLET ORAL at 05:12

## 2018-12-09 RX ADMIN — AMPICILLIN SODIUM 2 G: 2 INJECTION, POWDER, FOR SOLUTION INTRAMUSCULAR; INTRAVENOUS at 02:12

## 2018-12-09 RX ADMIN — ACETAMINOPHEN 650 MG: 325 TABLET ORAL at 11:12

## 2018-12-09 RX ADMIN — AMPICILLIN SODIUM 2 G: 2 INJECTION, POWDER, FOR SOLUTION INTRAMUSCULAR; INTRAVENOUS at 08:12

## 2018-12-09 NOTE — PROGRESS NOTES
[facility]  Obstetrics  Postpartum Progress Note    Patient Name: Nina Montesinos  MRN: [id number]  Admission Date: 12/8/2018  Hospital Length of Stay: 1 days  Attending Physician: Duyen Haney MD  Primary Care Provider: Primary Doctor No    Subjective:     Principal Problem:Chorioamnionitis in second trimester    Hospital course: POD#1: Patient is without unusual complaints.  Brown just removed this am. Babies in NICU.      She is doing well this morning. She is tolerating a regular diet without nausea or vomiting. She is not voiding spontaneously. She is ambulating. She has not passed flatus, and has not a BM. Vaginal bleeding is mild. She denies fever or chills. Abdominal pain is mild and controlled with oral medications. She is breastfeeding.     Objective:     Vital Signs (Most Recent):  Temp: 98 °F (36.7 °C) (12/09/18 0400)  Pulse: 70 (12/09/18 0400)  Resp: 16 (12/09/18 0400)  BP: (!) 101/55 (12/09/18 0400)  SpO2: 95 % (12/09/18 0400) Vital Signs (24h Range):  Temp:  [97 °F (36.1 °C)-101.3 °F (38.5 °C)] 98 °F (36.7 °C)  Pulse:  [] 70  Resp:  [16-24] 16  SpO2:  [92 %-100 %] 95 %  BP: (101-134)/(51-74) 101/55     Weight: 95.7 kg (211 lb)  Body mass index is 39.87 kg/m².      Intake/Output Summary (Last 24 hours) at 12/9/2018 0742  Last data filed at 12/9/2018 0500  Gross per 24 hour   Intake 2200 ml   Output 2875 ml   Net -675 ml       Significant Labs:  Lab Results   Component Value Date    GROUPTRH O POS 12/08/2018    HEPBSAG Negative 09/05/2018     Recent Labs   Lab 12/09/18  0454   HGB 10.8*   HCT 31.6*       I have personallly reviewed all pertinent lab results from the last 24 hours.    Physical Exam:   Constitutional: She is oriented to person, place, and time. She appears well-developed and well-nourished. No distress.       Cardiovascular: Normal rate.     Pulmonary/Chest: No respiratory distress.        Abdominal: Soft. She exhibits abdominal incision. She exhibits no  distension. There is no tenderness. There is no rebound and no guarding.   Dressing intact, no active drainage noted.      Genitourinary:   Genitourinary Comments: Fundus firm, NT           Musculoskeletal: She exhibits no tenderness.   SCDs in place       Neurological: She is alert and oriented to person, place, and time.    Skin: Skin is warm and dry.    Psychiatric: She has a normal mood and affect.       Assessment/Plan:     23 y.o. female  for:     delivery delivered    Ambulation encouraged, routine post op management  Will continue antibiotics until tomorrow, currently afebrile.         Disposition: As patient meets milestones, will plan to discharge on POD#4.      Louise Platt MD  Obstetrics  [facility]

## 2018-12-09 NOTE — HOSPITAL COURSE
"POD#1: Patient is without unusual complaints.  Brown just removed this am. Babies in NICU.  POD#2: patient feeling better; using breast pump; no further fevers; care and information about classical  delivery discussed.  POD#3: patient feeling better; getting breast milk; baby's "stable"; no further fevers  POD#4: patient is without complaints, pain controlled, babies progressing well in the NICU.   "

## 2018-12-09 NOTE — ANESTHESIA POSTPROCEDURE EVALUATION
"Anesthesia Post Evaluation    Patient: Nina Montesinos    Procedure(s) Performed: Procedure(s) (LRB):   SECTION (N/A)    Final Anesthesia Type: spinal  Patient location during evaluation: labor & delivery  Patient participation: Yes- Able to Participate  Level of consciousness: awake and alert and oriented  Post-procedure vital signs: reviewed and stable  Pain management: adequate  Airway patency: patent  PONV status at discharge: No PONV  Anesthetic complications: no      Cardiovascular status: blood pressure returned to baseline  Respiratory status: unassisted  Hydration status: euvolemic  Follow-up not needed.        Visit Vitals  BP (!) 117/59   Pulse 70   Temp 36.6 °C (97.8 °F) (Oral)   Resp 18   Ht 5' 1" (1.549 m)   Wt 95.7 kg (211 lb)   LMP 2018 (Approximate)   SpO2 97%   Breastfeeding? Unknown   BMI 39.87 kg/m²       Pain/Azeb Score: Pain Rating Prior to Med Admin: 0 (2018  4:00 AM)    Evaluation base don chart review. Patient EDWIN on attempted assessment. Ambulating with villeda removal. Spontaneous void pending.       "

## 2018-12-09 NOTE — ASSESSMENT & PLAN NOTE
Ambulation encouraged, routine post op management  Will continue antibiotics until tomorrow, currently afebrile.

## 2018-12-09 NOTE — SUBJECTIVE & OBJECTIVE
Hospital course: POD#1: Patient is without unusual complaints.  Brown just removed this am. Babies in NICU.      She is doing well this morning. She is tolerating a regular diet without nausea or vomiting. She is not voiding spontaneously. She is ambulating. She has not passed flatus, and has not a BM. Vaginal bleeding is mild. She denies fever or chills. Abdominal pain is mild and controlled with oral medications. She is breastfeeding.     Objective:     Vital Signs (Most Recent):  Temp: 98 °F (36.7 °C) (12/09/18 0400)  Pulse: 70 (12/09/18 0400)  Resp: 16 (12/09/18 0400)  BP: (!) 101/55 (12/09/18 0400)  SpO2: 95 % (12/09/18 0400) Vital Signs (24h Range):  Temp:  [97 °F (36.1 °C)-101.3 °F (38.5 °C)] 98 °F (36.7 °C)  Pulse:  [] 70  Resp:  [16-24] 16  SpO2:  [92 %-100 %] 95 %  BP: (101-134)/(51-74) 101/55     Weight: 95.7 kg (211 lb)  Body mass index is 39.87 kg/m².      Intake/Output Summary (Last 24 hours) at 12/9/2018 0742  Last data filed at 12/9/2018 0500  Gross per 24 hour   Intake 2200 ml   Output 2875 ml   Net -675 ml       Significant Labs:  Lab Results   Component Value Date    GROUPTRH O POS 12/08/2018    HEPBSAG Negative 09/05/2018     Recent Labs   Lab 12/09/18  0454   HGB 10.8*   HCT 31.6*       I have personallly reviewed all pertinent lab results from the last 24 hours.    Physical Exam:   Constitutional: She is oriented to person, place, and time. She appears well-developed and well-nourished. No distress.       Cardiovascular: Normal rate.     Pulmonary/Chest: No respiratory distress.        Abdominal: Soft. She exhibits abdominal incision. She exhibits no distension. There is no tenderness. There is no rebound and no guarding.   Dressing intact, no active drainage noted.      Genitourinary:   Genitourinary Comments: Fundus firm, NT           Musculoskeletal: She exhibits no tenderness.   SCDs in place       Neurological: She is alert and oriented to person, place, and time.    Skin: Skin is  warm and dry.    Psychiatric: She has a normal mood and affect.

## 2018-12-10 LAB
HIV 1+2 AB+HIV1 P24 AG SERPL QL IA: NEGATIVE
RPR SER QL: NORMAL

## 2018-12-10 PROCEDURE — 11000001 HC ACUTE MED/SURG PRIVATE ROOM

## 2018-12-10 PROCEDURE — 63600175 PHARM REV CODE 636 W HCPCS: Performed by: OBSTETRICS & GYNECOLOGY

## 2018-12-10 PROCEDURE — 25000003 PHARM REV CODE 250: Performed by: OBSTETRICS & GYNECOLOGY

## 2018-12-10 PROCEDURE — 99024 POSTOP FOLLOW-UP VISIT: CPT | Mod: ,,, | Performed by: OBSTETRICS & GYNECOLOGY

## 2018-12-10 PROCEDURE — S0077 INJECTION, CLINDAMYCIN PHOSP: HCPCS | Performed by: OBSTETRICS & GYNECOLOGY

## 2018-12-10 RX ADMIN — IBUPROFEN 600 MG: 600 TABLET ORAL at 05:12

## 2018-12-10 RX ADMIN — AMPICILLIN SODIUM 2 G: 2 INJECTION, POWDER, FOR SOLUTION INTRAMUSCULAR; INTRAVENOUS at 02:12

## 2018-12-10 RX ADMIN — GENTAMICIN SULFATE 478.4 MG: 40 INJECTION, SOLUTION INTRAMUSCULAR; INTRAVENOUS at 05:12

## 2018-12-10 RX ADMIN — OXYCODONE HYDROCHLORIDE AND ACETAMINOPHEN 1 TABLET: 5; 325 TABLET ORAL at 11:12

## 2018-12-10 RX ADMIN — CITALOPRAM HYDROBROMIDE 10 MG: 10 TABLET ORAL at 08:12

## 2018-12-10 RX ADMIN — DOCUSATE SODIUM 200 MG: 100 CAPSULE, LIQUID FILLED ORAL at 08:12

## 2018-12-10 RX ADMIN — AMPICILLIN SODIUM 2 G: 2 INJECTION, POWDER, FOR SOLUTION INTRAMUSCULAR; INTRAVENOUS at 08:12

## 2018-12-10 RX ADMIN — DOCUSATE SODIUM 200 MG: 100 CAPSULE, LIQUID FILLED ORAL at 09:12

## 2018-12-10 RX ADMIN — IBUPROFEN 600 MG: 600 TABLET ORAL at 11:12

## 2018-12-10 RX ADMIN — CLINDAMYCIN IN 5 PERCENT DEXTROSE 900 MG: 18 INJECTION, SOLUTION INTRAVENOUS at 03:12

## 2018-12-10 RX ADMIN — IBUPROFEN 600 MG: 600 TABLET ORAL at 12:12

## 2018-12-10 RX ADMIN — SODIUM CHLORIDE, SODIUM LACTATE, POTASSIUM CHLORIDE, AND CALCIUM CHLORIDE 1000 ML: .6; .31; .03; .02 INJECTION, SOLUTION INTRAVENOUS at 02:12

## 2018-12-10 RX ADMIN — CLINDAMYCIN IN 5 PERCENT DEXTROSE 900 MG: 18 INJECTION, SOLUTION INTRAVENOUS at 07:12

## 2018-12-10 NOTE — PLAN OF CARE
COPIED FROM INFANT'S CHART    SOCIAL WORK DISCHARGE PLANNING ASSESSMENT     Sw completed discharge planning assessment with pt's mother in mother's room 612.  Pt's mother was easily engaged. Education on the role of  was provided. Emotional support provided throughout assessment.        Legal Name: Ana Montesinos                               :  2018         Patient Active Problem List   Diagnosis    Prematurity    Respiratory distress    Prematurity, birth weight 500-749 grams, with less than 24 completed weeks of gestation            [facility]           JESUS: 3-     Birth Weight:   0.63 kg (1 lb 6.2 oz)                Birth Length: 30.0 cm              Gestational Age: 24w4d           Apgars    Living status:  Living           Apgars:   1 min.:   5 min.:   10 min.:   15 min.:   20 min.:      Skin color:   0  2           Heart rate:   1  2           Reflex irritability:   1  1           Muscle tone:   0  1           Respiratory effort:   1  2           Total:   3  8                     Mother: Nina Montesinos, age 22,  1996  Address: [address] [place] [address]  Phone: [phone]  Employer: [employer]                    Job Title:    Education: high school diploma     Signed Birth Certificate: Mom reported that dad will not sign birth certificate until paternity results are in. Mom's support person will be pt's m.     Support person(s): Tila Ortega ([facility]) [phone] (consent to release information completed and in pt's chart)       Sibling(s): Pt has a twin named Raegan      Spiritual Affiliation: None     Commercial Insurance Coverage: No      [facility] Health Plan (formerly [place] Medicaid[place] Primary: No Secondary: No   Mom reported that she will apply for medicaid        Pediatrician: Prefers [facility].  List provided.  Mom to select MD and inform bedside RN       Nutrition: Expressed Breast Milk                Breast Pump:              No               Plans to obtain from WIC               WIC:              Mom not certified; however will apply for         Essential Items: (includes car seat, crib/bassinet/pack-n-play, clothing, bottles, diapers, etc.)  Plans to acquire by discharge      Transportation: Personal vehicle      Education: Information given on CPR classes and Physician/NNP daily rounds.      Resources Given: [facility] [employer], [facility], Medicaid transportation, Immunizations, Glossary of Commonly Used Terms, SSI Benefits, Preparing for Your Baby's Discharge Home, Support Resources for NICU Families, Insurance Coverage of Breast Pumps and Supplies, Breast Pumps through [facility], [facility], [facility], and [facility].        Potential Eligibility for SSI Benefits: Yes. Sw to provide diagnosis letter for application process.     Potential Discharge Needs:  [facility]  NICU   Phone [phone] Ext. [phone]  [email or web address]

## 2018-12-10 NOTE — LACTATION NOTE
LC reviewed pumping for NICU babies. Pump 8 or more times a day.Mother states she is a bit frustrated since she is not getting anything. EDYTA explained that this is normal and that it may take a few days before milk comes out with a pump. Mother states she was shown how to pump, clean her supplies, sterilize her set up and reviewed supply and demand. LC left phone number on mother's white board for mother to call for asst as needed.Told mother what time LC leaves the floor. Mother also told that LC can see when she calls [employer] phone and if LC does not answer, she is busy but will come as soon as possible.

## 2018-12-10 NOTE — ASSESSMENT & PLAN NOTE
Ambulation encouraged, routine post op management  Will continue antibiotics until 48 ' post op today; currently afebrile.  Classical CD implications for future pregnancy reviewed with patient and family

## 2018-12-10 NOTE — PROGRESS NOTES
[facility]  Obstetrics  Postpartum Progress Note    Patient Name: Nina Montesnios  MRN: [id number]  Admission Date: 2018  Hospital Length of Stay: 2 days  Attending Physician: Duyen Haney MD  Primary Care Provider: Primary Doctor No    Subjective:     Principal Problem:Chorioamnionitis in second trimester    Hospital course: POD#1: Patient is without unusual complaints.  Brown just removed this am. Babies in NICU.  POD#2: patient feeling better; using breast pump; no further fevers; care and information about classical  delivery discussed.    Interval History:   POD2    She is doing well this morning. She is tolerating a regular diet without nausea or vomiting. She is voiding spontaneously. She is ambulating. She has passed flatus, and has not a BM. Vaginal bleeding is mild. She denies fever or chills. Abdominal pain is moderate and controlled with oral medications. She is breastfeeding.     Objective:     Vital Signs (Most Recent):  Temp: 97.4 °F (36.3 °C) (12/10/18 0745)  Pulse: 87 (12/10/18 0745)  Resp: 18 (12/10/18 0745)  BP: 110/64 (12/10/18 0745)  SpO2: 98 % (12/10/18 0745) Vital Signs (24h Range):  Temp:  [97.4 °F (36.3 °C)-97.8 °F (36.6 °C)] 97.4 °F (36.3 °C)  Pulse:  [71-87] 87  Resp:  [18-20] 18  SpO2:  [95 %-98 %] 98 %  BP: ()/(50-64) 110/64     Weight: 95.7 kg (211 lb)  Body mass index is 39.87 kg/m².    No intake or output data in the 24 hours ending 12/10/18 1536    Significant Labs:  Lab Results   Component Value Date    GROUPTRH O POS 2018    HEPBSAG Negative 2018     Recent Labs   Lab 18  0454   HGB 10.8*   HCT 31.6*       CBC:   Recent Labs   Lab 18  0454   WBC 25.68*   RBC 3.50*   HGB 10.8*   HCT 31.6*      MCV 90   MCH 30.9   MCHC 34.2     I have personallly reviewed all pertinent lab results from the last 24 hours.    Physical Exam:   Constitutional: She is oriented to person, place, and time. She appears well-developed and  well-nourished. No distress.    HENT:   Head: Normocephalic and atraumatic.   Nose: Nose normal.    Eyes: Conjunctivae and EOM are normal.     Cardiovascular: Normal rate, regular rhythm and intact distal pulses.  Exam reveals edema.   Pulse Score: 2+   Pulmonary/Chest: Effort normal. No respiratory distress.        Abdominal: Soft. She exhibits abdominal incision. There is tenderness. There is no rebound and no guarding.   Uterus firm, non-tender and below the umbilicus  Incision: sutured, clean & dry  Appropriate post op tenderness             Musculoskeletal: Moves all extremeties. She exhibits no edema or tenderness.       Neurological: She is alert and oriented to person, place, and time.    Skin: Skin is warm and dry. She is not diaphoretic.   Breasts: non-tender, nonengorged      Psychiatric: She has a normal mood and affect. Her behavior is normal. Judgment and thought content normal.       Assessment/Plan:     23 y.o. female  for:    * Chorioamnionitis in second trimester    On IV antibiotics day #2; will stop at 48 hours post delivery      delivery delivered    Ambulation encouraged, routine post op management  Will continue antibiotics until 48 ' post op today; currently afebrile.  Classical CD implications for future pregnancy reviewed with patient and family     Generalized anxiety disorder    Continue celexa         Disposition: As patient meets milestones, will plan to discharge POD4.    Alicia Lees MD  Obstetrics  [facility]

## 2018-12-10 NOTE — LACTATION NOTE
This note was copied from a baby's chart.  Lactation Note: Met mother and grandmother at bedside; Introduced self. Mom confirmed initiating pumping last night but not pumping since then. Encouraged pumping 8 or more times in 24 hours and skin to skin care when baby able. Discussed the importance of frequent pumping especially with in first two weeks and the importance of mother's own milk for her babies. Mom voiced desire to provide breast milk. Encouraged mom to set alarm reminders to pump. Pumping supplies brought to bedside. Donor milk consent obtained per FRANCESCO Parker. [facility] handout given to mom for assistance with obtaining a personal breast pump. Breast milk pads given to mom to provide positive smell experiences for baby of mom and her breast milk. Mom encouraged to sleep and pump with pads and place in isolette with infants. Also discussed benefits of skin to skin care when able.  Encouragement and support offered to mom.   Shirin Greenberg, BSN, RN, CLC, IBCLC

## 2018-12-10 NOTE — PLAN OF CARE
Problem: Breastfeeding  Goal: Effective Breastfeeding    Intervention: Promote Breast Care and Comfort  LC visit to mother's room. Mother pumping for NICU babies.

## 2018-12-10 NOTE — PLAN OF CARE
COPIED FROM INFANT'S CHART    SOCIAL WORK DISCHARGE PLANNING ASSESSMENT     Sw completed discharge planning assessment with pt's mother in mother's room 612.  Pt's mother was easily engaged. Education on the role of  was provided. Emotional support provided throughout assessment.        Legal Name: Raegan Montesinos                        :  2018         Patient Active Problem List   Diagnosis    Prematurity, birth weight 500-749 grams, with 24 completed weeks of gestation    Respiratory distress syndrome in     Pneumothorax on right            [facility]           JESUS: [birth year]     Birth Weight:   0.75 kg (1 lb 10.5 oz)              Birth Length: 31.5 cm              Gestational Age: 24w4d           Apgars    Living status:  Living  Apgars:   1 min.:   5 min.:   10 min.:   15 min.:   20 min.:     Skin color:   0  1          Heart rate:   2  2          Reflex irritability:   1  1          Muscle tone:   1  1          Respiratory effort:   1  2          Total:   5  7          Apgars assigned by:  NICU            Mother: Nina Montesinos, age 22,  1995  Address: [address] [place] [address]  Phone: [phone]  Employer: [employer]                    Job Title:    Education: high school diploma     Signed Birth Certificate: Mom reported that dad will not sign birth certificate until paternity results are in. Mom's support person will be pt's [facility].     Support person(s): Tila Ortega ([facility]) [phone] (consent to release information completed and in pt's chart)       Sibling(s): Pt has a twin named Ana     Spiritual Affiliation: None     Commercial Insurance Coverage: No      [facility] ([place] Primary: No Secondary: No   Mom reported that she will apply for medicaid        Pediatrician: Prefers [facility].  List provided.  Mom to select MD and inform bedside RN       Nutrition: Expressed Breast  Milk               Breast Pump:              No               Plans to obtain from WIC               WIC:              Mom not certified; however will apply for         Essential Items: (includes car seat, crib/bassinet/pack-n-play, clothing, bottles, diapers, etc.)  Plans to acquire by discharge      Transportation: Personal vehicle      Education: Information given on CPR classes and Physician/NNP daily rounds.      Resources Given: [facility] [employer], [facility], Medicaid transportation, Immunizations, Glossary of Commonly Used Terms, SSI Benefits, Preparing for Your Baby's Discharge Home, Support Resources for NICU Families, Insurance Coverage of Breast Pumps and Supplies, Breast Pumps through [facility], [facility], [employer], and [facility].        Potential Eligibility for SSI Benefits: Yes. Sw to provide diagnosis letter for application process.     Potential Discharge Needs:  Early [facility]  NICU   Phone [phone]. [phone]  [email or web address]

## 2018-12-10 NOTE — LACTATION NOTE
Lactation note:  1630- LC to room, patient in NICU visiting babies. Family in room, Blue lactation NICU folder left at bedside and bag of additional bottles. LC number on board, told family to have patient call me when she returns.  1800- LC to room, note left at bedside; someone in shower, no one else in room. RN updated of attempts to contact patient.

## 2018-12-10 NOTE — SUBJECTIVE & OBJECTIVE
Hospital course: POD#1: Patient is without unusual complaints.  Brown just removed this am. Babies in NICU.  POD#2: patient feeling better; using breast pump; no further fevers; care and information about classical  delivery discussed.    Interval History:   POD2    She is doing well this morning. She is tolerating a regular diet without nausea or vomiting. She is voiding spontaneously. She is ambulating. She has passed flatus, and has not a BM. Vaginal bleeding is mild. She denies fever or chills. Abdominal pain is moderate and controlled with oral medications. She is breastfeeding.     Objective:     Vital Signs (Most Recent):  Temp: 97.4 °F (36.3 °C) (12/10/18 0745)  Pulse: 87 (12/10/18 0745)  Resp: 18 (12/10/18 0745)  BP: 110/64 (12/10/18 0745)  SpO2: 98 % (12/10/18 0745) Vital Signs (24h Range):  Temp:  [97.4 °F (36.3 °C)-97.8 °F (36.6 °C)] 97.4 °F (36.3 °C)  Pulse:  [71-87] 87  Resp:  [18-20] 18  SpO2:  [95 %-98 %] 98 %  BP: ()/(50-64) 110/64     Weight: 95.7 kg (211 lb)  Body mass index is 39.87 kg/m².    No intake or output data in the 24 hours ending 12/10/18 1536    Significant Labs:  Lab Results   Component Value Date    GROUPTRH O POS 2018    HEPBSAG Negative 2018     Recent Labs   Lab 18  0454   HGB 10.8*   HCT 31.6*       CBC:   Recent Labs   Lab 18  0454   WBC 25.68*   RBC 3.50*   HGB 10.8*   HCT 31.6*      MCV 90   MCH 30.9   MCHC 34.2     I have personallly reviewed all pertinent lab results from the last 24 hours.    Physical Exam:   Constitutional: She is oriented to person, place, and time. She appears well-developed and well-nourished. No distress.    HENT:   Head: Normocephalic and atraumatic.   Nose: Nose normal.    Eyes: Conjunctivae and EOM are normal.     Cardiovascular: Normal rate, regular rhythm and intact distal pulses.  Exam reveals edema.   Pulse Score: 2+   Pulmonary/Chest: Effort normal. No respiratory distress.        Abdominal: Soft.  She exhibits abdominal incision. There is tenderness. There is no rebound and no guarding.   Uterus firm, non-tender and below the umbilicus  Incision: sutured, clean & dry  Appropriate post op tenderness             Musculoskeletal: Moves all extremeties. She exhibits no edema or tenderness.       Neurological: She is alert and oriented to person, place, and time.    Skin: Skin is warm and dry. She is not diaphoretic.   Breasts: non-tender, nonengorged      Psychiatric: She has a normal mood and affect. Her behavior is normal. Judgment and thought content normal.

## 2018-12-11 PROCEDURE — 25000003 PHARM REV CODE 250: Performed by: OBSTETRICS & GYNECOLOGY

## 2018-12-11 PROCEDURE — 99024 POSTOP FOLLOW-UP VISIT: CPT | Mod: ,,, | Performed by: OBSTETRICS & GYNECOLOGY

## 2018-12-11 PROCEDURE — 11000001 HC ACUTE MED/SURG PRIVATE ROOM

## 2018-12-11 RX ORDER — IBUPROFEN 600 MG/1
600 TABLET ORAL EVERY 6 HOURS PRN
Qty: 60 TABLET | Refills: 0 | Status: SHIPPED | OUTPATIENT
Start: 2018-12-11 | End: 2019-03-26

## 2018-12-11 RX ORDER — DOCUSATE SODIUM 100 MG/1
200 CAPSULE, LIQUID FILLED ORAL 2 TIMES DAILY
Refills: 0 | COMMUNITY
Start: 2018-12-11 | End: 2018-12-20

## 2018-12-11 RX ORDER — OXYCODONE AND ACETAMINOPHEN 5; 325 MG/1; MG/1
1 TABLET ORAL EVERY 4 HOURS PRN
Qty: 20 TABLET | Refills: 0 | Status: SHIPPED | OUTPATIENT
Start: 2018-12-11 | End: 2018-12-20

## 2018-12-11 RX ORDER — CITALOPRAM 10 MG/1
10 TABLET ORAL DAILY
Qty: 30 TABLET | Refills: 0 | Status: SHIPPED | OUTPATIENT
Start: 2018-12-12 | End: 2018-12-20

## 2018-12-11 RX ADMIN — IBUPROFEN 600 MG: 600 TABLET ORAL at 11:12

## 2018-12-11 RX ADMIN — IBUPROFEN 600 MG: 600 TABLET ORAL at 06:12

## 2018-12-11 RX ADMIN — IBUPROFEN 600 MG: 600 TABLET ORAL at 07:12

## 2018-12-11 RX ADMIN — OXYCODONE HYDROCHLORIDE AND ACETAMINOPHEN 1 TABLET: 10; 325 TABLET ORAL at 07:12

## 2018-12-11 RX ADMIN — DOCUSATE SODIUM 200 MG: 100 CAPSULE, LIQUID FILLED ORAL at 09:12

## 2018-12-11 RX ADMIN — CITALOPRAM HYDROBROMIDE 10 MG: 10 TABLET ORAL at 09:12

## 2018-12-11 RX ADMIN — DOCUSATE SODIUM 200 MG: 100 CAPSULE, LIQUID FILLED ORAL at 11:12

## 2018-12-11 NOTE — PLAN OF CARE
Problem: Patient Care Overview  Goal: Plan of Care Review  Outcome: Ongoing (interventions implemented as appropriate)  VSS. Feeding independently. Ambulating and voiding without difficulty. Pain well controlled with PO pain meds. Incision intact with no signs of infection. No acute events this shift. No additional needs at this time.

## 2018-12-11 NOTE — ASSESSMENT & PLAN NOTE
Ambulation encouraged, routine post op management  S/p antibiotics until 48 ' post op; currently afebrile.  Classical CD implications for future pregnancy reviewed with patient and family

## 2018-12-11 NOTE — PROGRESS NOTES
"[facility]  Obstetrics  Postpartum Progress Note    Patient Name: Nina Montesinos  MRN: [id number]  Admission Date: 2018  Hospital Length of Stay: 3 days  Attending Physician: Duyen Haney MD  Primary Care Provider: Primary Doctor No    Subjective:     Principal Problem:Chorioamnionitis in second trimester    Hospital course: POD#1: Patient is without unusual complaints.  Brown just removed this am. Babies in NICU.  POD#2: patient feeling better; using breast pump; no further fevers; care and information about classical  delivery discussed.  POD#3: patient feeling better; getting breast milk; baby's "stable"; no further fevers    Interval History: POD3    She is doing well this morning. She is tolerating a regular diet without nausea or vomiting. She is voiding spontaneously. She is ambulating. She has passed flatus, and has a BM. Vaginal bleeding is mild. She denies fever or chills. Abdominal pain is moderate and controlled with oral medications. She is breastfeeding.    Objective:     Vital Signs (Most Recent):  Temp: 98.1 °F (36.7 °C) (18 0730)  Pulse: 73 (18 0730)  Resp: 17 (18)  BP: (!) 111/56 (18 07)  SpO2: 96 % (18) Vital Signs (24h Range):  Temp:  [98 °F (36.7 °C)-98.4 °F (36.9 °C)] 98.1 °F (36.7 °C)  Pulse:  [73-91] 73  Resp:  [17-18] 17  SpO2:  [95 %-96 %] 96 %  BP: (103-112)/(53-56) 111/56     Weight: 95.7 kg (211 lb)  Body mass index is 39.87 kg/m².    No intake or output data in the 24 hours ending 18 1636    Significant Labs:  Lab Results   Component Value Date    GROUPTRH O POS 2018    HEPBSAG Negative 2018     No results for input(s): HGB, HCT in the last 48 hours.    CBC: No results for input(s): WBC, RBC, HGB, HCT, PLT, MCV, MCH, MCHC in the last 48 hours.  I have personallly reviewed all pertinent lab results from the last 24 hours.    Physical Exam:   Constitutional: She is oriented to person, place, and " time. She appears well-developed and well-nourished. No distress.    HENT:   Head: Normocephalic and atraumatic.   Nose: Nose normal.    Eyes: Conjunctivae are normal.     Cardiovascular: Normal rate, regular rhythm and intact distal pulses.   Pulse Score: 2+   Pulmonary/Chest: Effort normal. No respiratory distress.        Abdominal: Soft. She exhibits abdominal incision. There is tenderness.   Uterus firm, non-tender and below the umbilicus  Incision: sutured, clean & dry             Musculoskeletal: Moves all extremeties. She exhibits edema. She exhibits no tenderness.       Neurological: She is alert and oriented to person, place, and time.    Skin: Skin is warm and dry. She is not diaphoretic.   Breasts: nontender, nonengorged    Psychiatric: She has a normal mood and affect. Her behavior is normal. Judgment and thought content normal.       Assessment/Plan:     23 y.o. female  for:    * Chorioamnionitis in second trimester    On IV antibiotics day #2; will stop at 48 hours post delivery      delivery delivered    Ambulation encouraged, routine post op management  S/p antibiotics until 48 ' post op; currently afebrile.  Classical CD implications for future pregnancy reviewed with patient and family      premature rupture of membranes (PPROM) with onset of labor within 24 hours of rupture in second trimester, antepartum    S/p delivery     Generalized anxiety disorder    Continue celexa 10 mg; titrate up as needed         Disposition: As patient meets milestones, will plan to discharge POD4.    Alicia Lees MD  Obstetrics  [facility]

## 2018-12-11 NOTE — SUBJECTIVE & OBJECTIVE
"Hospital course: POD#1: Patient is without unusual complaints.  Brown just removed this am. Babies in NICU.  POD#2: patient feeling better; using breast pump; no further fevers; care and information about classical  delivery discussed.  POD#3: patient feeling better; getting breast milk; baby's "stable"; no further fevers    Interval History: POD3    She is doing well this morning. She is tolerating a regular diet without nausea or vomiting. She is voiding spontaneously. She is ambulating. She has passed flatus, and has a BM. Vaginal bleeding is mild. She denies fever or chills. Abdominal pain is moderate and controlled with oral medications. She is breastfeeding.    Objective:     Vital Signs (Most Recent):  Temp: 98.1 °F (36.7 °C) (18)  Pulse: 73 (18)  Resp: 17 (18)  BP: (!) 111/56 (18)  SpO2: 96 % (18) Vital Signs (24h Range):  Temp:  [98 °F (36.7 °C)-98.4 °F (36.9 °C)] 98.1 °F (36.7 °C)  Pulse:  [73-91] 73  Resp:  [17-18] 17  SpO2:  [95 %-96 %] 96 %  BP: (103-112)/(53-56) 111/56     Weight: 95.7 kg (211 lb)  Body mass index is 39.87 kg/m².    No intake or output data in the 24 hours ending 18 1636    Significant Labs:  Lab Results   Component Value Date    GROUPTRH O POS 2018    HEPBSAG Negative 2018     No results for input(s): HGB, HCT in the last 48 hours.    CBC: No results for input(s): WBC, RBC, HGB, HCT, PLT, MCV, MCH, MCHC in the last 48 hours.  I have personallly reviewed all pertinent lab results from the last 24 hours.    Physical Exam:   Constitutional: She is oriented to person, place, and time. She appears well-developed and well-nourished. No distress.    HENT:   Head: Normocephalic and atraumatic.   Nose: Nose normal.    Eyes: Conjunctivae are normal.     Cardiovascular: Normal rate, regular rhythm and intact distal pulses.   Pulse Score: 2+   Pulmonary/Chest: Effort normal. No respiratory distress.        Abdominal: " Soft. She exhibits abdominal incision. There is tenderness.   Uterus firm, non-tender and below the umbilicus  Incision: sutured, clean & dry             Musculoskeletal: Moves all extremeties. She exhibits edema. She exhibits no tenderness.       Neurological: She is alert and oriented to person, place, and time.    Skin: Skin is warm and dry. She is not diaphoretic.   Breasts: nontender, nonengorged    Psychiatric: She has a normal mood and affect. Her behavior is normal. Judgment and thought content normal.

## 2018-12-12 VITALS
HEART RATE: 79 BPM | BODY MASS INDEX: 39.84 KG/M2 | TEMPERATURE: 98 F | WEIGHT: 211 LBS | DIASTOLIC BLOOD PRESSURE: 56 MMHG | RESPIRATION RATE: 18 BRPM | SYSTOLIC BLOOD PRESSURE: 109 MMHG | OXYGEN SATURATION: 95 % | HEIGHT: 61 IN

## 2018-12-12 PROBLEM — O30.039 MONOCHORIONIC DIAMNIOTIC TWIN PREGNANCY WITH TWIN TO TWIN TRANSFUSION SYNDROME, ANTEPARTUM: Status: RESOLVED | Noted: 2018-12-03 | Resolved: 2018-12-12

## 2018-12-12 PROBLEM — O43.029 MONOCHORIONIC DIAMNIOTIC TWIN PREGNANCY WITH TWIN TO TWIN TRANSFUSION SYNDROME, ANTEPARTUM: Status: RESOLVED | Noted: 2018-12-03 | Resolved: 2018-12-12

## 2018-12-12 PROCEDURE — 90715 TDAP VACCINE 7 YRS/> IM: CPT | Performed by: OBSTETRICS & GYNECOLOGY

## 2018-12-12 PROCEDURE — 63600175 PHARM REV CODE 636 W HCPCS: Performed by: OBSTETRICS & GYNECOLOGY

## 2018-12-12 PROCEDURE — 99024 POSTOP FOLLOW-UP VISIT: CPT | Mod: ,,, | Performed by: OBSTETRICS & GYNECOLOGY

## 2018-12-12 PROCEDURE — 25000003 PHARM REV CODE 250: Performed by: OBSTETRICS & GYNECOLOGY

## 2018-12-12 PROCEDURE — 90471 IMMUNIZATION ADMIN: CPT | Performed by: OBSTETRICS & GYNECOLOGY

## 2018-12-12 RX ADMIN — IBUPROFEN 600 MG: 600 TABLET ORAL at 06:12

## 2018-12-12 RX ADMIN — CITALOPRAM HYDROBROMIDE 10 MG: 10 TABLET ORAL at 09:12

## 2018-12-12 RX ADMIN — IBUPROFEN 600 MG: 600 TABLET ORAL at 12:12

## 2018-12-12 RX ADMIN — DOCUSATE SODIUM 200 MG: 100 CAPSULE, LIQUID FILLED ORAL at 09:12

## 2018-12-12 RX ADMIN — CLOSTRIDIUM TETANI TOXOID ANTIGEN (FORMALDEHYDE INACTIVATED), CORYNEBACTERIUM DIPHTHERIAE TOXOID ANTIGEN (FORMALDEHYDE INACTIVATED), BORDETELLA PERTUSSIS TOXOID ANTIGEN (GLUTARALDEHYDE INACTIVATED), BORDETELLA PERTUSSIS FILAMENTOUS HEMAGGLUTININ ANTIGEN (FORMALDEHYDE INACTIVATED), BORDETELLA PERTUSSIS PERTACTIN ANTIGEN, AND BORDETELLA PERTUSSIS FIMBRIAE 2/3 ANTIGEN 0.5 ML: 5; 2; 2.5; 5; 3; 5 INJECTION, SUSPENSION INTRAMUSCULAR at 01:12

## 2018-12-12 NOTE — LACTATION NOTE
12/11/18 1645   Equipment Type   Breast Pump Type double electric, hospital grade   Breast Pump Flange Type hard   Breast Pumping   Breast Pumping Interventions frequent pumping encouraged   Breast Pumping other (see comments)  (encouraged 8 or more in 24hrs)   LC to room, reviewed basic breastpumping education. Mother encouraged to pump 8 or more times in 24hrs. Mother reports getting 20ml last 3 pumpings, discussed using maintain setting on pump. Reviewed pump use, cleaning, sterilizing daily, milk storage/handling/labeling. Mother aware of pump rental option. Mother to call [employer] regarding pump options through insurance. LC number on board.

## 2018-12-12 NOTE — SUBJECTIVE & OBJECTIVE
"Hospital course: POD#1: Patient is without unusual complaints.  Brown just removed this am. Babies in NICU.  POD#2: patient feeling better; using breast pump; no further fevers; care and information about classical  delivery discussed.  POD#3: patient feeling better; getting breast milk; baby's "stable"; no further fevers  POD#4: patient is without complaints, pain controlled, babies progressing well in the NICU.       She is doing well this morning. She is tolerating a regular diet without nausea or vomiting. She is voiding spontaneously. She is ambulating. She has passed flatus, and has not a BM. Vaginal bleeding is mild. She denies fever or chills. Abdominal pain is moderate and controlled with oral medications. She is breastfeeding.     Objective:     Vital Signs (Most Recent):  Temp: 97.8 °F (36.6 °C) (18 07)  Pulse: 79 (18)  Resp: 18 (18)  BP: (!) 109/56 (18)  SpO2: 95 % (18) Vital Signs (24h Range):  Temp:  [97.8 °F (36.6 °C)-98.5 °F (36.9 °C)] 97.8 °F (36.6 °C)  Pulse:  [79-84] 79  Resp:  [18] 18  SpO2:  [95 %-97 %] 95 %  BP: (106-117)/(54-57) 109/56     Weight: 95.7 kg (211 lb)  Body mass index is 39.87 kg/m².    No intake or output data in the 24 hours ending 18    Significant Labs:  Lab Results   Component Value Date    GROUPTRH O POS 2018    HEPBSAG Negative 2018     No results for input(s): HGB, HCT in the last 48 hours.    I have personallly reviewed all pertinent lab results from the last 24 hours.    Physical Exam:   Constitutional: She is oriented to person, place, and time. She appears well-developed and well-nourished. No distress.       Cardiovascular: Normal rate.     Pulmonary/Chest: No respiratory distress.        Abdominal: Soft. She exhibits abdominal incision. She exhibits no distension. There is no tenderness. There is no rebound and no guarding.     Genitourinary:   Genitourinary Comments: Fundus firm, NT   "         Musculoskeletal: She exhibits edema. She exhibits no tenderness.   Trace edema       Neurological: She is alert and oriented to person, place, and time.    Skin: Skin is warm and dry.    Psychiatric: She has a normal mood and affect.

## 2018-12-12 NOTE — ASSESSMENT & PLAN NOTE
Ambulation encouraged, routine post op management  Classical CD implications for future pregnancy reviewed with patient and family  Plan for discharge later today.

## 2018-12-12 NOTE — LACTATION NOTE
EDYTA did DC teaching for NICU mother pumping for her baby. Mother has NICU Blue folder for lactation. Reviewed how to work pump, how to keep track of pumpings, how to label nicu breastmilk, how to clean pump parts and bring milk to NICU even if it is only a drop of milk. NICU uses mother's milk for mouth care so even small amounts are ok to bring to NICU. Mother aware to pump 8 or more times a day for 15-20 minutes. Mother is aware of proper techniques for transporting her breastmilk and is aware of the written instructions in her blue folder. Mother is renting a pump from [employer] Mother is using a [employer] pump at home and is aware that she can use the [employer] breastpump at the baby's bedside when she visits. Mother has the [facility] phone number and the NICU  phone number to call for further questions.

## 2018-12-12 NOTE — DISCHARGE SUMMARY
"[facility]  Obstetrics  Discharge Summary      Patient Name: Nina Montesinos  MRN: [id number]  Admission Date: 2018  Hospital Length of Stay: 4 days  Discharge Date and Time:  2018 9:25 AM  Attending Physician: Duyen Haney MD   Discharging Provider: Louise Platt MD  Primary Care Provider: Primary Doctor No    HPI: 22 y/o  s/p classical C/S for PPROM with chorioamnionitis at 24.4 weeks with mono/di twins    Procedure(s) (LRB):   SECTION (N/A)     Hospital Course:   POD#1: Patient is without unusual complaints.  Brown just removed this am. Babies in NICU.  POD#2: patient feeling better; using breast pump; no further fevers; care and information about classical  delivery discussed.  POD#3: patient feeling better; getting breast milk; baby's "stable"; no further fevers  POD#4: patient is without complaints, pain controlled, babies progressing well in the NICU.     Consults (From admission, onward)        Status Ordering Provider     Consult to Lactation  Use PRN     Provider:  (Not yet assigned)    Acknowledged FRANK LY     Inpatient consult to Anesthesiology  Once     Provider:  (Not yet assigned)    Acknowledged FRANK LY     Inpatient consult to Pediatrics  Once     Provider:  (Not yet assigned)    Acknowledged FRANK LY          Final Active Diagnoses:    Diagnosis Date Noted POA    PRINCIPAL PROBLEM:  Chorioamnionitis in second trimester [O41.1220] 2018 Yes    Generalized anxiety disorder [F41.1] 2018 Yes     premature rupture of membranes (PPROM) with onset of labor within 24 hours of rupture in second trimester, antepartum [O42.012] 2018 Yes     delivery delivered [O82] 2018 No      Problems Resolved During this Admission:    Diagnosis Date Noted Date Resolved POA     premature rupture of membranes with onset of labor within 24 hours of rupture in second " trimester [O42.012] 2018 Yes    Oligohydramnios antepartum, second trimester, fetus 1 [O41.02X1] 2018 Yes    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    Feeding Method: breast    Immunizations     Date Immunization Status Dose Route/Site Given by    18 MMR Incomplete 0.5 mL Subcutaneous/Left deltoid     18 Tdap Incomplete 0.5 mL Intramuscular/Left deltoid              Elia Montesinos [[id number]]     Delivery:    Episiotomy: None   Lacerations: None   Repair suture:     Repair # of packets: 9   Blood loss (ml): 0     Birth information:  YOB: 2017   Time of birth: 4:17 PM   Sex: female   Delivery type: , Classical   Gestational Age: 24w4d    Delivery Clinician:      Other providers:       Additional  information:  Forceps:    Vacuum:    Breech:    Observed anomalies      Living?:           APGARS  One minute Five minutes Ten minutes   Skin color:         Heart rate:         Grimace:         Muscle tone:         Breathing:         Totals: 3  8        Placenta: Delivered:       appearance     JAYMIE Montesinos Nina [[id number]]     Delivery:    Episiotomy: None   Lacerations: None   Repair suture:     Repair # of packets: 9   Blood loss (ml): 0     Birth information:  YOB: 2017   Time of birth: 4:18 PM   Sex: female   Delivery type: , Classical   Gestational Age: 24w4d    Delivery Clinician:      Other providers:       Additional  information:  Forceps:    Vacuum:    Breech:    Observed anomalies      Living?:           APGARS  One minute Five minutes Ten minutes   Skin color:         Heart rate:         Grimace:         Muscle tone:         Breathing:         Totals: 5  7        Placenta: Delivered:       appearance    Pending Diagnostic  Studies:     None          Discharged Condition: good    Disposition:     Follow Up:  Follow-up Information     Duyen Haney MD In 2 weeks.    Specialties:  Obstetrics, Gynecology, Obstetrics and Gynecology  Why:  For wound re-check  Contact information:  [address]  [address]  [place] [address]  [phone]             Duyen Haney MD In 6 weeks.    Specialties:  Obstetrics, Gynecology, Obstetrics and Gynecology  Why:  Post partum exam  Contact information:  [address]  [address]  [place] [id number] [phone]                 Patient Instructions:      BREAST PUMP FOR HOME USE   Order Comments: Baby in NICU     Order Specific Question Answer Comments   Type of pump: Electric    Weight: 95.7 kg (211 lb)    Length of need (1-99 months): 99      Call MD for:  temperature >100.4     Call MD for:  persistent nausea and vomiting or diarrhea     Call MD for:  severe uncontrolled pain     Call MD for:  redness, tenderness, or signs of infection (pain, swelling, redness, odor or green/yellow discharge around incision site)     Call MD for:   Order Comments: Vaginal bleeding greater than 1 pad an hour for more than 2 hours     No dressing needed   Order Comments: Keep incision clean and dry     Medications:  Current Discharge Medication List      START taking these medications    Details   docusate sodium (COLACE) 100 MG capsule Take 2 capsules (200 mg total) by mouth 2 (two) times daily.  Refills: 0      ibuprofen (ADVIL,MOTRIN) 600 MG tablet Take 1 tablet (600 mg total) by mouth every 6 (six) hours as needed for Pain.  Qty: 60 tablet, Refills: 0      oxyCODONE-acetaminophen (PERCOCET) 5-325 mg per tablet Take 1 tablet by mouth every 4 (four) hours as needed.  Qty: 20 tablet, Refills: 0         CONTINUE these medications which have CHANGED    Details   citalopram (CELEXA) 10 MG tablet Take 1 tablet (10 mg total) by mouth once daily.  Qty: 30 tablet, Refills: 0         CONTINUE these medications  which have NOT CHANGED    Details   acetaminophen (TYLENOL) 325 MG tablet Take 325 mg by mouth every 6 (six) hours as needed for Pain.      prenatal vit 32-iron-folic-dha 27 mg iron- 1 mg-150 mg Cmpk Take 1 tablet by mouth once daily.         STOP taking these medications       miconazole nitrate (MICONAZOLE-3) 200 mg- 2 % (9 gram) Kit Comments:   Reason for Stopping:         ondansetron (ZOFRAN-ODT) 4 MG TbDL Comments:   Reason for Stopping:         phenylephrine HCl (SINEX REGULAR NASL) Comments:   Reason for Stopping:               Louise Platt MD  Obstetrics  [facility]

## 2018-12-12 NOTE — PLAN OF CARE
Problem: Patient Care Overview  Goal: Plan of Care Review  Outcome: Outcome(s) achieved Date Met: 12/12/18  Pt doing well ambulating and voiding with no difficulty, passing gas and tolerating po no distress noted

## 2018-12-12 NOTE — PROGRESS NOTES
"[facility]  Obstetrics  Postpartum Progress Note    Patient Name: Nina Montesinos  MRN: [id number]  Admission Date: 2018  Hospital Length of Stay: 4 days  Attending Physician: Duyen Haney MD  Primary Care Provider: Primary Doctor No    Subjective:     Principal Problem:Chorioamnionitis in second trimester    Hospital course: POD#1: Patient is without unusual complaints.  Brown just removed this am. Babies in NICU.  POD#2: patient feeling better; using breast pump; no further fevers; care and information about classical  delivery discussed.  POD#3: patient feeling better; getting breast milk; baby's "stable"; no further fevers  POD#4: patient is without complaints, pain controlled, babies progressing well in the NICU.       She is doing well this morning. She is tolerating a regular diet without nausea or vomiting. She is voiding spontaneously. She is ambulating. She has passed flatus, and has not a BM. Vaginal bleeding is mild. She denies fever or chills. Abdominal pain is moderate and controlled with oral medications. She is breastfeeding.     Objective:     Vital Signs (Most Recent):  Temp: 97.8 °F (36.6 °C) (18 0700)  Pulse: 79 (18 0700)  Resp: 18 (18 0700)  BP: (!) 109/56 (18 0700)  SpO2: 95 % (18 07) Vital Signs (24h Range):  Temp:  [97.8 °F (36.6 °C)-98.5 °F (36.9 °C)] 97.8 °F (36.6 °C)  Pulse:  [79-84] 79  Resp:  [18] 18  SpO2:  [95 %-97 %] 95 %  BP: (106-117)/(54-57) 109/56     Weight: 95.7 kg (211 lb)  Body mass index is 39.87 kg/m².    No intake or output data in the 24 hours ending 18 09    Significant Labs:  Lab Results   Component Value Date    GROUPTRH O POS 2018    HEPBSAG Negative 2018     No results for input(s): HGB, HCT in the last 48 hours.    I have personallly reviewed all pertinent lab results from the last 24 hours.    Physical Exam:   Constitutional: She is oriented to person, place, and time. She appears " well-developed and well-nourished. No distress.       Cardiovascular: Normal rate.     Pulmonary/Chest: No respiratory distress.        Abdominal: Soft. She exhibits abdominal incision. She exhibits no distension. There is no tenderness. There is no rebound and no guarding.     Genitourinary:   Genitourinary Comments: Fundus firm, NT           Musculoskeletal: She exhibits edema. She exhibits no tenderness.   Trace edema       Neurological: She is alert and oriented to person, place, and time.    Skin: Skin is warm and dry.    Psychiatric: She has a normal mood and affect.       Assessment/Plan:     23 y.o. female  for:    * Chorioamnionitis in second trimester    No fever off antibiotics.       delivery delivered    Ambulation encouraged, routine post op management  Classical CD implications for future pregnancy reviewed with patient and family  Plan for discharge later today.       premature rupture of membranes (PPROM) with onset of labor within 24 hours of rupture in second trimester, antepartum    S/p delivery     Generalized anxiety disorder    Continue celexa 10 mg; titrate up as needed         Disposition: As patient meets milestones, will plan to discharge today.    Louise Platt MD  Obstetrics  [facility]

## 2018-12-12 NOTE — DISCHARGE INSTRUCTIONS
Basic lactation discharge instructions for NICU baby    Pump at least 8-10 times daily for 15-20 minutes  Wash kit after every use by rinsing with cold water, wash with hot soapy water, rinse with cold water and air dry on paper towels.  Label each bottle with baby label . Write on label date, time and medications taken.  Refrigerate breastmilk if visiting baby daily. Transport breastmilk in an insulated lunch bag or small ice chest using gel packs to keep breastmilk cold and or frozen.  If you unable to visit baby daily , freeze breastmilk and bring to hospital frozen.    Bring pumping kit to hospital while visiting baby to pump at bedside and leave with nurses.  If you need more labels or containers ask nurse taking care of baby.    Questions Call lactation consultant: [phone]    Preparation and Hygiene:    1. Shower daily.  2. Wear a clean bra each day and wash daily in warm soapy water.  3. Change wet or moist breast pads frequently.  Moist pads can promote growth of germs.  4. Actively wash your hands, paying close attention to the area around and under your fingernails, thoroughly with soap and water for 15 seconds before pumping or handling your milk.  Re-wash your hands if you touch anything (scratching your nose, answering the phone, etc) while pumping or handling your milk.   5. Before pumping your breasts, assemble the pump collection kit and have ready the sterile container and labels.  Place these items on a clean surface next to the breastpump.  6. Each time after you have finished pumping, take apart all of the parts of the breastpump collection kit and place them in a separate cleaning container (do not place them in the sink).  Be sure to remove the yellow valve from the breastshield and separate the white membrane from the yellow valve.  Rinse all of these parts with cool water.  Then use a new sponge and/or bottle brush and dishwashing detergent to clean the parts.  Rinse off the soapy water with  cool water and air dry on a clean towel covered with a clean cloth.  All parts may also be washed after each use in the top rack of a .  7. Once each day, sterilize all of the parts of the breastpump collection kit.  This can be done by boiling the kit parts for 10 minutes or by using a Quick Clean Micro-Steam Bag made by [employer]  8. If condensation appears in the tubing, continue to run the pump with the tubing attached for 1-2 minutes or until the tubing is dry.   9. Notify your babys nurse or doctor if you become ill or need to take any medication, even over-the-counter medicines.        Collection and Storage of Expressed Breastmilk:         1. Pump your breasts at least 8-10 times every 24 hours.  Double pump (both breasts at  the same time) for at least 15-20 minutes using the most suction that is comfortable.    2. Write the date and time of pumping and the name of any medications you are takingon the babys pre-printed hospital identification label.   3. Place your babys pre-printed hospital identification label on each container of breastmilk.  Additional pre-printed labels can be obtained from your babys nurse.  If your expressed breastmilk is not correctly labeled, the nurse cannot feed the milk to the baby.       4. Place a brightly colored sticker on the top of each container of milk pumped during the first 30 days.  This identifies the milk as special and having higher levels of nutrients and anti-infective properties that are so important for your baby.  Additional stickers can be obtained from the lactation consultants or your babys nurse.  5.   Do not touch the inside of the storage containers or lids.  6.      Pour the amount of expressed milk needed for 1 of your babys feedings into each   storage container. Use a new container(s) for each pumping.  Additional storage   containers can be obtained from your babys nurse.        7.       Tightly screw the lid onto the container  and place immediately into the       refrigerator fordaily transportation to the hospital.   Do not freeze your milk      unless asked to do so by your babys nurse.  However, if you are not able to      visit your baby each day, place the expressed breastmilk in the freezer.  8.   Expressed breastmilk should be refrigerated or frozen within 1 hour of      pumping.  9.      Do not store expressed breastmilk on the door of your refrigerator or freezer where the temperature is warmer.         Transportation of Expressed Breastmilk:    1. Refrigerated breastmilk or frozen milk should be packed tightly together in a cooler with frozen, blue gel-packs to keep the milk frozen.  DO NOT USE ICE CUBES (WET ICE) TO TRANSPORT FROZEN MILK.   A clean towel can be used to fill any extra space between containers of frozen milk.  2.    Bring your expressed milk from home each time you visit the baby.

## 2018-12-12 NOTE — PLAN OF CARE
Problem: Breastfeeding  Goal: Effective Breastfeeding  Outcome: Ongoing (interventions implemented as appropriate)  Mother to follow basic breastpumping education for infant in NICU.

## 2018-12-13 LAB — BACTERIA BLD CULT: NORMAL

## 2018-12-14 ENCOUNTER — TELEPHONE (OUTPATIENT)
Dept: OBSTETRICS AND GYNECOLOGY | Facility: CLINIC | Age: 23
End: 2018-12-14

## 2018-12-17 ENCOUNTER — TELEPHONE (OUTPATIENT)
Dept: OBSTETRICS AND GYNECOLOGY | Facility: CLINIC | Age: 23
End: 2018-12-17

## 2018-12-20 ENCOUNTER — POSTPARTUM VISIT (OUTPATIENT)
Dept: OBSTETRICS AND GYNECOLOGY | Facility: CLINIC | Age: 23
End: 2018-12-20
Attending: OBSTETRICS & GYNECOLOGY
Payer: COMMERCIAL

## 2018-12-20 VITALS
SYSTOLIC BLOOD PRESSURE: 114 MMHG | BODY MASS INDEX: 37.19 KG/M2 | HEIGHT: 61 IN | DIASTOLIC BLOOD PRESSURE: 74 MMHG | WEIGHT: 197 LBS

## 2018-12-20 DIAGNOSIS — F41.1 GAD (GENERALIZED ANXIETY DISORDER): ICD-10-CM

## 2018-12-20 PROCEDURE — 99024 POSTOP FOLLOW-UP VISIT: CPT | Mod: S$GLB,,, | Performed by: OBSTETRICS & GYNECOLOGY

## 2018-12-20 PROCEDURE — 99999 PR PBB SHADOW E&M-EST. PATIENT-LVL III: CPT | Mod: PBBFAC,,, | Performed by: OBSTETRICS & GYNECOLOGY

## 2018-12-20 RX ORDER — FLUOXETINE 10 MG/1
10 TABLET ORAL DAILY
Qty: 30 TABLET | Refills: 11 | Status: SHIPPED | OUTPATIENT
Start: 2018-12-20 | End: 2019-03-26

## 2018-12-20 NOTE — PROGRESS NOTES
Subjective: some pain on right side near incision, no drainage or fever. On Celexa 10 mg. During pregnancy we went as high as 40 mg right before she delivered but said she had nausea at that dose. Was decreased to 10 mg. Says some anxiety and depression. No SI, no HI. I rec we change to Prozac. Pt agrees. Rx sent. All questions answered. Baby girls in NICU doing ok. Paternity test still pending. FOB wants to wait for that until he decides if she is involved.     Patient reports no nausea or vomiting.    Activity level: Normal.    Pain control: Well controlled.  not taking pain meds  Reports no postpartum depression, overall doing well.     Objective:  Vitals:    18 0850   BP: 114/74     General appearance: Comfortable and well-appearing.    Abdomen: Abdomen is soft, non distended   Tenderness: There is no abdominal tenderness.    Wound:  Clean.  There is no dehiscence.  There is no drainage.      Assessment:   s/p  delivery- 2 week post op  Condition: In stable condition.     Plan: Prozac 10 mg, will likely need to increase in 2 weeks to 20 mg  Encourage ambulation.  Continue wound care.  Pelvic rest for 6 weeks postpartum.

## 2019-01-10 ENCOUNTER — POSTPARTUM VISIT (OUTPATIENT)
Dept: OBSTETRICS AND GYNECOLOGY | Facility: CLINIC | Age: 24
End: 2019-01-10
Attending: OBSTETRICS & GYNECOLOGY
Payer: COMMERCIAL

## 2019-01-10 VITALS
WEIGHT: 200.94 LBS | BODY MASS INDEX: 37.94 KG/M2 | SYSTOLIC BLOOD PRESSURE: 122 MMHG | HEIGHT: 61 IN | DIASTOLIC BLOOD PRESSURE: 74 MMHG

## 2019-01-10 DIAGNOSIS — Z30.430 ENCOUNTER FOR INSERTION OF MIRENA IUD: Primary | ICD-10-CM

## 2019-01-10 PROCEDURE — 0503F PR POSTPARTUM CARE VISIT: ICD-10-PCS | Mod: S$GLB,,, | Performed by: OBSTETRICS & GYNECOLOGY

## 2019-01-10 PROCEDURE — 0503F POSTPARTUM CARE VISIT: CPT | Mod: S$GLB,,, | Performed by: OBSTETRICS & GYNECOLOGY

## 2019-01-10 PROCEDURE — 99999 PR PBB SHADOW E&M-EST. PATIENT-LVL III: CPT | Mod: PBBFAC,,, | Performed by: OBSTETRICS & GYNECOLOGY

## 2019-01-10 PROCEDURE — 99999 PR PBB SHADOW E&M-EST. PATIENT-LVL III: ICD-10-PCS | Mod: PBBFAC,,, | Performed by: OBSTETRICS & GYNECOLOGY

## 2019-01-10 NOTE — LETTER
January 10, 2019      [facility]  [address], [address]  [facility] [address]  Phone: [phone]  Fax: [phone]       Patient: Nina Montesinos   YOB: 1995  Date of Visit: 01/10/2019    To Whom It May Concern:    Nina Montesinos  was at [facility] on 01/10/2019. She may return to work/school on February 8, 2019 with no restrictions. If you have any questions or concerns, or if I can be of further assistance, please do not hesitate to contact me.    Sincerely,      Duyen Haney MD

## 2019-01-10 NOTE — PROGRESS NOTES
"Subjective:       Nina Montesinos is a 23 y.o. female who presents for a postpartum visit. She is 6 weeks postpartum following a . Review the Delivery Report for details.      Twin girls are in NICU. One is doing well and the other not. May need another surgery. Increased stress for mom. Score was 20. Never started prozac because NICU said may affect milk. I rec take meds. Pt agrees. No SI, no HI. FOB sort of in the picture, DNA testing confirmed the father. Talk x 20 min. Encouraged her to take the Prozac. Seek counseling.   Also discussed birth control. Options. Pt interested in Mirena. Will order.     All questions answered    The patient's history were reviewed and updated.    Review of Systems  Review of Systems       Objective:      /74   Ht 5' 1" (1.549 m)   Wt 91.2 kg (200 lb 15.2 oz)   Breastfeeding? Yes   BMI 37.97 kg/m²    General:  alert and cooperative   Abdomen: soft, non-tender; bowel sounds normal; no masses,  no organomegaly Incision- intact, healing well, no sign of infection      Vulva:  normal   Vagina: normal vagina, no discharge, exudate, lesion, or erythema   Cervix:  no lesions   Corpus: normal size, contour, position, consistency, mobility, non-tender   Adnexa:  no mass, fullness, tenderness   Rectal Exam: Not performed.          Assessment:      6 week postpartum exam.     Plan:      1. Contraception: mirena  2. Follow up for IUD  START PROZAC AS PRESCRIBED  "

## 2019-01-11 ENCOUNTER — TELEPHONE (OUTPATIENT)
Dept: OBSTETRICS AND GYNECOLOGY | Facility: CLINIC | Age: 24
End: 2019-01-11

## 2019-01-11 NOTE — TELEPHONE ENCOUNTER
IUD device and insertion is covered but subject to a $55.00 copay. Pt aware and ready to schedule.

## 2019-01-14 ENCOUNTER — TELEPHONE (OUTPATIENT)
Dept: OBSTETRICS AND GYNECOLOGY | Facility: CLINIC | Age: 24
End: 2019-01-14

## 2019-01-14 DIAGNOSIS — Z30.430 ENCOUNTER FOR INSERTION OF MIRENA IUD: Primary | ICD-10-CM

## 2019-01-14 NOTE — TELEPHONE ENCOUNTER
----- Message from Yasmine Singh MA sent at 1/11/2019  2:41 PM CST -----  IUD device and insertion is covered but subject to a $55.00 copay. Pt aware and ready to schedule.

## 2019-01-30 ENCOUNTER — PROCEDURE VISIT (OUTPATIENT)
Dept: OBSTETRICS AND GYNECOLOGY | Facility: CLINIC | Age: 24
End: 2019-01-30
Attending: OBSTETRICS & GYNECOLOGY
Payer: COMMERCIAL

## 2019-01-30 VITALS
BODY MASS INDEX: 37.95 KG/M2 | WEIGHT: 200.81 LBS | SYSTOLIC BLOOD PRESSURE: 128 MMHG | DIASTOLIC BLOOD PRESSURE: 82 MMHG

## 2019-01-30 DIAGNOSIS — Z30.430 ENCOUNTER FOR IUD INSERTION: ICD-10-CM

## 2019-01-30 DIAGNOSIS — Z01.812 PRE-PROCEDURE LAB EXAM: Primary | ICD-10-CM

## 2019-01-30 LAB
B-HCG UR QL: NEGATIVE
CTP QC/QA: YES

## 2019-01-30 PROCEDURE — 58300 INSERT INTRAUTERINE DEVICE: CPT | Mod: S$GLB,,, | Performed by: OBSTETRICS & GYNECOLOGY

## 2019-01-30 PROCEDURE — 81025 URINE PREGNANCY TEST: CPT | Mod: S$GLB,,, | Performed by: OBSTETRICS & GYNECOLOGY

## 2019-01-30 PROCEDURE — 58300 INSERTION OF IUD: ICD-10-PCS | Mod: S$GLB,,, | Performed by: OBSTETRICS & GYNECOLOGY

## 2019-01-30 PROCEDURE — 81025 POCT URINE PREGNANCY: ICD-10-PCS | Mod: S$GLB,,, | Performed by: OBSTETRICS & GYNECOLOGY

## 2019-01-30 NOTE — PROCEDURES
Insertion of IUD  Date/Time: 1/30/2019 10:40 AM  Performed by: Duyen Haney MD  Authorized by: Duyen Haney MD     Consent:     Consent obtained:  Written    Consent given by:  Patient    Procedure risks and benefits discussed: yes      Patient questions answered: yes      Patient agrees, verbalizes understanding, and wants to proceed: yes      Educational handouts given: yes      Instructions and paperwork completed: yes    Procedure:     Pelvic exam performed: yes      Negative GC/chlamydia test: yes      Negative urine pregnancy test: yes      Negative serum pregnancy test: no      Cervix cleaned and prepped: yes      Speculum placed in vagina: yes      Tenaculum applied to cervix: no      Uterus sounded: yes      Uterus sound depth (cm):  7    IUD inserted with no complications: yes      IUD type:  Mirena    Strings trimmed: yes    Post-procedure:     Patient tolerated procedure well: yes      Patient will follow up after next period: yes    Comments:      Placed under ultrasound guidance, confirmed in place

## 2019-03-26 ENCOUNTER — HOSPITAL ENCOUNTER (EMERGENCY)
Facility: HOSPITAL | Age: 24
Discharge: HOME | End: 2019-03-26
Attending: EMERGENCY MEDICINE
Payer: COMMERCIAL

## 2019-03-26 VITALS
DIASTOLIC BLOOD PRESSURE: 73 MMHG | OXYGEN SATURATION: 99 % | TEMPERATURE: 99 F | WEIGHT: 195 LBS | HEART RATE: 92 BPM | HEIGHT: 61 IN | BODY MASS INDEX: 36.82 KG/M2 | SYSTOLIC BLOOD PRESSURE: 137 MMHG | RESPIRATION RATE: 18 BRPM

## 2019-03-26 DIAGNOSIS — S83.91XA SPRAIN OF RIGHT KNEE, UNSPECIFIED LIGAMENT, INITIAL ENCOUNTER: Primary | ICD-10-CM

## 2019-03-26 DIAGNOSIS — M25.569 ACUTE KNEE PAIN: ICD-10-CM

## 2019-03-26 LAB
B-HCG UR QL: NEGATIVE
CTP QC/QA: YES

## 2019-03-26 PROCEDURE — 99284 EMERGENCY DEPT VISIT MOD MDM: CPT | Mod: ,,, | Performed by: PHYSICIAN ASSISTANT

## 2019-03-26 PROCEDURE — 25000003 PHARM REV CODE 250: Performed by: PHYSICIAN ASSISTANT

## 2019-03-26 PROCEDURE — 81025 URINE PREGNANCY TEST: CPT | Performed by: PHYSICIAN ASSISTANT

## 2019-03-26 PROCEDURE — 99284 PR EMERGENCY DEPT VISIT,LEVEL IV: ICD-10-PCS | Mod: ,,, | Performed by: PHYSICIAN ASSISTANT

## 2019-03-26 PROCEDURE — 99283 EMERGENCY DEPT VISIT LOW MDM: CPT | Mod: 25

## 2019-03-26 RX ORDER — IBUPROFEN 600 MG/1
600 TABLET ORAL
Status: COMPLETED | OUTPATIENT
Start: 2019-03-26 | End: 2019-03-26

## 2019-03-26 RX ORDER — NAPROXEN 500 MG/1
500 TABLET ORAL 2 TIMES DAILY WITH MEALS
Qty: 7 TABLET | Refills: 0 | Status: SHIPPED | OUTPATIENT
Start: 2019-03-26 | End: 2019-04-18

## 2019-03-26 RX ADMIN — IBUPROFEN 600 MG: 600 TABLET, FILM COATED ORAL at 11:03

## 2019-03-26 NOTE — ED PROVIDER NOTES
"Encounter Date: 3/26/2019       History     Chief Complaint   Patient presents with    Knee Pain     r knee pain, got up and popped, 3 surg in past     22 yo F with a history of anxiety, depression presents to the ED with a chief complaint of knee pain.  Patient states that she was sitting on the ground  style" just PTA and when she stood up she heard a pop and felt a "sizzle" in her right knee.  Pt has had 3 previous meniscus surgeries on this knee. She does not feel like this is her meniscus. Pt has not been able to bear weight on the R leg. Reports some tingling in the knee and lower leg.  Pain is worse with any movement of the knee joint.  She has not attempted treatment prior to arrival.        Review of patient's allergies indicates:   Allergen Reactions    Latex, natural rubber Hives     Past Medical History:   Diagnosis Date    Anxiety     Depression     reported per pt    Rectal bleeding     rectal bleeding when trying to defacate - pt having surgery on May 8th, 2018    Right hand fracture     broke growth plate as a teen, no repair     Past Surgical History:   Procedure Laterality Date     SECTION N/A 2018    Performed by Duyen Haney MD at [facility], [place], operated by [facility] L&D    ENDOSCOPIC LASER ABLATION   2018    Dr. Aburto, TTTS    [place] TOTAL KNEE ARTHROPLASTY Bilateral     4 total:  3 right for meniscus tears, 1 left knee meniscus tear due to sports related injuries    TONSILLECTOMY N/A      Family History   Problem Relation Age of Onset    Breast cancer Maternal Grandmother     Cancer Maternal Grandmother     Stroke Maternal Grandfather     Hypertension Mother     Graves' disease Mother     No Known Problems Brother     Colon cancer Neg Hx     Diabetes Neg Hx     Ovarian cancer Neg Hx     Congenital heart disease Neg Hx     Pacemaker/defibrilator Neg Hx     Early death Neg Hx      Social History     Tobacco Use    Smoking status: Former Smoker    Smokeless tobacco: Never Used "   Substance Use Topics    Alcohol use: Yes     Comment: social     Drug use: No     Review of Systems   Constitutional: Negative for fever.   HENT: Negative for sore throat.    Respiratory: Negative for shortness of breath.    Cardiovascular: Negative for chest pain.   Gastrointestinal: Negative for nausea.   Genitourinary: Negative for dysuria.   Musculoskeletal: Positive for arthralgias (R knee). Negative for back pain.   Skin: Negative for rash.   Neurological: Positive for numbness (tingling). Negative for weakness.   Hematological: Does not bruise/bleed easily.       Physical Exam     Initial Vitals [03/26/19 1040]   BP Pulse Resp Temp SpO2   137/73 92 18 98.6 °F (37 °C) 99 %      MAP       --         Physical Exam    Nursing note and vitals reviewed.  Constitutional: She appears well-developed and well-nourished. She is not diaphoretic.  Non-toxic appearance. She does not appear ill. No distress.   HENT:   Head: Normocephalic and atraumatic.   Neck: Neck supple.   Cardiovascular: Normal rate and regular rhythm. Exam reveals no gallop and no friction rub.    No murmur heard.  Pulmonary/Chest: Effort normal and breath sounds normal. No accessory muscle usage. No tachypnea. No respiratory distress. She has no decreased breath sounds. She has no wheezes. She has no rhonchi. She has no rales.   Abdominal: She exhibits no distension.   Musculoskeletal:   TTP to the lateral and posterior R knee. No ecchymosis. Mild swelling noted. DP pulse intact. Sensation intact. Significant pain with range of motion of the knee.  No obvious joint laxity.   Neurological: She is alert.   Skin: No rash noted.   Psychiatric: She has a normal mood and affect. Her behavior is normal.         ED Course   Procedures  Labs Reviewed   POCT URINE PREGNANCY          Imaging Results          X-Ray Knee 3 View Right (Final result)  Result time 03/26/19 12:44:05    Final result by Mervin Amos Jr., MD (03/26/19 12:44:05)                  Impression:      No acute abnormality seen.  Correlation with clinical findings would be helpful as the lateral is overpenetrated.      Electronically signed by: Mervin Amos MD  Date:    03/26/2019  Time:    12:44             Narrative:    EXAMINATION:  XR KNEE 3 VIEW RIGHT    CLINICAL HISTORY:  Pain in unspecified knee    TECHNIQUE:  AP, lateral, and Merchant views of the right knee were performed.    COMPARISON:  None    FINDINGS:  Bones are fairly well mineralized.  Alignment appears satisfactory.  No convincing fracture.  Lateral is overpenetrated making evaluation of the anterior structures more difficult.                              X-Rays:   Independently Interpreted Readings:   Other Readings:  R knee: No acute fracture or dislocation. No bony lesions.     Medical Decision Making:   History:   Old Medical Records: I decided to obtain old medical records.  Differential Diagnosis:   My differential diagnosis includes but is not limited to:   ligamentous injury, meniscal tear, Muscle strain, dislocation, fracture   Independently Interpreted Test(s):   I have ordered and independently interpreted X-rays - see prior notes.  Clinical Tests:   Lab Tests: Ordered and Reviewed       APC / Resident Notes:   23-year-old female with previous right knee surgeries presents with acute right knee pain after hearing a pop when standing up from a crossed-legged sitting position today.  Physical exam as noted above.  Distal pulses intact.    Will obtain a knee x-ray, give NSAIDs and apply ice to the knee.  I suspect possible LCL injury. Doubt full dislocation or vascular injury. Will plan to place patient in a knee immobilizer.  Patient has crutches at home.  I will refer to orthopedics for re-evaluation. I have reviewed the patient's records and discussed this case with my supervising physician.            Attending Attestation:     Physician Attestation Statement for NP/PA:   I have conducted a face to face encounter with  this patient in addition to the NP/PA, due to NP/PA Request    Other NP/PA Attestation Additions:    History of Present Illness: 23-year-old female past medical history of multiple right knee meniscal surgeries presenting with sudden onset of sharp lateral right knee pain after standing from cross-legged seated position, able to bear limping weight.   Physical Exam: NAD, NCAT, A&Ox3, PERRL and EOMI, right knee mildly edematous comparative to left, mild tenderness to palpation over lateral infrapatellar, painful flexion and extension but no laxity, CSM intact distally with intact DP and PT.   Medical Decision Making: Likely ligamentous injury, mechanism of injury and exam not consistent with fracture but will obtain x-ray, no indication of knee dislocation with vascular compromise.    XR neg for fx, pt placed in knee brace for outpt f./u and MRI    Attending Note:  Physician Attestation Statement: I have personally seen and examined this patient. As the supervising MD I agree with the above history. As the supervising MD I agree with the above PE. As the supervising MD I agree with the above treatment, course, plan, and disposition.                       Clinical Impression:       ICD-10-CM ICD-9-CM   1. Sprain of right knee, unspecified ligament, initial encounter S83.91XA 844.9   2. Acute knee pain M25.569 719.46         Disposition:   Disposition: Discharged  Condition: Stable                        Nicky Bhatia PA-C  03/26/19 1655       Vera Gaines MD  04/01/19 0035

## 2019-03-26 NOTE — ED TRIAGE NOTES
"Stood up from sitting on floor and felt a "pop and sizzle" in right knee and is now not able to stand.   "

## 2019-03-26 NOTE — ED NOTES
Patient identifiers for Nina Montesinos 23 y.o. female checked and correct.  Chief Complaint   Patient presents with    Knee Pain     r knee pain, got up and popped, 3 surg in past     Past Medical History:   Diagnosis Date    Anxiety     Depression     reported per pt    Rectal bleeding     rectal bleeding when trying to defacate - pt having surgery on May 8th, 2018    Right hand fracture     broke growth plate as a teen, no repair     Allergies reported:   Review of patient's allergies indicates:   Allergen Reactions    Latex, natural rubber Hives         LOC: Patient is awake, alert, and aware of environment with an appropriate affect. Patient is oriented x 3 and speaking appropriately.  APPEARANCE: Patient resting comfortably and in no acute distress. Patient is clean and well groomed, patient's clothing is properly fastened.  SKIN: The skin is warm and dry.   MUSKULOSKELETAL: Patient has limited ROM to right knee, tenderness to lateral, top, posterior aspect  of right knee, Pulses intact.   RESPIRATORY: Airway is open and patent. Respirations are spontaneous and non-labored with normal effort and rate.

## 2019-03-27 ENCOUNTER — OFFICE VISIT (OUTPATIENT)
Dept: ORTHOPEDICS | Facility: CLINIC | Age: 24
End: 2019-03-27
Payer: COMMERCIAL

## 2019-03-27 ENCOUNTER — HOSPITAL ENCOUNTER (OUTPATIENT)
Dept: RADIOLOGY | Facility: HOSPITAL | Age: 24
Discharge: HOME | End: 2019-03-27
Attending: PHYSICIAN ASSISTANT
Payer: COMMERCIAL

## 2019-03-27 VITALS
SYSTOLIC BLOOD PRESSURE: 117 MMHG | BODY MASS INDEX: 36.84 KG/M2 | HEIGHT: 61 IN | HEART RATE: 70 BPM | DIASTOLIC BLOOD PRESSURE: 75 MMHG

## 2019-03-27 DIAGNOSIS — M25.361 INSTABILITY OF RIGHT KNEE JOINT: ICD-10-CM

## 2019-03-27 DIAGNOSIS — M25.561 PAIN AND SWELLING OF RIGHT KNEE: ICD-10-CM

## 2019-03-27 DIAGNOSIS — M25.461 PAIN AND SWELLING OF RIGHT KNEE: ICD-10-CM

## 2019-03-27 DIAGNOSIS — M25.461 PAIN AND SWELLING OF RIGHT KNEE: Primary | ICD-10-CM

## 2019-03-27 DIAGNOSIS — M25.561 PAIN AND SWELLING OF RIGHT KNEE: Primary | ICD-10-CM

## 2019-03-27 PROCEDURE — 73565 X-RAY EXAM OF KNEES: CPT | Mod: 26,,, | Performed by: RADIOLOGY

## 2019-03-27 PROCEDURE — 99203 PR OFFICE/OUTPT VISIT, NEW, LEVL III, 30-44 MIN: ICD-10-PCS | Mod: S$GLB,,, | Performed by: PHYSICIAN ASSISTANT

## 2019-03-27 PROCEDURE — 73565 XR KNEE AP STANDING BILATERAL: ICD-10-PCS | Mod: 26,,, | Performed by: RADIOLOGY

## 2019-03-27 PROCEDURE — 3008F PR BODY MASS INDEX (BMI) DOCUMENTED: ICD-10-PCS | Mod: CPTII,S$GLB,, | Performed by: PHYSICIAN ASSISTANT

## 2019-03-27 PROCEDURE — 99203 OFFICE O/P NEW LOW 30 MIN: CPT | Mod: S$GLB,,, | Performed by: PHYSICIAN ASSISTANT

## 2019-03-27 PROCEDURE — 99999 PR PBB SHADOW E&M-EST. PATIENT-LVL IV: ICD-10-PCS | Mod: PBBFAC,,, | Performed by: PHYSICIAN ASSISTANT

## 2019-03-27 PROCEDURE — 73565 X-RAY EXAM OF KNEES: CPT | Mod: TC

## 2019-03-27 PROCEDURE — 99999 PR PBB SHADOW E&M-EST. PATIENT-LVL IV: CPT | Mod: PBBFAC,,, | Performed by: PHYSICIAN ASSISTANT

## 2019-03-27 PROCEDURE — 3008F BODY MASS INDEX DOCD: CPT | Mod: CPTII,S$GLB,, | Performed by: PHYSICIAN ASSISTANT

## 2019-03-27 RX ORDER — HYDROCODONE BITARTRATE AND ACETAMINOPHEN 5; 325 MG/1; MG/1
1-2 TABLET ORAL EVERY 6 HOURS PRN
Qty: 20 TABLET | Refills: 0 | Status: SHIPPED | OUTPATIENT
Start: 2019-03-27 | End: 2019-06-05

## 2019-03-27 NOTE — LETTER
March 27, 2019      [facility]  [address], [address]  [facility] [address]  Phone: [phone]       Patient: Nina Montesinos   YOB: 1995  Date of Visit: 03/27/2019    To Whom It May Concern:    TERRANCE Montesinos  was at [facility] on 03/27/2019. She miss work on 3/26/19 due to  rt. knee injury. She may not return to work/school until after her next appointment 4/1/19.  If you have any questions or concerns, or if I can be of further assistance, please do not hesitate to contact me.    Sincerely,    Sridhar Peralta

## 2019-03-27 NOTE — PROGRESS NOTES
SUBJECTIVE:     Chief Complaint & History of Present Illness:  Nina Montesinos is a New patient 23 y.o. female who is seen here today with a complaint of  right knee pain .  Patient is here today for evaluation treatment of sudden onset pain in the anterior medial aspect of the right knee.  States she has sudden onset of pain when she was rising from a seated position on the floor felt a sharp popping sensation and deep seen soreness she has had difficulty with any type of range of motion or weight-bearing since the time of injury.  Was seen and treated the emergency room that day x-rays demonstrate no evidence of fracture dislocation.  Examination of tendon and ligamentous injury is difficult secondary to pain. Reports a positive history of having multiple arthroscopies to that knee for previous meniscal repairs.  Denies history of tendon or ligamentous damage to the right knee.   On a scale of 1-10, with 10 being worst pain imaginable, he rates this pain as 6 on good days and 10 on bad days.  she describes the pain as tender.    Review of patient's allergies indicates:   Allergen Reactions    Latex, natural rubber Hives         Current Outpatient Medications   Medication Sig Dispense Refill    naproxen (NAPROSYN) 500 MG tablet Take 1 tablet (500 mg total) by mouth 2 (two) times daily with meals. Take with food 7 tablet 0    HYDROcodone-acetaminophen (NORCO) 5-325 mg per tablet Take 1-2 tablets by mouth every 6 (six) hours as needed for Pain. 20 tablet 0     Current Facility-Administered Medications   Medication Dose Route Frequency Provider Last Rate Last Dose    levonorgestrel 20 mcg/24 hr (5 years) IUD 1 Intra Uterine Device  1 Intra Uterine Device Intrauterine  Duyen Haney MD   1 Intra Uterine Device at 01/30/19 1040       Past Medical History:   Diagnosis Date    Anxiety     Depression     reported per pt    Rectal bleeding     rectal bleeding when trying to defacate - pt having surgery on May  "2018    Right hand fracture     broke growth plate as a teen, no repair       Past Surgical History:   Procedure Laterality Date     SECTION N/A 2018    Performed [facility] at [facility]    ENDOSCOPIC LASER ABLATION   2018    Dr. Aburto, TTTS    [place] TOTAL KNEE ARTHROPLASTY Bilateral     4 total:  3 right for meniscus tears, 1 left knee meniscus tear due to sports related injuries    TONSILLECTOMY N/A        Vital Signs (Most Recent)  Vitals:    19 0836   BP: 117/75   Pulse: 70           Review of Systems:  ROS:  Constitutional: no fever or chills  Eyes: no visual changes  ENT: no nasal congestion or sore throat  Respiratory: no cough or shortness of breath  Cardiovascular: no chest pain or palpitations  Gastrointestinal: no nausea or vomiting, tolerating diet  Genitourinary: no hematuria or dysuria  Integument/Breast: no rash or pruritis  Hematologic/Lymphatic: no easy bruising or lymphadenopathy  Musculoskeletal: no arthralgias or myalgias  Neurological: no seizures or tremors, Positive for generalized anxiety disorder  Behavioral/Psych: no auditory or visual hallucinations  Endocrine: no heat or cold intolerance                OBJECTIVE:     PHYSICAL EXAM:  Height: 5' 1" (154.9 cm) Weight: (no wt. pt. have a brace on knee), General Appearance: Well nourished, well developed, in no acute distress.  Neurological: Mood & affect are normal.  right  Knee Exam:  Knee Range of Motion:0-15 degrees flexion  restricted secondary to pain  Effusion:yes and mild  Condition of skin:intact  Location of tenderness:Lateral joint line and Patellar tendon   Strength:limited by pain  Stability:  Unable to test for stability secondary to pain  Varus /Valgus stress:  normal  Monique:   negative/negative      Hip Examination:  normal    RADIOGRAPHS:  X-rays from emergency room visit and standing x-ray taken today films reviewed by me demonstrate no evidence of fracture dislocation or about the " knee joint spaces are well maintained    ASSESSMENT/PLAN:       ICD-10-CM ICD-9-CM   1. Pain and swelling of right knee M25.561 719.46    M25.461 719.06   2. Instability of right knee joint M25.361 718.86       Plan: We discussed with the patient at length all the different treatment options available for  the knee including anti-inflammatories, acetaminophen, rest, ice, knee strengthening exercise, occasional cortisone injections for temporary relief, Viscosupplimentation injections, arthroscopic debridement osteotomy, and finally knee arthroplasty.   The patient will remain in knee immobilizer weight-bearing as tolerated   MRI of the right knee  Hydrocodone/acetaminophen 5/325 mg 1-2 tablets Q 6 hr p.r.n. for pain  Follow-up after MRI sooner symptoms dictate  BMP was consulted for evidence of opioid abuse none was found

## 2019-03-27 NOTE — LETTER
March 27, 2019      Nicky Bhatia PA-C  [address]  [place] [address]           [facility]  [address]  [facility] [address]  Phone: [phone]          Patient: Nina Montesinos   MR Number: [id number]   YOB: 1995   Date of Visit: 3/27/2019       Dear Nicky Bhatia:    Thank you for referring Nina Montesinos to me for evaluation. Attached you will find relevant portions of my assessment and plan of care.    If you have questions, please do not hesitate to call me. I look forward to following Nina Montesinos along with you.    Sincerely,    Sridhar Peralta PA-C    Enclosure  CC:  No Recipients    If you would like to receive this communication electronically, please contact [email or web address] or [phone] to request more information on [employer] access.    For providers and/or their staff who would like to refer a patient to [facility], please contact us through our one-stop-shop provider referral line, [facility], at [phone].    If you feel you have received this communication in error or would no longer like to receive these types of communications, please e-mail [email or web address]

## 2019-03-30 ENCOUNTER — HOSPITAL ENCOUNTER (OUTPATIENT)
Dept: RADIOLOGY | Facility: HOSPITAL | Age: 24
Discharge: HOME | End: 2019-03-30
Attending: PHYSICIAN ASSISTANT
Payer: COMMERCIAL

## 2019-03-30 DIAGNOSIS — M25.461 PAIN AND SWELLING OF RIGHT KNEE: ICD-10-CM

## 2019-03-30 DIAGNOSIS — M25.561 PAIN AND SWELLING OF RIGHT KNEE: ICD-10-CM

## 2019-03-30 DIAGNOSIS — M25.361 INSTABILITY OF RIGHT KNEE JOINT: ICD-10-CM

## 2019-03-30 PROCEDURE — 73721 MRI KNEE WITHOUT CONTRAST RIGHT: ICD-10-PCS | Mod: 26,RT,, | Performed by: RADIOLOGY

## 2019-03-30 PROCEDURE — 73721 MRI JNT OF LWR EXTRE W/O DYE: CPT | Mod: TC,RT

## 2019-03-30 PROCEDURE — 73721 MRI JNT OF LWR EXTRE W/O DYE: CPT | Mod: 26,RT,, | Performed by: RADIOLOGY

## 2019-04-02 ENCOUNTER — OFFICE VISIT (OUTPATIENT)
Dept: SPORTS MEDICINE | Facility: CLINIC | Age: 24
End: 2019-04-02
Payer: COMMERCIAL

## 2019-04-02 ENCOUNTER — HOSPITAL ENCOUNTER (OUTPATIENT)
Dept: RADIOLOGY | Facility: HOSPITAL | Age: 24
Discharge: HOME | End: 2019-04-02
Attending: ORTHOPAEDIC SURGERY
Payer: COMMERCIAL

## 2019-04-02 VITALS
HEART RATE: 79 BPM | DIASTOLIC BLOOD PRESSURE: 67 MMHG | WEIGHT: 195 LBS | BODY MASS INDEX: 36.82 KG/M2 | SYSTOLIC BLOOD PRESSURE: 132 MMHG | HEIGHT: 61 IN

## 2019-04-02 DIAGNOSIS — M25.561 RIGHT KNEE PAIN, UNSPECIFIED CHRONICITY: ICD-10-CM

## 2019-04-02 DIAGNOSIS — S83.251A BUCKET-HANDLE TEAR OF LATERAL MENISCUS OF RIGHT KNEE AS CURRENT INJURY, INITIAL ENCOUNTER: Primary | ICD-10-CM

## 2019-04-02 PROCEDURE — 99999 PR PBB SHADOW E&M-EST. PATIENT-LVL III: ICD-10-PCS | Mod: PBBFAC,,, | Performed by: ORTHOPAEDIC SURGERY

## 2019-04-02 PROCEDURE — 77073 BONE LENGTH STUDIES: CPT | Mod: TC,FY,PO

## 2019-04-02 PROCEDURE — 77073 XR HIP TO ANKLE: ICD-10-PCS | Mod: 26,,, | Performed by: RADIOLOGY

## 2019-04-02 PROCEDURE — 3008F BODY MASS INDEX DOCD: CPT | Mod: CPTII,S$GLB,, | Performed by: ORTHOPAEDIC SURGERY

## 2019-04-02 PROCEDURE — 99999 PR PBB SHADOW E&M-EST. PATIENT-LVL III: CPT | Mod: PBBFAC,,, | Performed by: ORTHOPAEDIC SURGERY

## 2019-04-02 PROCEDURE — 99204 PR OFFICE/OUTPT VISIT, NEW, LEVL IV, 45-59 MIN: ICD-10-PCS | Mod: S$GLB,,, | Performed by: ORTHOPAEDIC SURGERY

## 2019-04-02 PROCEDURE — 77073 BONE LENGTH STUDIES: CPT | Mod: 26,,, | Performed by: RADIOLOGY

## 2019-04-02 PROCEDURE — 99204 OFFICE O/P NEW MOD 45 MIN: CPT | Mod: S$GLB,,, | Performed by: ORTHOPAEDIC SURGERY

## 2019-04-02 PROCEDURE — 3008F PR BODY MASS INDEX (BMI) DOCUMENTED: ICD-10-PCS | Mod: CPTII,S$GLB,, | Performed by: ORTHOPAEDIC SURGERY

## 2019-04-02 NOTE — PROGRESS NOTES
CC: Right knee pain    23 y.o. Female who presents as a new patient to me. She works as a . She states that 1 week ago she was sitting [place]ian style and went to stand up from this position and felt a pop in her knee with immediate pain. She noted swelling to the knee and difficulty bearing weight as well as difficulty with ROM. Notably she has a hx of 3 previous surgeries in this knee for a lateral meniscus tear and thinks she had at least 1 repair. Initial surgery dating back to around 2011. Most recent in 2015. I have reviewed her medical record which was faxed over and it does not appear she ever had a repair. She's had 2 prior partial lateral meniscectomies and 1 additional surgery for a symptomatic plica. She also has a hx of lateral meniscus tear of her L knee treated with surgery. She localizes the pain to the lateral aspect of the knee and states the knee has bothered her since her last surgery. She now has increased pain w/ attempts at weight bearing. Better with rest. Accompanied by her mother.    PMHx notable for recent pregnancy w/ premature delivery of twins who are still in need for advanced level care. BMI 36.8  Negative for tobacco.   Negative for diabetes.    Pain Score:   5    REVIEW OF SYSTEMS:   Constitution: Negative. Negative for chills, fever and night sweats.    Hematologic/Lymphatic: Negative for bleeding problem. Does not bruise/bleed easily.   Skin: Negative for dry skin, itching and rash.   Musculoskeletal: Negative for falls. Positive for right knee pain and  muscle weakness.     All other review of symptoms were reviewed and found to be noncontributory.     PAST MEDICAL HISTORY:   Past Medical History:   Diagnosis Date    Anxiety     Depression     reported per pt    Rectal bleeding     rectal bleeding when trying to defacate - pt having surgery on May 8th, 2018    Right hand fracture     broke growth plate as a teen, no repair       PAST SURGICAL HISTORY:   Past Surgical  History:   Procedure Laterality Date     SECTION N/A 2018    Per[facility] at [facility]    ENDOSCOPIC LASER ABLATION   2018    Dr. Aburto, [facility]    OH TOTAL KNEE ARTHROPLASTY Bilateral     4 total:  3 right for meniscus tears, 1 left knee meniscus tear due to sports related injuries    TONSILLECTOMY N/A        FAMILY HISTORY:   Family History   Problem Relation Age of Onset    Breast cancer Maternal Grandmother     Cancer Maternal Grandmother     Stroke Maternal Grandfather     Hypertension Mother     Graves' disease Mother     No Known Problems Brother     Colon cancer Neg Hx     Diabetes Neg Hx     Ovarian cancer Neg Hx     Congenital heart disease Neg Hx     Pacemaker/defibrilator Neg Hx     Early death Neg Hx        SOCIAL HISTORY:   Social History     Socioeconomic History    Marital status: Single     Spouse name: Not on file    Number of children: Not on file    Years of education: Not on file    Highest education level: Not on file   Occupational History    Not on file   Social Needs    Financial resource strain: Not on file    Food insecurity:     Worry: Not on file     Inability: Not on file    Transportation needs:     Medical: Not on file     Non-medical: Not on file   Tobacco Use    Smoking status: Former Smoker    Smokeless tobacco: Never Used   Substance and Sexual Activity    Alcohol use: Yes     Comment: social     Drug use: No    Sexual activity: Not Currently     Partners: Male     Birth control/protection: None   Lifestyle    Physical activity:     Days per week: Not on file     Minutes per session: Not on file    Stress: Not on file   Relationships    Social connections:     Talks on phone: Not on file     Gets together: Not on file     Attends Buddhism service: Not on file     Active member of club or organization: Not on file     Attends meetings of clubs or organizations: Not on file     Relationship status: Not on file   Other  "Topics Concern    Not on file   Social History Narrative    Not on file       MEDICATIONS:     Current Outpatient Medications:     aspirin (ECOTRIN) 81 MG EC tablet, Take 1 tablet twice a day with food starting after surgery (breakfast and dinner)., Disp: 28 tablet, Rfl: 0    HYDROcodone-acetaminophen (NORCO) 5-325 mg per tablet, Take 1-2 tablets by mouth every 6 (six) hours as needed for Pain., Disp: 20 tablet, Rfl: 0    naproxen (NAPROSYN) 500 MG tablet, Take 1 tablet (500 mg total) by mouth 2 (two) times daily with meals. Take with food, Disp: 7 tablet, Rfl: 0    ondansetron (ZOFRAN) 4 MG tablet, Take 1 tablet (4 mg total) by mouth every 8 (eight) hours as needed for Nausea., Disp: 30 tablet, Rfl: 0    oxyCODONE (ROXICODONE) 5 MG immediate release tablet, Take 1-2 tablets as needed for pain every 6 hours., Disp: 30 tablet, Rfl: 0    Current Facility-Administered Medications:     levonorgestrel 20 mcg/24 hr (5 years) IUD 1 Intra Uterine Device, 1 Intra Uterine Device, Intrauterine, , Duyen Haney MD, 1 Intra Uterine Device at 01/30/19 1040    ALLERGIES:   Review of patient's allergies indicates:   Allergen Reactions    Latex, natural rubber Hives        PHYSICAL EXAMINATION:  /67   Pulse 79   Ht 5' 1" (1.549 m)   Wt 88.5 kg (195 lb)   LMP 03/25/2019   BMI 36.84 kg/m²   General: Well-developed well-nourished 23 y.o. femalein no acute distress   Cardiovascular: Regular rhythm by palpation of distal pulse, normal color and temperature, no concerning varicosities on symptomatic side   Lungs: No labored breathing or wheezing appreciated   Neuro: Alert and oriented ×3   Psychiatric: well oriented to person, place and time, demonstrates normal mood and affect   Skin: No rashes, lesions or ulcers, normal temperature, turgor, and texture on involved extremity    Ortho/SPM Exam  Examination of the right knee demonstrates mild valgus alignment on standing. Previous arthroscopy incisions well healed. " "Moderate effusion. +TTP LJL. Limited ROM actively and passively to 0 deg extension to 90deg flexion w/ increased pain at terminal flexion. Stable to varus and valgus stress testing. Negative Lachman. Negative posterior drawer. + Monique for pain and mechanical symptoms laterally. No sig hyperlaxity.     IMAGING:    MRI R knee 3/30/19 reviewed by me and discussed with patient. Study shows:  Impression       1. Complex tear of lateral meniscus with anterior displacement of the posterior horn.  2. Superficial chondral fibrillation over the posterior weight-bearing lateral femoral condyle.  3. Moderate joint effusion.     Hip to Ankle XR 4/2/19:  approx 2 deg valgus on right over the lateral intercondylar spine, neutral 0 on left      ASSESSMENT:      ICD-10-CM ICD-9-CM   1. Bucket-handle tear of lateral meniscus of right knee as current injury, initial encounter S83.251A 836.1   2. Right knee pain, unspecified chronicity M25.561 719.46       PLAN:     -Findings and treatment options were discussed with the patient, both operative and non operative. This is a sig meniscal injury and surgery is recommended. She has some early secondary signs unfortunately of chronic LM deficiency to include valgus deformity which currently is small and < 3 degrees. There is a side to side difference however. In this case, I do not believe this warrants a DFO however, I have discussed that I view her knee as potentially "at risk" particularly in light of her obesity. She may have progressive degenerative changes down the road and I believe repair is theoretically at increased risk for LM tissue non-healing or retear. Risk can be as high as 40-50% with this type of tear. With failure, she'd may need MAT with DFO. Given the problems associated with complete lateral meniscectomy, I believe an attempt at repair is reasonable in this case. Consider postop valgus  brace given current deformity.and   -Plan for a Right knee lateral " meniscus repair (probable hybrid all inside and inside out) versus partial lateral meniscectomy. The details of such were discussed to include the expected postop rehab and recovery course.    -Planned DOS for  4/8/19.   -RTC for preoperative appointment   -All questions answered    Informed Consent:    The details of the surgical procedure were explained, including the location of probable incisions and a description of possible hardware and/or grafts to be used. We also discussed the potential benefit of Amniox tissue biologic augmentation with associated literature support, theoretical risks and benefits. Alternatives to both operative and non-operative options with associated risks and benefits were discussed. The patient understands the likely convalescence after surgery and, in particular, the expected postop rehab and recovery course. The outlined risks and potential complications of the proposed procedure include but are not limited to: infection, poor wound healing, scarring, deformity, stiffness, swelling, continued or recurrent pain, instability, hardware or prosthetic failure if implanted, symptomatic hardware requiring removal, weakness, neurovascular injury, numbness, chronic regional pain disorder, tissue nonhealing/irreparability/retear, subsequent contralateral limb injury or pathology, chondral injury, arthritis, fracture, blood clot formation, inability to return to previous level of activity, anesthetic or regional block complication up to death, need for additional procedure as indicated intraoperatively, and potential need for further surgery.    The patient was also informed and understands that the risks of surgery are greater for patients with a current condition or history of heart disease, obesity, clotting disorders, recurrent infections, steroid use, current or past smoking, and factors such as sedentary lifestyle and noncompliance with medications, therapy or follow-up. The degree of  the increased risk is hard to estimate with any degree of precision. If applicable, smoking cessation was discussed.     All questions were answered. The patient has verbalized understanding of these issues and wishes to proceed with the surgery as discussed.        Procedures

## 2019-04-02 NOTE — LETTER
April 7, 2019      Sridhar Peralta PA-C  [address]  [facility] [address]           [facility]  [address]  [facility] [address]  Phone: [phone]          Patient: Nina Montesinos   MR Number: [id number]   YOB: 1995   Date of Visit: 4/2/2019       Dear Sridhar Peralta:    Thank you for referring Nina Montesinos to me for evaluation. Attached you will find relevant portions of my assessment and plan of care.    If you have questions, please do not hesitate to call me. I look forward to following Nina Montesinos along with you.    Sincerely,    REENA Ortiz MD    Enclosure  CC:  No Recipients    If you would like to receive this communication electronically, please contact [email or web address] or [phone] to request more information on [employer] Link access.    For providers and/or their staff who would like to refer a patient to [facility], please contact us through our one-stop-shop provider referral line, A[facility], at [phone].    If you feel you have received this communication in error or would no longer like to receive these types of communications, please e-mail [email or web address]

## 2019-04-02 NOTE — LETTER
April 2, 2019      [facility]  [address]  [facility] [address]  Phone: [phone]       Patient: Nina Montesinos   YOB: 1995  Date of Visit: 04/02/2019    To Whom It May Concern:    TERRANCE Montesinos  was seen at [facility] on 04/02/2019 by Betty Ortiz MD.     Due to the injury of her right knee she is schedule for right knee surgery on 04/08/2019.   She is cleared to return back to work on April 5 -7 with the following restrictions:    Please allow her to keep her leg prop at all time or as needed.   Must wear Leg brace at all time.    Her 2 wk-post-op appointment is schedule for April 23, 2019 at 10:30am.  A follow up letter will be given at this appointment.     If you have any questions or concerns, or if I can be of further assistance, please do not hesitate to contact me.    Sincerely,    Gia Monteiro MA  Medical Assistant to Delonte Ortiz M.D

## 2019-04-03 ENCOUNTER — ANESTHESIA EVENT (OUTPATIENT)
Dept: SURGERY | Facility: OTHER | Age: 24
End: 2019-04-03
Payer: COMMERCIAL

## 2019-04-03 ENCOUNTER — HOSPITAL ENCOUNTER (OUTPATIENT)
Dept: PREADMISSION TESTING | Facility: OTHER | Age: 24
Discharge: HOME | End: 2019-04-03
Attending: ORTHOPAEDIC SURGERY
Payer: COMMERCIAL

## 2019-04-03 ENCOUNTER — OFFICE VISIT (OUTPATIENT)
Dept: SPORTS MEDICINE | Facility: CLINIC | Age: 24
End: 2019-04-03
Payer: COMMERCIAL

## 2019-04-03 VITALS
BODY MASS INDEX: 36.82 KG/M2 | SYSTOLIC BLOOD PRESSURE: 118 MMHG | HEART RATE: 90 BPM | WEIGHT: 195 LBS | HEIGHT: 61 IN | DIASTOLIC BLOOD PRESSURE: 82 MMHG

## 2019-04-03 VITALS
RESPIRATION RATE: 16 BRPM | BODY MASS INDEX: 36.82 KG/M2 | HEART RATE: 79 BPM | DIASTOLIC BLOOD PRESSURE: 66 MMHG | SYSTOLIC BLOOD PRESSURE: 119 MMHG | WEIGHT: 195 LBS | TEMPERATURE: 98 F | OXYGEN SATURATION: 97 % | HEIGHT: 61 IN

## 2019-04-03 DIAGNOSIS — S83.281A ACUTE LATERAL MENISCUS TEAR OF RIGHT KNEE, INITIAL ENCOUNTER: Primary | ICD-10-CM

## 2019-04-03 DIAGNOSIS — M25.561 RIGHT KNEE PAIN, UNSPECIFIED CHRONICITY: ICD-10-CM

## 2019-04-03 DIAGNOSIS — S83.251A BUCKET-HANDLE TEAR OF LATERAL MENISCUS OF RIGHT KNEE AS CURRENT INJURY, INITIAL ENCOUNTER: Primary | ICD-10-CM

## 2019-04-03 PROCEDURE — 99999 PR PBB SHADOW E&M-EST. PATIENT-LVL III: CPT | Mod: PBBFAC,,, | Performed by: PHYSICIAN ASSISTANT

## 2019-04-03 PROCEDURE — 99999 PR PBB SHADOW E&M-EST. PATIENT-LVL III: ICD-10-PCS | Mod: PBBFAC,,, | Performed by: PHYSICIAN ASSISTANT

## 2019-04-03 PROCEDURE — 99499 NO LOS: ICD-10-PCS | Mod: S$GLB,,, | Performed by: PHYSICIAN ASSISTANT

## 2019-04-03 PROCEDURE — 99499 UNLISTED E&M SERVICE: CPT | Mod: S$GLB,,, | Performed by: PHYSICIAN ASSISTANT

## 2019-04-03 RX ORDER — SODIUM CHLORIDE, SODIUM LACTATE, POTASSIUM CHLORIDE, CALCIUM CHLORIDE 600; 310; 30; 20 MG/100ML; MG/100ML; MG/100ML; MG/100ML
INJECTION, SOLUTION INTRAVENOUS CONTINUOUS
Status: CANCELLED | OUTPATIENT
Start: 2019-04-03

## 2019-04-03 RX ORDER — MUPIROCIN 20 MG/G
OINTMENT TOPICAL
Status: CANCELLED | OUTPATIENT
Start: 2019-04-03

## 2019-04-03 RX ORDER — OXYCODONE HYDROCHLORIDE 5 MG/1
TABLET ORAL
Qty: 30 TABLET | Refills: 0 | Status: SHIPPED | OUTPATIENT
Start: 2019-04-03 | End: 2019-06-05

## 2019-04-03 RX ORDER — LIDOCAINE HYDROCHLORIDE 10 MG/ML
0.5 INJECTION, SOLUTION EPIDURAL; INFILTRATION; INTRACAUDAL; PERINEURAL ONCE
Status: CANCELLED | OUTPATIENT
Start: 2019-04-03 | End: 2019-04-03

## 2019-04-03 RX ORDER — ASPIRIN 81 MG/1
TABLET ORAL
Qty: 28 TABLET | Refills: 0 | COMMUNITY
Start: 2019-04-03 | End: 2019-04-18

## 2019-04-03 RX ORDER — ONDANSETRON 4 MG/1
4 TABLET, FILM COATED ORAL EVERY 8 HOURS PRN
Qty: 30 TABLET | Refills: 0 | Status: SHIPPED | OUTPATIENT
Start: 2019-04-03 | End: 2019-04-18

## 2019-04-03 RX ORDER — SODIUM CHLORIDE 0.9 % (FLUSH) 0.9 %
10 SYRINGE (ML) INJECTION
Status: CANCELLED | OUTPATIENT
Start: 2019-04-03

## 2019-04-03 RX ORDER — PREGABALIN 75 MG/1
75 CAPSULE ORAL
Status: CANCELLED | OUTPATIENT
Start: 2019-04-03 | End: 2019-04-03

## 2019-04-03 RX ORDER — ACETAMINOPHEN 500 MG
1000 TABLET ORAL
Status: CANCELLED | OUTPATIENT
Start: 2019-04-03 | End: 2019-04-03

## 2019-04-03 NOTE — ANESTHESIA PREPROCEDURE EVALUATION
04/03/2019  Nina Montesinos is a 23 y.o., female.    Anesthesia Evaluation    I have reviewed the Patient Summary Reports.    I have reviewed the Nursing Notes.   I have reviewed the Medications.     Review of Systems  Anesthesia Hx:  Denies Family Hx of Anesthesia complications.   Denies Personal Hx of Anesthesia complications.   Social:  Former Smoker    Hematology/Oncology:     Oncology Normal     Cardiovascular:   Exercise tolerance: good    Pulmonary:  Pulmonary Normal    Renal/:  Renal/ Normal     Hepatic/GI:  Hepatic/GI Normal    Neurological:  Neurology Normal    Endocrine:  Endocrine Normal    Psych:   anxiety depression          Physical Exam  General:  Obesity    Airway/Jaw/Neck:  Airway Findings: Mouth Opening: Normal Tongue: Normal  General Airway Assessment: Adult  Mallampati: II      Dental:  Dental Findings: In tact        Mental Status:  Mental Status Findings:  Alert and Oriented, Cooperative         Anesthesia Plan  Type of Anesthesia, risks & benefits discussed:  Anesthesia Type:  general  Patient's Preference:   Intra-op Monitoring Plan: standard ASA monitors  Intra-op Monitoring Plan Comments:   Post Op Pain Control Plan: peripheral nerve block  Post Op Pain Control Plan Comments:   Induction:   IV  Beta Blocker:         Informed Consent:  Anesthesia consent signed with patient.  ASA Score: 2     Day of Surgery Review of History & Physical:    H&P update referred to the surgeon.     Anesthesia Plan Notes: No labs        Ready For Surgery From Anesthesia Perspective.

## 2019-04-03 NOTE — DISCHARGE INSTRUCTIONS
PRE-ADMIT TESTING -  [phone]    [address]  [place] [facility]          Your surgery has been scheduled at [facility]. We are pleased to have the opportunity to serve you. For Further Information please call [phone].    On the day of surgery please report to the Information Desk on the 1st floor.    · CONTACT YOUR PHYSICIAN'S OFFICE THE DAY PRIOR TO YOUR SURGERY TO OBTAIN YOUR ARRIVAL TIME.     · The evening before surgery do not eat anything after 9 p.m. ( this includes hard candy, chewing gum and mints).  You may only have GATORADE, POWERADE AND WATER  from 9 p.m. until you leave your home.   DO NOT DRINK ANY LIQUIDS ON THE WAY TO THE HOSPITAL.      SPECIAL MEDICATION INSTRUCTIONS: TAKE medications checked off by the Anesthesiologist on your Medication List.    Angiogram Patients: Take medications as instructed by your physician, including aspirin.     Surgery Patients:    If you take ASPIRIN - Your PHYSICIAN/SURGEON will need to inform you IF/OR when you need to stop taking aspirin prior to your surgery.     Do Not take any medications containing IBUPROFEN.  Do Not Wear any make-up or dark nail polish   (especially eye make-up) to surgery. If you come to surgery with makeup on you will be required to remove the makeup or nail polish.    Do not shave your surgical area at least 5 days prior to your surgery. The surgical prep will be performed at the hospital according to Infection Control regulations.    Leave all valuables at home.   Do Not wear any jewelry or watches, including any metal in body piercings. Jewelry must be removed prior to coming to the hospital.  There is a possibility that rings that are unable to be removed may be cut off if they are on the surgical extremity.    Contact Lens must be removed before surgery. Either do not wear the contact lens or bring a case and solution for storage.  Please bring a container for eyeglasses or dentures as required.  Bring  any paperwork your physician has provided, such as consent forms,  history and physicals, doctor's orders, etc.   Bring comfortable clothes that are loose fitting to wear upon discharge. Take into consideration the type of surgery being performed.  Maintain your diet as advised per your physician the day prior to surgery.      Adequate rest the night before surgery is advised.   Park in the Parking lot behind the hospital or in the [facility] across the street from the parking lot. Parking is complimentary.  If you will be discharged the same day as your procedure, please arrange for a responsible adult to drive you home or to accompany you if traveling by taxi.   YOU WILL NOT BE PERMITTED TO DRIVE OR TO LEAVE THE HOSPITAL ALONE AFTER SURGERY.   It is strongly recommended that you arrange for someone to remain with you for the first 24 hrs following your surgery.       Thank you for your cooperation.  The Staff of [facility].                Bathing Instructions with Hibiclens     Shower the evening before and morning of your procedure with Hibiclens:   Wash your face with water and your regular face wash/soap   Apply Hibiclens directly on your skin or on a wet washcloth and wash gently. When showering: Move away from the shower stream when applying Hibiclens to avoid rinsing off too soon.   Rinse thoroughly with warm water   Do not dilute Hibiclens         Dry off as usual, do not use any deodorant, powder, body lotions, perfume, after shave or cologne.

## 2019-04-03 NOTE — H&P
Nina Montesinos  is here for a completion of her perioperative paperwork. she  Is scheduled to undergo Right knee lateral meniscus repair (probable hybrid all inside and inside out)  on 2019.      She is a healthy individual and does not need clearance for this procedure.     Risks, indications and benefits of the surgical procedure were discussed with the patient. All questions with regard to surgery, rehab, expected return to functional activities, activities of daily living and recreational endeavors were answered to her satisfaction.    Patient was informed and understands the risks of surgery are greater for patients with a current condition or hx of heart disease, obesity, clotting disorders, recurrent infections, steroid use, current or past smoking, and factors such as sedentary lifestyle and noncompliance with medications, therapy or f/u. The degree of the increased risk is hard to estimate w/ any degree of precision.    Once no other questions were asked, a brief history and physical exam was then performed.    PAST MEDICAL HISTORY:   Past Medical History:   Diagnosis Date    Anxiety     Depression     reported per pt    Rectal bleeding     rectal bleeding when trying to defacate - pt having surgery on May 8th, 2018    Right hand fracture     broke growth plate as a teen, no repair     PAST SURGICAL HISTORY:   Past Surgical History:   Procedure Laterality Date     SECTION N/A 2018    Per[facility] D[facility] L[facility]D    ENDOSCOPIC LASER ABLATION   2018    Dr. Aburto, TTT[facility]    [place] TOTAL KNEE ARTHROPLASTY Bilateral     4 total:  3 right for meniscus tears, 1 left knee meniscus tear due to sports related injuries    TONSILLECTOMY N/A      FAMILY HISTORY:   Family History   Problem Relation Age of Onset    Breast cancer Maternal Grandmother     Cancer Maternal Grandmother     Stroke Maternal Grandfather     Hypertension Mother     Graves' disease Mother     No Known  Problems Brother     Colon cancer Neg Hx     Diabetes Neg Hx     Ovarian cancer Neg Hx     Congenital heart disease Neg Hx     Pacemaker/defibrilator Neg Hx     Early death Neg Hx      SOCIAL HISTORY:   Social History     Socioeconomic History    Marital status: Single     Spouse name: Not on file    Number of children: Not on file    Years of education: Not on file    Highest education level: Not on file   Occupational History    Not on file   Social Needs    Financial resource strain: Not on file    Food insecurity:     Worry: Not on file     Inability: Not on file    Transportation needs:     Medical: Not on file     Non-medical: Not on file   Tobacco Use    Smoking status: Former Smoker    Smokeless tobacco: Never Used   Substance and Sexual Activity    Alcohol use: Yes     Comment: social     Drug use: No    Sexual activity: Not Currently     Partners: Male     Birth control/protection: None   Lifestyle    Physical activity:     Days per week: Not on file     Minutes per session: Not on file    Stress: Not on file   Relationships    Social connections:     Talks on phone: Not on file     Gets together: Not on file     Attends [employer] service: Not on file     Active member of club or organization: Not on file     Attends meetings of clubs or organizations: Not on file     Relationship status: Not on file   Other Topics Concern    Not on file   Social History Narrative    Not on file       MEDICATIONS:   Current Outpatient Medications:     HYDROcodone-acetaminophen (NORCO) 5-325 mg per tablet, Take 1-2 tablets by mouth every 6 (six) hours as needed for Pain., Disp: 20 tablet, Rfl: 0    naproxen (NAPROSYN) 500 MG tablet, Take 1 tablet (500 mg total) by mouth 2 (two) times daily with meals. Take with food, Disp: 7 tablet, Rfl: 0    Current Facility-Administered Medications:     levonorgestrel 20 mcg/24 hr (5 years) IUD 1 Intra Uterine Device, 1 Intra Uterine Device, Intrauterine, ,  Duyen Haney MD, 1 Intra Uterine Device at 01/30/19 1040  ALLERGIES:   Review of patient's allergies indicates:   Allergen Reactions    Latex, natural rubber Hives       Review of Systems   Constitution: Negative. Negative for chills, fever and night sweats.   HENT: Negative for congestion and headaches.    Eyes: Negative for blurred vision, left vision loss and right vision loss.   Cardiovascular: Negative for chest pain and syncope.   Respiratory: Negative for cough and shortness of breath.    Endocrine: Negative for polydipsia, polyphagia and polyuria.   Hematologic/Lymphatic: Negative for bleeding problem. Does not bruise/bleed easily.   Skin: Negative for dry skin, itching and rash.   Musculoskeletal: Negative for falls and muscle weakness.   Gastrointestinal: Negative for abdominal pain and bowel incontinence.   Genitourinary: Negative for bladder incontinence and nocturia.   Neurological: Negative for disturbances in coordination, loss of balance and seizures.   Psychiatric/Behavioral: Negative for depression. The patient does not have insomnia.    Allergic/Immunologic: Negative for hives and persistent infections.     PHYSICAL EXAM:  GEN: A&Ox3, WD WN NAD  HEENT: WNL  CHEST: CTAB, no W/R/R  HEART: RRR, no M/R/G   ABD: Soft, NT ND, BS x4 QUADS  MS: Refer to previous note for detailed MS exam  NEURO: CN II-XII intact       The surgical consent was then reviewed with the patient, who agreed with all the contents of the consent form and it was signed.     PHYSICAL THERAPY:  She was also instructed regarding physical therapy and will begin on POD#1-3. She is doing physical therapy at [facility].    POST OP CARE: Instructions were reviewed including care of the wound and dressing after surgery and when she can shower.     PAIN MANAGEMENT: Nina Montesinos was instructed regarding the Polar ice unit that will be in place after surgery and her postoperative pain medications.      MEDICATION:  Roxicodone 5 mg 1-2 q 4 hours PRN for pain  Zofran 4 mg q 8 hours PRN for nausea and vomiting.  Aspirin 81mg BID x 2 weeks for DVT prophylaxis starting on the evening after surgery.    Patient was instructed to purchase and take Colace to counter possible GI side effects of taking opiates.     DVT prophylaxis was discussed with the patient today including risk factors for developing DVTs and history of DVTs. The patient was asked if any specific recommendations were given from the doctor/s that did pre-operative surgical clearance.      If the patient was previously taking 81mg baby aspirin, they were told to not take additional baby aspirin, using the above stated aspirin and to restart the 81mg aspirin daily after completion of the aspirin dose.      Patient was also told to buy over the counter Prilosec medication and take it once daily for GI protection as long as they are taking NSAIDs or Aspirin.     The patient was told that narcotic pain medications may make them drowsy and instructions were given to not sign legal documents, drive or operate heavy machinery, cars, or equipment while under the influence of narcotic medications.     Dr. Ortiz was present in clinic during this pre-op evaluation. The patient was offered the opportunity to ask Dr. Ortiz any further questions regarding the procedure which may not have been addressed during their previous informed consent discussion. The patient has declined to see Dr. Ortiz today.    As there were no other questions to be asked, she was given my business card along with Dr. Ortiz's business card if she has any questions or concerns prior to surgery or in the postop period.

## 2019-04-03 NOTE — H&P (VIEW-ONLY)
Nian Montesinos  is here for a completion of her perioperative paperwork. she  Is scheduled to undergo Right knee lateral meniscus repair (probable hybrid all inside and inside out)  on 2019.      She is a healthy individual and does not need clearance for this procedure.     Risks, indications and benefits of the surgical procedure were discussed with the patient. All questions with regard to surgery, rehab, expected return to functional activities, activities of daily living and recreational endeavors were answered to her satisfaction.    Patient was informed and understands the risks of surgery are greater for patients with a current condition or hx of heart disease, obesity, clotting disorders, recurrent infections, steroid use, current or past smoking, and factors such as sedentary lifestyle and noncompliance with medications, therapy or f/u. The degree of the increased risk is hard to estimate w/ any degree of precision.    Once no other questions were asked, a brief history and physical exam was then performed.    PAST MEDICAL HISTORY:   Past Medical History:   Diagnosis Date    Anxiety     Depression     reported per pt    Rectal bleeding     rectal bleeding when trying to defacate - pt having surgery on May 8th, 2018    Right hand fracture     broke growth plate as a teen, no repair     PAST SURGICAL HISTORY:   Past Surgical History:   Procedure Laterality Date     SECTION N/A 2018    [facility]    ENDOSCOPIC LASER ABLATION   2018    Dr. Aburto, [facility]TT[facility]    [place] TOTAL KNEE ARTHROPLASTY Bilateral     4 total:  3 right for meniscus tears, 1 left knee meniscus tear due to sports related injuries    TONSILLECTOMY N/A      FAMILY HISTORY:   Family History   Problem Relation Age of Onset    Breast cancer Maternal Grandmother     Cancer Maternal Grandmother     Stroke Maternal Grandfather     Hypertension Mother     Graves' disease Mother     No Known  Problems Brother     Colon cancer Neg Hx     Diabetes Neg Hx     Ovarian cancer Neg Hx     Congenital heart disease Neg Hx     Pacemaker/defibrilator Neg Hx     Early death Neg Hx      SOCIAL HISTORY:   Social History     Socioeconomic History    Marital status: Single     Spouse name: Not on file    Number of children: Not on file    Years of education: Not on file    Highest education level: Not on file   Occupational History    Not on file   Social Needs    Financial resource strain: Not on file    Food insecurity:     Worry: Not on file     Inability: Not on file    Transportation needs:     Medical: Not on file     Non-medical: Not on file   Tobacco Use    Smoking status: Former Smoker    Smokeless tobacco: Never Used   Substance and Sexual Activity    Alcohol use: Yes     Comment: social     Drug use: No    Sexual activity: Not Currently     Partners: Male     Birth control/protection: None   Lifestyle    Physical activity:     Days per week: Not on file     Minutes per session: Not on file    Stress: Not on file   Relationships    Social connections:     Talks on phone: Not on file     Gets together: Not on file     Attends [employer] service: Not on file     Active member of club or organization: Not on file     Attends meetings of clubs or organizations: Not on file     Relationship status: Not on file   Other Topics Concern    Not on file   Social History Narrative    Not on file       MEDICATIONS:   Current Outpatient Medications:     HYDROcodone-acetaminophen (NORCO) 5-325 mg per tablet, Take 1-2 tablets by mouth every 6 (six) hours as needed for Pain., Disp: 20 tablet, Rfl: 0    naproxen (NAPROSYN) 500 MG tablet, Take 1 tablet (500 mg total) by mouth 2 (two) times daily with meals. Take with food, Disp: 7 tablet, Rfl: 0    Current Facility-Administered Medications:     levonorgestrel 20 mcg/24 hr (5 years) IUD 1 Intra Uterine Device, 1 Intra Uterine Device, Intrauterine, ,  Dyuen Haney MD, 1 Intra Uterine Device at 01/30/19 1040  ALLERGIES:   Review of patient's allergies indicates:   Allergen Reactions    Latex, natural rubber Hives       Review of Systems   Constitution: Negative. Negative for chills, fever and night sweats.   HENT: Negative for congestion and headaches.    Eyes: Negative for blurred vision, left vision loss and right vision loss.   Cardiovascular: Negative for chest pain and syncope.   Respiratory: Negative for cough and shortness of breath.    Endocrine: Negative for polydipsia, polyphagia and polyuria.   Hematologic/Lymphatic: Negative for bleeding problem. Does not bruise/bleed easily.   Skin: Negative for dry skin, itching and rash.   Musculoskeletal: Negative for falls and muscle weakness.   Gastrointestinal: Negative for abdominal pain and bowel incontinence.   Genitourinary: Negative for bladder incontinence and nocturia.   Neurological: Negative for disturbances in coordination, loss of balance and seizures.   Psychiatric/Behavioral: Negative for depression. The patient does not have insomnia.    Allergic/Immunologic: Negative for hives and persistent infections.     PHYSICAL EXAM:  GEN: A&Ox3, WD WN NAD  HEENT: WNL  CHEST: CTAB, no W/R/R  HEART: RRR, no M/R/G   ABD: Soft, NT ND, BS x4 QUADS  MS: Refer to previous note for detailed MS exam  NEURO: CN II-XII intact       The surgical consent was then reviewed with the patient, who agreed with all the contents of the consent form and it was signed.     PHYSICAL THERAPY:  She was also instructed regarding physical therapy and will begin on POD#1-3. She is doing physical therapy at [facility].    POST OP CARE: Instructions were reviewed including care of the wound and dressing after surgery and when she can shower.     PAIN MANAGEMENT: Nina Montesinos was instructed regarding the Polar ice unit that will be in place after surgery and her postoperative pain medications.      MEDICATION:  Roxicodone 5 mg 1-2 q 4 hours PRN for pain  Zofran 4 mg q 8 hours PRN for nausea and vomiting.  Aspirin 81mg BID x 2 weeks for DVT prophylaxis starting on the evening after surgery.    Patient was instructed to purchase and take Colace to counter possible GI side effects of taking opiates.     DVT prophylaxis was discussed with the patient today including risk factors for developing DVTs and history of DVTs. The patient was asked if any specific recommendations were given from the doctor/s that did pre-operative surgical clearance.      If the patient was previously taking 81mg baby aspirin, they were told to not take additional baby aspirin, using the above stated aspirin and to restart the 81mg aspirin daily after completion of the aspirin dose.      Patient was also told to buy over the counter Prilosec medication and take it once daily for GI protection as long as they are taking NSAIDs or Aspirin.     The patient was told that narcotic pain medications may make them drowsy and instructions were given to not sign legal documents, drive or operate heavy machinery, cars, or equipment while under the influence of narcotic medications.     Dr. Ortiz was present in clinic during this pre-op evaluation. The patient was offered the opportunity to ask Dr. Ortiz any further questions regarding the procedure which may not have been addressed during their previous informed consent discussion. The patient has declined to see Dr. Ortiz today.    As there were no other questions to be asked, she was given my business card along with Dr. Ortiz's business card if she has any questions or concerns prior to surgery or in the postop period.

## 2019-04-08 ENCOUNTER — ANESTHESIA (OUTPATIENT)
Dept: SURGERY | Facility: OTHER | Age: 24
End: 2019-04-08
Payer: COMMERCIAL

## 2019-04-08 ENCOUNTER — HOSPITAL ENCOUNTER (OUTPATIENT)
Facility: OTHER | Age: 24
Discharge: HOME | End: 2019-04-08
Attending: ORTHOPAEDIC SURGERY | Admitting: ORTHOPAEDIC SURGERY
Payer: COMMERCIAL

## 2019-04-08 VITALS
TEMPERATURE: 99 F | OXYGEN SATURATION: 97 % | HEIGHT: 61 IN | RESPIRATION RATE: 16 BRPM | HEART RATE: 101 BPM | WEIGHT: 195 LBS | SYSTOLIC BLOOD PRESSURE: 134 MMHG | BODY MASS INDEX: 36.82 KG/M2 | DIASTOLIC BLOOD PRESSURE: 81 MMHG

## 2019-04-08 DIAGNOSIS — M25.561 RIGHT KNEE PAIN, UNSPECIFIED CHRONICITY: ICD-10-CM

## 2019-04-08 DIAGNOSIS — S83.251A BUCKET-HANDLE TEAR OF LATERAL MENISCUS OF RIGHT KNEE AS CURRENT INJURY, INITIAL ENCOUNTER: ICD-10-CM

## 2019-04-08 LAB
B-HCG UR QL: NEGATIVE
CTP QC/QA: YES

## 2019-04-08 PROCEDURE — 36000710: Performed by: ORTHOPAEDIC SURGERY

## 2019-04-08 PROCEDURE — 29882 PR KNEE SCOPE,MED OR LAT MENIS REPAIR: ICD-10-PCS | Mod: 22,RT,, | Performed by: ORTHOPAEDIC SURGERY

## 2019-04-08 PROCEDURE — 63600175 PHARM REV CODE 636 W HCPCS: Performed by: NURSE ANESTHETIST, CERTIFIED REGISTERED

## 2019-04-08 PROCEDURE — 71000015 HC POSTOP RECOV 1ST HR: Performed by: ORTHOPAEDIC SURGERY

## 2019-04-08 PROCEDURE — 81025 URINE PREGNANCY TEST: CPT | Performed by: PHYSICIAN ASSISTANT

## 2019-04-08 PROCEDURE — 25000003 PHARM REV CODE 250: Performed by: ANESTHESIOLOGY

## 2019-04-08 PROCEDURE — 63600175 PHARM REV CODE 636 W HCPCS: Performed by: ANESTHESIOLOGY

## 2019-04-08 PROCEDURE — 63600175 PHARM REV CODE 636 W HCPCS: Performed by: ORTHOPAEDIC SURGERY

## 2019-04-08 PROCEDURE — 71000016 HC POSTOP RECOV ADDL HR: Performed by: ORTHOPAEDIC SURGERY

## 2019-04-08 PROCEDURE — 63600175 PHARM REV CODE 636 W HCPCS

## 2019-04-08 PROCEDURE — 29882 ARTHRS KNE SRG MNISC RPR M/L: CPT | Mod: 22,RT,, | Performed by: ORTHOPAEDIC SURGERY

## 2019-04-08 PROCEDURE — 37000009 HC ANESTHESIA EA ADD 15 MINS: Performed by: ORTHOPAEDIC SURGERY

## 2019-04-08 PROCEDURE — 27201423 OPTIME MED/SURG SUP & DEVICES STERILE SUPPLY: Performed by: ORTHOPAEDIC SURGERY

## 2019-04-08 PROCEDURE — C1713 ANCHOR/SCREW BN/BN,TIS/BN: HCPCS | Performed by: ORTHOPAEDIC SURGERY

## 2019-04-08 PROCEDURE — 25000003 PHARM REV CODE 250: Performed by: PHYSICIAN ASSISTANT

## 2019-04-08 PROCEDURE — 25000003 PHARM REV CODE 250: Performed by: NURSE ANESTHETIST, CERTIFIED REGISTERED

## 2019-04-08 PROCEDURE — 71000033 HC RECOVERY, INTIAL HOUR: Performed by: ORTHOPAEDIC SURGERY

## 2019-04-08 PROCEDURE — 71000039 HC RECOVERY, EACH ADD'L HOUR: Performed by: ORTHOPAEDIC SURGERY

## 2019-04-08 PROCEDURE — 37000008 HC ANESTHESIA 1ST 15 MINUTES: Performed by: ORTHOPAEDIC SURGERY

## 2019-04-08 PROCEDURE — 63600175 PHARM REV CODE 636 W HCPCS: Performed by: PHYSICIAN ASSISTANT

## 2019-04-08 PROCEDURE — 36000711: Performed by: ORTHOPAEDIC SURGERY

## 2019-04-08 DEVICE — SYS NDL DELIVERY FAST FIX 360: Type: IMPLANTABLE DEVICE | Site: KNEE | Status: FUNCTIONAL

## 2019-04-08 RX ORDER — ONDANSETRON 2 MG/ML
4 INJECTION INTRAMUSCULAR; INTRAVENOUS DAILY PRN
Status: DISCONTINUED | OUTPATIENT
Start: 2019-04-08 | End: 2019-04-08 | Stop reason: HOSPADM

## 2019-04-08 RX ORDER — LIDOCAINE HYDROCHLORIDE 10 MG/ML
0.5 INJECTION, SOLUTION EPIDURAL; INFILTRATION; INTRACAUDAL; PERINEURAL ONCE
Status: DISCONTINUED | OUTPATIENT
Start: 2019-04-08 | End: 2019-04-08 | Stop reason: HOSPADM

## 2019-04-08 RX ORDER — KETOROLAC TROMETHAMINE 30 MG/ML
INJECTION, SOLUTION INTRAMUSCULAR; INTRAVENOUS
Status: DISCONTINUED | OUTPATIENT
Start: 2019-04-08 | End: 2019-04-08

## 2019-04-08 RX ORDER — HYDROMORPHONE HYDROCHLORIDE 2 MG/ML
INJECTION, SOLUTION INTRAMUSCULAR; INTRAVENOUS; SUBCUTANEOUS
Status: COMPLETED
Start: 2019-04-08 | End: 2019-04-08

## 2019-04-08 RX ORDER — SODIUM CHLORIDE 0.9 % (FLUSH) 0.9 %
3 SYRINGE (ML) INJECTION
Status: DISCONTINUED | OUTPATIENT
Start: 2019-04-08 | End: 2019-04-08 | Stop reason: HOSPADM

## 2019-04-08 RX ORDER — FENTANYL CITRATE 50 UG/ML
100 INJECTION, SOLUTION INTRAMUSCULAR; INTRAVENOUS EVERY 5 MIN PRN
Status: COMPLETED | OUTPATIENT
Start: 2019-04-08 | End: 2019-04-08

## 2019-04-08 RX ORDER — PREGABALIN 75 MG/1
75 CAPSULE ORAL
Status: DISCONTINUED | OUTPATIENT
Start: 2019-04-08 | End: 2019-04-08 | Stop reason: HOSPADM

## 2019-04-08 RX ORDER — HYDROMORPHONE HYDROCHLORIDE 2 MG/ML
INJECTION, SOLUTION INTRAMUSCULAR; INTRAVENOUS; SUBCUTANEOUS
Status: DISCONTINUED | OUTPATIENT
Start: 2019-04-08 | End: 2019-04-08

## 2019-04-08 RX ORDER — SODIUM CHLORIDE, SODIUM LACTATE, POTASSIUM CHLORIDE, CALCIUM CHLORIDE 600; 310; 30; 20 MG/100ML; MG/100ML; MG/100ML; MG/100ML
INJECTION, SOLUTION INTRAVENOUS CONTINUOUS
Status: DISCONTINUED | OUTPATIENT
Start: 2019-04-08 | End: 2019-04-08 | Stop reason: HOSPADM

## 2019-04-08 RX ORDER — MIDAZOLAM HYDROCHLORIDE 1 MG/ML
2 INJECTION INTRAMUSCULAR; INTRAVENOUS
Status: COMPLETED | OUTPATIENT
Start: 2019-04-08 | End: 2019-04-08

## 2019-04-08 RX ORDER — ACETAMINOPHEN 500 MG
1000 TABLET ORAL
Status: COMPLETED | OUTPATIENT
Start: 2019-04-08 | End: 2019-04-08

## 2019-04-08 RX ORDER — ONDANSETRON 2 MG/ML
INJECTION INTRAMUSCULAR; INTRAVENOUS
Status: DISCONTINUED | OUTPATIENT
Start: 2019-04-08 | End: 2019-04-08

## 2019-04-08 RX ORDER — ROPIVACAINE HYDROCHLORIDE 5 MG/ML
INJECTION, SOLUTION EPIDURAL; INFILTRATION; PERINEURAL
Status: COMPLETED | OUTPATIENT
Start: 2019-04-08 | End: 2019-04-08

## 2019-04-08 RX ORDER — CEFAZOLIN SODIUM 1 G/3ML
2 INJECTION, POWDER, FOR SOLUTION INTRAMUSCULAR; INTRAVENOUS
Status: DISCONTINUED | OUTPATIENT
Start: 2019-04-08 | End: 2019-04-08 | Stop reason: HOSPADM

## 2019-04-08 RX ORDER — DEXAMETHASONE SODIUM PHOSPHATE 4 MG/ML
INJECTION, SOLUTION INTRA-ARTICULAR; INTRALESIONAL; INTRAMUSCULAR; INTRAVENOUS; SOFT TISSUE
Status: DISCONTINUED | OUTPATIENT
Start: 2019-04-08 | End: 2019-04-08

## 2019-04-08 RX ORDER — SODIUM CHLORIDE 0.9 % (FLUSH) 0.9 %
10 SYRINGE (ML) INJECTION
Status: DISCONTINUED | OUTPATIENT
Start: 2019-04-08 | End: 2019-04-08 | Stop reason: HOSPADM

## 2019-04-08 RX ORDER — PROPOFOL 10 MG/ML
VIAL (ML) INTRAVENOUS
Status: DISCONTINUED | OUTPATIENT
Start: 2019-04-08 | End: 2019-04-08

## 2019-04-08 RX ORDER — OXYCODONE HYDROCHLORIDE 5 MG/1
5 TABLET ORAL
Status: DISCONTINUED | OUTPATIENT
Start: 2019-04-08 | End: 2019-04-08 | Stop reason: HOSPADM

## 2019-04-08 RX ORDER — OXYCODONE HYDROCHLORIDE 5 MG/1
5 TABLET ORAL ONCE
Status: COMPLETED | OUTPATIENT
Start: 2019-04-08 | End: 2019-04-08

## 2019-04-08 RX ORDER — LIDOCAINE HCL/PF 100 MG/5ML
SYRINGE (ML) INTRAVENOUS
Status: DISCONTINUED | OUTPATIENT
Start: 2019-04-08 | End: 2019-04-08

## 2019-04-08 RX ORDER — EPINEPHRINE 1 MG/ML
INJECTION, SOLUTION INTRACARDIAC; INTRAMUSCULAR; INTRAVENOUS; SUBCUTANEOUS
Status: DISCONTINUED | OUTPATIENT
Start: 2019-04-08 | End: 2019-04-08 | Stop reason: HOSPADM

## 2019-04-08 RX ORDER — HYDROMORPHONE HYDROCHLORIDE 2 MG/ML
0.4 INJECTION, SOLUTION INTRAMUSCULAR; INTRAVENOUS; SUBCUTANEOUS EVERY 5 MIN PRN
Status: DISCONTINUED | OUTPATIENT
Start: 2019-04-08 | End: 2019-04-08 | Stop reason: HOSPADM

## 2019-04-08 RX ORDER — MEPERIDINE HYDROCHLORIDE 25 MG/ML
12.5 INJECTION INTRAMUSCULAR; INTRAVENOUS; SUBCUTANEOUS ONCE AS NEEDED
Status: COMPLETED | OUTPATIENT
Start: 2019-04-08 | End: 2019-04-08

## 2019-04-08 RX ORDER — MUPIROCIN 20 MG/G
OINTMENT TOPICAL
Status: DISCONTINUED | OUTPATIENT
Start: 2019-04-08 | End: 2019-04-08 | Stop reason: HOSPADM

## 2019-04-08 RX ADMIN — ACETAMINOPHEN 1000 MG: 500 TABLET ORAL at 08:04

## 2019-04-08 RX ADMIN — MUPIROCIN: 20 OINTMENT TOPICAL at 08:04

## 2019-04-08 RX ADMIN — MIDAZOLAM HYDROCHLORIDE 2 MG: 1 INJECTION, SOLUTION INTRAMUSCULAR; INTRAVENOUS at 09:04

## 2019-04-08 RX ADMIN — PROPOFOL 200 MG: 10 INJECTION, EMULSION INTRAVENOUS at 10:04

## 2019-04-08 RX ADMIN — HYDROMORPHONE HYDROCHLORIDE 0.4 MG: 2 INJECTION INTRAMUSCULAR; INTRAVENOUS; SUBCUTANEOUS at 01:04

## 2019-04-08 RX ADMIN — SODIUM CHLORIDE, SODIUM LACTATE, POTASSIUM CHLORIDE, AND CALCIUM CHLORIDE: 600; 310; 30; 20 INJECTION, SOLUTION INTRAVENOUS at 09:04

## 2019-04-08 RX ADMIN — FENTANYL CITRATE 100 MCG: 50 INJECTION, SOLUTION INTRAMUSCULAR; INTRAVENOUS at 10:04

## 2019-04-08 RX ADMIN — ONDANSETRON 4 MG: 2 INJECTION INTRAMUSCULAR; INTRAVENOUS at 01:04

## 2019-04-08 RX ADMIN — MEPERIDINE HYDROCHLORIDE 12.5 MG: 25 INJECTION INTRAMUSCULAR; INTRAVENOUS; SUBCUTANEOUS at 01:04

## 2019-04-08 RX ADMIN — SODIUM CHLORIDE, SODIUM LACTATE, POTASSIUM CHLORIDE, AND CALCIUM CHLORIDE: 600; 310; 30; 20 INJECTION, SOLUTION INTRAVENOUS at 10:04

## 2019-04-08 RX ADMIN — OXYCODONE HYDROCHLORIDE 5 MG: 5 TABLET ORAL at 02:04

## 2019-04-08 RX ADMIN — DEXAMETHASONE SODIUM PHOSPHATE 8 MG: 4 INJECTION, SOLUTION INTRAMUSCULAR; INTRAVENOUS at 10:04

## 2019-04-08 RX ADMIN — LIDOCAINE HYDROCHLORIDE 50 MG: 20 INJECTION, SOLUTION INTRAVENOUS at 10:04

## 2019-04-08 RX ADMIN — OXYCODONE HYDROCHLORIDE 5 MG: 5 TABLET ORAL at 01:04

## 2019-04-08 RX ADMIN — KETOROLAC TROMETHAMINE 30 MG: 30 INJECTION, SOLUTION INTRAMUSCULAR; INTRAVENOUS at 01:04

## 2019-04-08 RX ADMIN — HYDROMORPHONE HYDROCHLORIDE 0.5 MG: 2 INJECTION, SOLUTION INTRAMUSCULAR; INTRAVENOUS; SUBCUTANEOUS at 11:04

## 2019-04-08 RX ADMIN — CARBOXYMETHYLCELLULOSE SODIUM 2 DROP: 2.5 SOLUTION/ DROPS OPHTHALMIC at 10:04

## 2019-04-08 RX ADMIN — FENTANYL CITRATE 100 MCG: 50 INJECTION, SOLUTION INTRAMUSCULAR; INTRAVENOUS at 09:04

## 2019-04-08 RX ADMIN — ROPIVACAINE HYDROCHLORIDE 30 ML: 5 INJECTION, SOLUTION EPIDURAL; INFILTRATION; PERINEURAL at 09:04

## 2019-04-08 RX ADMIN — CEFAZOLIN 2 G: 330 INJECTION, POWDER, FOR SOLUTION INTRAMUSCULAR; INTRAVENOUS at 10:04

## 2019-04-08 NOTE — TRANSFER OF CARE
"Anesthesia Transfer of Care Note    Patient: Nina Montesinos    Procedure(s) Performed: Procedure(s) (LRB):  ARTHROSCOPY KNEE (Right)  OPEN LATERAL MENISCUS  REPAIR, INSIDE AND INSIDE OUT (Right)  ARTHROSCOPY, KNEE, WITH PARTIAL LATERAL MENISCECTOMY (Right)  SYNOVECTOMY, KNEE (Right)  DEBRIDEMENT, KNEE (Right)    Patient location: PACU    Anesthesia Type: general    Transport from OR: Transported from OR on 2-3 L/min O2 by NC with adequate spontaneous ventilation    Post pain: adequate analgesia    Post assessment: no apparent anesthetic complications    Post vital signs: stable    Level of consciousness: awake    Nausea/Vomiting: no nausea/vomiting    Complications: none    Transfer of care protocol was followed      Last vitals:   Visit Vitals  /85 (BP Location: Right arm, Patient Position: Lying)   Pulse (!) 126   Temp 37 °C (98.6 °F) (Oral)   Resp 20   Ht 5' 1" (1.549 m)   Wt 88.5 kg (195 lb)   LMP 02/25/2019   SpO2 100%   Breastfeeding? No   BMI 36.84 kg/m²     "

## 2019-04-08 NOTE — PLAN OF CARE
Nina Montesinos has met all discharge criteria from Phase II. Vital Signs are stable, ambulating  without difficulty.Pain is now under control and tolerable for the pt. Pain score 8 at this time. Discharge instructions given, patient verbalized understanding. Discharged from facility via wheelchair in stable condition.

## 2019-04-08 NOTE — ANESTHESIA POSTPROCEDURE EVALUATION
Anesthesia Post Evaluation    Patient: Nina Montesinos    Procedure(s) Performed: Procedure(s) (LRB):  ARTHROSCOPY KNEE (Right)  OPEN LATERAL MENISCUS  REPAIR, INSIDE AND INSIDE OUT (Right)  ARTHROSCOPY, KNEE, WITH PARTIAL LATERAL MENISCECTOMY (Right)  SYNOVECTOMY, KNEE (Right)  DEBRIDEMENT, KNEE (Right)    Final Anesthesia Type: general  Patient location during evaluation: PACU  Patient participation: Yes- Able to Participate  Level of consciousness: awake and alert  Post-procedure vital signs: reviewed and stable  Pain management: adequate  Airway patency: patent  PONV status at discharge: No PONV  Anesthetic complications: no      Cardiovascular status: hemodynamically stable  Respiratory status: unassisted  Hydration status: euvolemic  Follow-up not needed.          Vitals Value Taken Time   /78 4/8/2019  2:50 PM   Temp 37.1 °C (98.8 °F) 4/8/2019  2:20 PM   Pulse 110 4/8/2019  2:50 PM   Resp 16 4/8/2019  2:50 PM   SpO2 97 % 4/8/2019  2:50 PM         Event Time     Out of Recovery 14:17:16          Pain/Azeb Score: Pain Rating Prior to Med Admin: 8 (4/8/2019  2:47 PM)  Pain Rating Post Med Admin: 0 (4/8/2019  2:00 PM)  Azeb Score: 10 (4/8/2019  2:50 PM)

## 2019-04-08 NOTE — OP NOTE
?[facility]   OPERATIVE REPORT   ORTHOPAEDIC SURGERY   PROVIDER: DR. MATT PAVON    PATIENT INFORMATION   Nina Montesinos 23 y.o. female 1995   MRN: [id number]   LOCATION: [facility]     DATE OF PROCEDURE: 4/8/2019     PREOPERATIVE DIAGNOSES:   1. Right knee lateral meniscus bucket handle tear     POSTOPERATIVE DIAGNOSES:   1. Right knee lateral meniscus bucket handle tear  2. Right knee lateral meniscus posterior horn inner third, small radial tear  3. Right knee chondromalacia, diffuse grade 2 lateral tibial plateau, 5 x 8 mm grade 2 lateral femoral condyle middle third  4. Right knee synovitis  5. Right knee anterior interval scar and adhesions from multiple prior surgeries     PROCEDURES PERFORMED:   1. Right knee combined inside-out and all inside lateral meniscus bucket tear repair (complex 22-modifier) (CPT 57909)  2. Right knee arthroscopic partial lateral meniscectomy (CPT 34099)  3. Right knee arthroscopic extensive debridement with limited notchplasty (CPT 25480)  4. Right knee arthroscopic chondroplasty    COMPLEX PROCEDURE:    There was an altered surgical field and a revision case.  The patient has had 3 previous surgeries. There was abnormal anatomy. There was major scarring. Complexity of the service was much greater than the normative procedure.  There was increased time, intensity and technical difficulty of the procedure given the severity of the patient's condition and mental effort required.  This was a highly complicated procedure that required advanced combined open and arthroscopic skill in order to safely and technically perform it.     Surgeon(s) and Role:     * REENA Pavon MD - Primary     * Jose Hamlin MD - Fellow     * SMA Carlos Alberto    ANESTHESIA: General with adductor block    ESTIMATED BLOOD LOSS: 75 cc    IMPLANTS:   Implant Name Type Inv. Item Serial No.  Lot No. LRB No. Used   2-0 orthocord w/ double-armed needles      "[id number] Right 3   2-0 orthocord w/ doubl-armed meniscal needles     [id number] Right 2   SYS NDL DELIVERY FAST  - [id number]  SYS NDL DELIVERY FAST   [employer] [id number] Right 1   SYS NDL DELIVERY FAST  - [id number]  SYS NDL DELIVERY FAST   [employer] [id number] Right 1   2-0orthocord w/ double-armed meniscal needles     [id number] Right 2        FINDINGS:  Lateral meniscus displaced, bucket-handle tear from the posterior horn to midbody, anterior to the popliteal hiatus.     SPECIMENS:   Specimen (12h ago, onward)    None        COMPLICATIONS: None.     INTRAOPERATIVE COUNTS: Correct.     PROPHYLACTIC IV ANTIBIOTICS: Given per [facility] Protocol.    INDICATIONS FOR PROCEDURE: Nina is a 23-year-old with a BMI of 37 who presented to me with laterally based right knee pain. MRI has shown a displaced bucket-handle lateral meniscus tear.  The patient has had 3 previous surgeries, none of which included a lateral meniscus repair.  She has had 2 previous lateral meniscus debridement.  Standing films demonstrate some early valgus deformity at the knee measuring 2-3 degrees. This is an "at risk knee" from my standpoint.  I have discussed this with her.  She is indicated for lateral meniscus repair.  I have concerns regarding healing and she understands that in the event that it does not heal she may ultimately require a lateral meniscus transplant with possible distal femoral osteotomy.  Full informed consent was obtained prior to proceeding.    DETAILS OF THE PROCEDURE: The operative site was identified and marked in the preoperative holding area.  All final questions were answered.  Regional anesthesia was performed per the anesthesia team.  The patient was then brought back to the operating room and placed supine on the operating room table.  Geneneral anesthesia was administered.     Examination of the right knee demonstrated near full passive extension.  Flexion to 130°. Negative " Lachman.  Negative pivot shift.  Negative posterior drawer.  Stable to varus and valgus stress at 0 and 30°.  Negative supine dial test.  Negative Elvira's test.     The right lower extremity was then placed in an arthroscopic cheng with a well-padded tourniquet in place.  The left lower extremity was placed in a stirrup with appropriate padding. The right lower extremity was then prepped and draped in usual sterile fashion for arthroscopic knee surgery.     Verbal timeout was performed.     Standard anterolateral and anteromedial arthroscopic portals were created.  Diagnostic arthroscopy demonstrated the following.  Intact patellofemoral compartment with no evidence of maltracking or malalignment.  No suprapatellar adhesions.  Intact articular cartilage.  Within the medial compartment, the articular cartilage was intact.  The meniscus was probed from posterior to anterior and found to be intact.  There was anterior medial compartment adhesions with synovitis consistent with prior surgery.  Extensive debridement and removal of synovitis and scar was performed from the anterior medial to the anterolateral compartments for infrapatellar release, using a combination of both cautery and shaver. The anterior cruciate ligament and PCL structures were probed and found to be intact.  Again anterior interval and infrapatellar adhesions were encountered.  Laterally the patient was seen to have an incarcerated, flipped bucket handle tear of the lateral meniscus.  There was hemorrhage and synovitis consistent with recent trauma. The shaver was used to debride the outer portion of the flipped mensicus.  Visualization peripherally showed a red-red zone meniscus tear with a minimal, < 3 mm or so of residual peripheral rim of meniscal tissue present, extending from the midbody, around the hiatus and extending posteriorly just lateral to the root attachment, which was intact. Unfortunately there was signs of lateral overload and  meniscal tissue intrasubstance degeneration with diffuse grade 1-2 chondromalacia of the lateral tibial plateau and a 5 x 8 mm area of grade 2 wear over the lateral femoral condyle.  Despite these findings, it was felt that attempt at meniscus repair would be in the patient's best interest. Careful preparation of the repair site was performed using a combination of techniques.  A combination of rasp devices were also used along with a shaver to gently debride the peripheral rim and capsular tissue.  The popliteus tendon was intact. The meniscus was reduced easily with a probe and remained reasonably well reduced following this maneuver. Limited chondroplasty was performed with a shaver.  After meticulous biologic preparation and confirmation of bleeding tissue, attention was turned towards the open posterolateral approach.     The arthroscope was removed and the leg was exsanguinated.  The tourniquet was inflated to 300 mmHg.  The knee was flexed to 90° and an oblique incision was extended from the posterior iliotibial margin towards Gerdy's tubercle. The interval between biceps femoris and iliotibial band was developed.  This was posterior to the palpable LCL structure.  The lateral gastrocnemius tendon was mobilized posteriorly and a spoon retractor was placed anterior to this structure allowing exposure of the posterior lateral capsule of the knee. The posterior margin of the iliotibial band was mobilized and reflected anteriorly for exposure as well.  Care was taken during this approach to protect the area around the more posteriorly positioned common peroneal nerve.      The arthroscope was reintroduced into the anterolateral portal.  Using the zone specific cannula system, sequential inside out suture needles were passed in vertical mattress fashion for a total of 5 femoral sided repair sutures and 2 tibial sided.  A single [employer] 360 all inside fix suture also was used posteriorly just lateral to  the intact root for all inside fixation.  With the knee in flexion, a total of 7 inside out suture repair knots were then tied for good reduction and fixation of lateral meniscus.  Of note, 1 of the vertical mattress inside-out sutures was actually placed through the popliteus at the hiatus simply to maximize repair strength and to minimize excessive repair stress at the edges of the hiatus. Following the repair, probing of the meniscus demonstrated excellent reduction and fixation. Care was taken during this procedure to protect the posterior neurovascular bundle.     To augment the biologic environment, a formal notchplasty was performed over the medial wall of the lateral femoral condyle just above the intact anterior cruciate ligament fibers with a gregorio.  Confirmation of bleeding following preparation was performed.       This concluded the procedure.  The arthroscope was removed and the tourniquet was released.  No significant bleeding was encountered.  The incisions were closed in standard fashion.  The interval between the biceps femoris and iliotibial band was closed with heavy nonabsorbable suture. Monocryl was used to close skin in a subcuticular fashion.  Sterile dressings were applied along with a polar care sleeve.  A T scope brace was applied locked in extension with range of motion set from 0-90°.     The patient was extubated and transferred to the postanesthesia care unit in stable condition.     POSTOPERATIVE PLAN OF CARE:   -The patient will be discharged home per protocol.  -Physical therapy to follow a bucket handle meniscus repair rehabilitation protocol.  Toe-touch weightbearing × 4 weeks with the knee locked in extension. Transition to 25% partial weight-bearing between weeks 4 and 6, again with the brace locked in extension. We will keep the weight-bearing conservative given her poor tissue quality and morbid obesity. Weightbearing as tolerated after 6 weeks.  Range of motion from 0-90° × 6  weeks.  -DVT prophylaxis with aspirin 81 mg twice a day for 2 weeks.

## 2019-04-08 NOTE — ANESTHESIA PROCEDURE NOTES
Peripheral Block    Patient location during procedure: pre-op   Block not for primary anesthetic.  Reason for block: at surgeon's request and post-op pain management   Post-op Pain Location: right knee pain  Start time: 4/8/2019 9:28 AM  Timeout: 4/8/2019 9:27 AM   End time: 4/8/2019 9:32 AM  Staffing  Anesthesiologist: Abdiaziz Canchola MD  Performed: anesthesiologist   Preanesthetic Checklist  Completed: patient identified, site marked, surgical consent, pre-op evaluation, timeout performed, IV checked, risks and benefits discussed and monitors and equipment checked  Peripheral Block  Patient position: supine  Prep: ChloraPrep  Patient monitoring: heart rate, cardiac monitor, continuous pulse ox, continuous capnometry and frequent blood pressure checks  Block type: adductor canal  Laterality: right  Injection technique: single shot  Needle  Needle type: Stimuplex   Needle gauge: 21 G  Needle length: 3.5 in  Needle localization: anatomical landmarks and ultrasound guidance   -ultrasound image captured on disc.  Assessment  Injection assessment: negative aspiration, negative parasthesia and local visualized surrounding nerve  Paresthesia pain: none  Heart rate change: no  Slow fractionated injection: yes  Additional Notes  VSS.  [facility] RN monitoring vitals throughout procedure.  Patient tolerated procedure well.

## 2019-04-08 NOTE — OR NURSING
Pt has met all discharge criteria. Pt requests to be transported to NICU to see her babies.  Awaiting transport.

## 2019-04-08 NOTE — OR NURSING
Reviewed 's  knee arthroscopy discharge instructions and demonstrated the postoperative equipment (polar ice and BREG t-scope knee brace), with verbalized understanding per patient and family.  The patient has her crutches in the car, from a previous knee surgery.                       .

## 2019-04-08 NOTE — OR NURSING
C/o tongue discomfort.  Tip of tongue on L side  Slightly swollen. Dr. Canchola notified.  States that pt can apply cold to tongue to help relieve discomfort.

## 2019-04-08 NOTE — DISCHARGE INSTRUCTIONS
Select on Hyperlink below to print updated instructions from [email or web address]  T-Scope Breg Brace Instructions      Select on Hyperlink below to print updated instructions from [email or web address]  BREGPOLARCARECUBE      Anesthesia: After Your Surgery  Youve just had surgery. During surgery, you received medication called anesthesia to keep you comfortable and pain-free. After surgery, you may experience some pain or nausea. This is common. Here are some tips for feeling better and recovering after surgery.    Going home  Your doctor or nurse will show you how to take care of yourself when you go home. He or she will also answer your questions. Have an adult family member or friend drive you home. For the first 24 hours after your surgery:  · Do not drive or use heavy equipment.  · Do not make important decisions or sign legal documents.  · Avoid alcohol.  · Have someone stay with you, if needed. He or she can watch for problems and help keep you safe.  Be sure to keep all follow-up appointments with your doctor. And rest after your procedure for as long as your doctor tells you to.    Coping with pain  If you have pain after surgery, pain medication will help you feel better. Take it as directed, before pain becomes severe. Also, ask your doctor or pharmacist about other ways to control pain, such as with heat, ice, and relaxation. And follow any other instructions your surgeon or nurse gives you.    URINARY RETENTION  Should you experience a decrease in your urine output or are unable to urinate following surgery, this can be due to the medications given during surgery.  We recommend you going to the nearest Emergency Department.    Tips for taking pain medication  To get the best relief possible, remember these points:  · Pain medications can upset your stomach. Taking them with a little food may help.  · Most pain relievers taken by mouth need at least 20 to 30 minutes to take effect.  · Taking medication on a schedule can  help you remember to take it. Try to time your medication so that you can take it before beginning an activity, such as dressing, walking, or sitting down for dinner.  · Constipation is a common side effect of pain medications. Contact your doctor before taking any medications like laxatives or stool softeners to help relieve constipation. Also ask about any dietary restrictions, because drinking lots of fluids and eating foods like fruits and vegetables that are high in fiber can also help. Remember, dont take laxatives unless your surgeon has prescribed them.  · Mixing alcohol and pain medication can cause dizziness and slow your breathing. It can even be fatal. Dont drink alcohol while taking pain medication.  · Pain medication can slow your reflexes. Dont drive or operate machinery while taking pain medication.  If your health care provider tells you to take acetaminophen to help relieve your pain, ask him or her how much you are supposed to take each day. (Acetaminophen is the generic name for Tylenol and other brand-name pain relievers.) Acetaminophen or other pain relievers may interact with your prescription medicines or other over-the-counter (OTC) drugs. Some prescription medications contain acetaminophen along with other active ingredients. Using both prescription and OTC acetaminophen for pain can cause you to overdose. The FDA recommends that you read the labels on your OTC medications carefully. This will help you to clearly understand the list of active ingredients, dosing instructions, and any warnings. It may also help you avoid taking too much acetaminophen. If you have questions or don't understand the information, ask your pharmacist or health care provider to explain it to you before you take the OTC medication.    Managing nausea  Some people have an upset stomach after surgery. This is often due to anesthesia, pain, pain medications, or the stress of surgery. The following tips will help  you manage nausea and get good nutrition as you recover. If you were on a special diet before surgery, ask your doctor if you should follow it during recovery. These tips may help:  · Dont push yourself to eat. Your body will tell you when to eat and how much.  · Start off with clear liquids and soup. They are easier to digest.  · Progress to semi-solid foods (mashed potatoes, applesauce, and gelatin) as you feel ready.  · Slowly move to solid foods. Dont eat fatty, rich, or spicy foods at first.  · Dont force yourself to have three large meals a day. Instead, eat smaller amounts more often.  · Take pain medications with a small amount of solid food, such as crackers or toast to avoid nausea.      Call your surgeon if    · You feel too sleepy, dizzy, or groggy (medication may be too strong).  · You have side effects like nausea, vomiting, or skin changes (rash, itching, or hives).   © 5232-0979 [employer]. [address] [place] [address], [place] [address]. All rights reserved. This information is not intended as a substitute for professional medical care. Always follow your healthcare professional's instructions.    PLEASE FOLLOW ANY ADDITIONAL INSTRUCTIONS GIVEN TO YOU BY DR PAVON!

## 2019-04-08 NOTE — BRIEF OP NOTE
[facility]  Brief Operative Note     SUMMARY     Surgery Date: 4/8/2019     Surgeon(s) and Role:     * REENA Ortiz MD - Primary    Assistants:   Jose Hamlin MD - Fellow  SMA Carlos Alberto    Pre-op Diagnosis:  Acute lateral meniscus tear of right knee, initial encounter [S83.281A]    Post-op Diagnosis:  Post-Op Diagnosis Codes:     * Acute lateral meniscus tear of right knee, initial encounter [S83.281A]    Procedure(s) (LRB):  ARTHROSCOPY KNEE (Right)  OPEN LATERAL MENISCUS  REPAIR, INSIDE AND INSIDE OUT (Right)  ARTHROSCOPY, KNEE, WITH PARTIAL LATERAL MENISCECTOMY (Right)  SYNOVECTOMY, KNEE (Right)  DEBRIDEMENT, KNEE (Right)    Anesthesia: General    Description of the findings of the procedure: bucket handle tear of lateral meniscus - repaired using hybrid all inside and inside out technique. Medial meniscus intact. ACL/PCL intact. Grade II changes lateral compartment cartilage.    Findings/Key Components: see above.    Estimated Blood Loss: 25mL         Specimens:   Specimen (12h ago, onward)    None          Discharge Note    SUMMARY     Admit Date: 4/8/2019    Discharge Date and Time:  04/08/2019 1:29 PM    Hospital Course (synopsis of major diagnoses, care, treatment, and services provided during the course of the hospital stay): to PACU postop. D/C from PACU once criteria met     Final Diagnosis: Post-Op Diagnosis Codes:     * Acute lateral meniscus tear of right knee, initial encounter [S83.281A]    Disposition: Home or Self Care    Follow Up/Patient Instructions:     Medications:  Reconciled Home Medications:      Medication List      CONTINUE taking these medications    aspirin 81 MG EC tablet  Commonly known as:  ECOTRIN  Take 1 tablet twice a day with food starting after surgery (breakfast and dinner).     HYDROcodone-acetaminophen 5-325 mg per tablet  Commonly known as:  NORCO  Take 1-2 tablets by mouth every 6 (six) hours as needed for Pain.     naproxen 500 MG  tablet  Commonly known as:  NAPROSYN  Take 1 tablet (500 mg total) by mouth 2 (two) times daily with meals. Take with food     ondansetron 4 MG tablet  Commonly known as:  ZOFRAN  Take 1 tablet (4 mg total) by mouth every 8 (eight) hours as needed for Nausea.     oxyCODONE 5 MG immediate release tablet  Commonly known as:  ROXICODONE  Take 1-2 tablets as needed for pain every 6 hours.          Discharge Procedure Orders   Notify your health care provider if you experience any of the following:  temperature >100.4     Notify your health care provider if you experience any of the following:  persistent nausea and vomiting or diarrhea     Notify your health care provider if you experience any of the following:  severe uncontrolled pain     Notify your health care provider if you experience any of the following:  redness, tenderness, or signs of infection (pain, swelling, redness, odor or green/yellow discharge around incision site)     Notify your health care provider if you experience any of the following:  difficulty breathing or increased cough     Notify your health care provider if you experience any of the following:  severe persistent headache     Notify your health care provider if you experience any of the following:  worsening rash     Notify your health care provider if you experience any of the following:  persistent dizziness, light-headedness, or visual disturbances     Notify your health care provider if you experience any of the following:  increased confusion or weakness     Notify your health care provider if you experience any of the following:

## 2019-04-08 NOTE — INTERVAL H&P NOTE
The patient has been examined and the H&P has been reviewed:    I concur with the findings and no changes have occurred since H&P was written.    Anesthesia/Surgery risks, benefits and alternative options discussed and understood by patient/family.          Active Hospital Problems    Diagnosis  POA    Bucket-handle tear of lateral meniscus of right knee as current injury [S83.251A]  Yes      Resolved Hospital Problems   No resolved problems to display.

## 2019-04-17 ENCOUNTER — TELEPHONE (OUTPATIENT)
Dept: OBSTETRICS AND GYNECOLOGY | Facility: CLINIC | Age: 24
End: 2019-04-17

## 2019-04-17 ENCOUNTER — CLINICAL SUPPORT (OUTPATIENT)
Dept: REHABILITATION | Facility: HOSPITAL | Age: 24
End: 2019-04-17
Payer: COMMERCIAL

## 2019-04-17 DIAGNOSIS — M25.561 ACUTE PAIN OF RIGHT KNEE: ICD-10-CM

## 2019-04-17 PROCEDURE — 97110 THERAPEUTIC EXERCISES: CPT

## 2019-04-17 PROCEDURE — 97161 PT EVAL LOW COMPLEX 20 MIN: CPT

## 2019-04-17 NOTE — TELEPHONE ENCOUNTER
Dr sam pt's mom is calling Tereza is very worried about her daughters mental state. Pt delivered early babies are in NICU pt has broken leg and just found out one of the babies may have brain damage and wants to discuss.Tereza # [phone]

## 2019-04-17 NOTE — TELEPHONE ENCOUNTER
"Spoke with mother who is crying on the phone.  Mother states pt is "a mess", she has a lot going on.  One of the babies has PVL, pt tore her meniscus and will have a 4 month recovery, one of the babies is coming home soon, and her job is at risk.  Pt is depressed and angry.  Mother states she has not mentioned any SI or HI.  Offered appt with one of the other MDs since Dr. Haney is out, mother declined but would like a recommendation for pt to talk to someone.  Gave her the number to [facility].      Dr. Haney, she is asking if you can call pt when you get back in the office.    "

## 2019-04-17 NOTE — PLAN OF CARE
[facility]  Physical Therapy Initial Evaluation    Name: Pablo Anusha Montesinos  [facility] Number: [id number]    Therapy Diagnosis:   Encounter Diagnosis   Name Primary?    Acute pain of right knee      Physician: Yefri Weinstein, *    Physician Orders: PT Eval and Treat   Medical Diagnosis from Referral:   Bucket-handle tear of lateral meniscus of right knee as current injury, initial encounter  - Primary       Right knee pain, unspecified chronicity         Evaluation Date: 4/17/2019  Authorization Period Expiration: 12/31/19  Plan of Care Expiration: 8/7/19  Visit # / Visits authorized: 1/ 30    Time In: 1000  Time Out: 1050  Total Billable Time: 50 minutes    Precautions: Standard, Physical therapy to follow a bucket handle meniscus repair rehabilitation protocol.  Toe-touch weightbearing × 4 weeks with the knee locked in extension. Transition to 25% partial weight-bearing between weeks 4 and 6, again with the brace locked in extension. We will keep the weight-bearing conservative given her poor tissue quality and morbid obesity. Weightbearing as tolerated after 6 weeks.  Range of motion from 0-90° × 6 weeks.    Surgery Date: 4/8/19  PROCEDURES PERFORMED:   1. Right knee combined inside-out and all inside lateral meniscus bucket tear repair (complex 22-modifier) (CPT 09128)  2. Right knee arthroscopic partial lateral meniscectomy (CPT 25720)  3. Right knee arthroscopic extensive debridement with limited notchplasty (CPT 68611)  4. Right knee arthroscopic chondroplasty    Subjective   Date of onset: 3/26/19 then sx on 4/8/19  History of current condition - PABLO reports: that she injured knee getting up off floor on March 26th.  States this is her 4th knee surgery on the R.  All other surgeries were meniscectomies.  First surgery was at the age of 15.         Past Medical History:   Diagnosis Date    Anxiety     Depression     reported per pt    Rectal bleeding     rectal bleeding  when trying to defacate - pt having surgery on May 8th, 2018    Right hand fracture     broke growth plate as a teen, no repair     Nina Montesinos  has a past surgical history that includes pr total knee arthroplasty (Bilateral); Tonsillectomy (N/A); ENDOSCOPIC LASER ABLATION  (2018);  section (N/A, 2018); Arthroscopy of knee (Right, 2019); Repair of meniscus of knee (Right, 2019); Knee arthroscopy w/ meniscectomy (Right, 2019); Synovectomy of knee (Right, 2019); and Knee debridement (Right, 2019).    Nina has a current medication list which includes the following prescription(s): aspirin, hydrocodone-acetaminophen, naproxen, ondansetron, and oxycodone, and the following Facility-Administered Medications: levonorgestrel.    Review of patient's allergies indicates:   Allergen Reactions    Latex, natural rubber Hives        Imaging, MRI studies: 3/27/19:   1. Complex tear of lateral meniscus with anterior displacement of the posterior horn.  2. Superficial chondral fibrillation over the posterior weight-bearing lateral femoral condyle.  3. Moderate joint effusion    Prior Therapy: PT for R knee  Social History: 1 story, no steps lives with mother and has twins in hospital.    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  Prior Level of Function: I with all ADLs and activities  Current Level of Function: Unable to bear weight on R LE.    Pain:  Current 3/10, worst 10/10, best 3/10   Location: right knee   Description: Aching, Dull, Sharp and Shooting  Aggravating Factors: laying down at night after a full day  Easing Factors: in the morning when first waking up.    Pts goals: To be able to walk without c/o pain or compensation.      Objective     Observation: Pt is healthy and in no acute distress.  Post-op dressing/brace in place.    Gait: Pt ambulates with B axillary crutches and NWB on the R with hinged knee brace  "locked in extension.     Functional tests:               SL Squat: Not performed 2/2 post-op.              DL Squat: Not performed 2/2 post-op.              Step-down: Not performed 2/2 post-op.              SLS EO: Not performed 2/2 post-op.              SLS EC: Not performed 2/2 post-op.     Range of Motion:   Knee Right  Left    Flexion 40 (passive) 137   Extension 5 hyper 12 hyper      Lower Extremity Strength:  Formal MMT not performed 2/2 POD#9 and increased pain.  Fair quad activation noted R LE.     Special Tests:  Not performed 2/2 post-op.     Joint Mobility: slight hypomobility as expected post-op.     Palpation: no TTP     Sensation:  Intact; diminished 2/2 residual nerve block.     Flexibility:  Grossly hypomobile quad, hamstring and gastroc as expected on post-op L LE     Edema: min as expected post-op          CMS Impairment/Limitation/Restriction for FOTO Knee Survey    Therapist reviewed FOTO scores for Pablo Montesinos on 4/17/2019.   FOTO documents entered into [employer] - see Media section.    Limitation Score: 76%         TREATMENT   Treatment Time In: 1030  Treatment Time Out: 1050  Total Treatment time separate from Evaluation: 20 minutes    PABLO received therapeutic exercises to develop strength, ROM and flexibility for 10 minutes including:  Knee extension stretch (ankle propped) x 3 min  Quad sets 10 x 5"   PASSIVE knee flexion off EOM   HSS/calf stretch w/ strap x 30"       PABLO received cold pack for 10 minutes to R knee.    Home Exercises and Patient Education Provided    Education provided re: diagnosis, goals for therapy, role of therapy for care, exercises/HEP    Written Home Exercises Provided: yes.  Exercises were reviewed and PABLO was able to demonstrate them prior to the end of the session.   Pt received a written copy of exercises to perform at home. PABLO demonstrated good  understanding of the education provided.     See EMR under patient instructions for exercises " given.   Assessment   Nina is a 23 y.o. female referred to outpatient Physical Therapy s/p R knee lateral meniscus bucket handle repair on 4/8/19. Pt presents with decreased knee ROM, decreased strength, decreased flexibility, impaired gait/balance, and decreased functional mobility of R LE.  Pt will benefit from physical therapy, specifically stretching, strengthening, and eventually balance/gait training, in order to reduce her c/o pain, improve impairments and functional limitations.    Pt prognosis is Good.   Pt will benefit from skilled outpatient Physical Therapy to address the deficits stated above and in the chart below, provide pt/family education, and to maximize pt's level of independence.     Plan of care discussed with patient: Yes  Pt's spiritual, cultural and educational needs considered and patient is agreeable to the plan of care and goals as stated below:     Anticipated Barriers for therapy: pt is single mom with preemie twins    Medical Necessity is demonstrated by the following  History  Co-morbidities and personal factors that may impact the plan of care Co-morbidities:   Prior knee surgery    Personal Factors:   no deficits     low   Examination  Body Structures and Functions, activity limitations and participation restrictions that may impact the plan of care Body Regions:   R LE    Body Systems:    gross symmetry  ROM  strength  balance  gait  transfers  transitions  motor control  motor learning    Participation Restrictions:   Walking, squatting, driving    Activity limitations:   Learning and applying knowledge  no deficits    General Tasks and Commands  no deficits    Communication  no deficits    Mobility  lifting and carrying objects  walking  driving (bike, car, motorcycle)    Self care  washing oneself (bathing, drying, washing hands)  dressing    Domestic Life  shopping  cooking  doing house work (cleaning house, washing dishes, laundry)  assisting  others    Interactions/Relationships  no deficits    Life Areas  no deficits    Community and Social Life  community life  recreation and leisure         high   Clinical Presentation stable and uncomplicated low   Decision Making/ Complexity Score: low     GOALS: Short Term Goals:  8 weeks  1.Report decreased R knee pain  < / =  0-1/10  to increase tolerance for ADLs/ walking.  2. Increase knee ROM to 0-120 degrees  in order to be able to perform ADLs without difficulty.  3. Increase strength to 3+/5 MMT grade in R LE  to increase tolerance for ADL and work activities.  4. Pt to tolerate HEP to improve ROM and independence with ADL's    Long Term Goals: 16 weeks  1.Report decreased R knee pain < / = 0/10  to increase tolerance for ADLs/walking.  2.Patient goal: To be able to walk without c/o pain or compensation.    3.Increase strength to >/= 4+/5 in R LE  to increase tolerance for ADL and work activities.  4. Pt will be able to perform SL squat with min to no compensation and without c/o pain.      Plan   Plan of care Certification: 4/17/2019 to 8/7/19.    Outpatient Physical Therapy 2 times weekly for 16 weeks to include the following interventions: Electrical Stimulation Russian, Gait Training, Manual Therapy, Moist Heat/ Ice, Neuromuscular Re-ed, Patient Education, Therapeutic Activites and Therapeutic Exercise.     Ximena Oliveira, PT, DPT, OCS

## 2019-04-17 NOTE — TELEPHONE ENCOUNTER
Called patient to discuss her current situation. Offered her an appointment tomorrow with Dr. Hernandez. Patient states that she might have trouble making it to the appointment because of her knee issues but she'll call to cancel if she does. Has not reached out to any of the mental health resources provided by Jeni earlier today but has them. Feels stressed and irritated but denies SI/HI. I discussed with the patient that this would be an incredibly stressful situation for anyone, and talking to someone may help. Stressed that she should reach out to us if she needs anything. Scheduled with Kaylin tomorrow at 3:15.

## 2019-04-18 ENCOUNTER — POSTPARTUM VISIT (OUTPATIENT)
Dept: OBSTETRICS AND GYNECOLOGY | Facility: CLINIC | Age: 24
End: 2019-04-18
Attending: STUDENT IN AN ORGANIZED HEALTH CARE EDUCATION/TRAINING PROGRAM
Payer: COMMERCIAL

## 2019-04-18 VITALS
HEIGHT: 61 IN | SYSTOLIC BLOOD PRESSURE: 112 MMHG | BODY MASS INDEX: 38.1 KG/M2 | DIASTOLIC BLOOD PRESSURE: 70 MMHG | WEIGHT: 201.81 LBS

## 2019-04-18 DIAGNOSIS — Z30.9 ENCOUNTER FOR CONTRACEPTIVE MANAGEMENT, UNSPECIFIED TYPE: ICD-10-CM

## 2019-04-18 DIAGNOSIS — F32.A DEPRESSION, UNSPECIFIED DEPRESSION TYPE: Primary | ICD-10-CM

## 2019-04-18 PROCEDURE — 3008F PR BODY MASS INDEX (BMI) DOCUMENTED: ICD-10-PCS | Mod: CPTII,S$GLB,, | Performed by: STUDENT IN AN ORGANIZED HEALTH CARE EDUCATION/TRAINING PROGRAM

## 2019-04-18 PROCEDURE — 99213 OFFICE O/P EST LOW 20 MIN: CPT | Mod: S$GLB,,, | Performed by: STUDENT IN AN ORGANIZED HEALTH CARE EDUCATION/TRAINING PROGRAM

## 2019-04-18 PROCEDURE — 99999 PR PBB SHADOW E&M-EST. PATIENT-LVL III: ICD-10-PCS | Mod: PBBFAC,,, | Performed by: STUDENT IN AN ORGANIZED HEALTH CARE EDUCATION/TRAINING PROGRAM

## 2019-04-18 PROCEDURE — 3008F BODY MASS INDEX DOCD: CPT | Mod: CPTII,S$GLB,, | Performed by: STUDENT IN AN ORGANIZED HEALTH CARE EDUCATION/TRAINING PROGRAM

## 2019-04-18 PROCEDURE — 99999 PR PBB SHADOW E&M-EST. PATIENT-LVL III: CPT | Mod: PBBFAC,,, | Performed by: STUDENT IN AN ORGANIZED HEALTH CARE EDUCATION/TRAINING PROGRAM

## 2019-04-18 PROCEDURE — 99213 PR OFFICE/OUTPT VISIT, EST, LEVL III, 20-29 MIN: ICD-10-PCS | Mod: S$GLB,,, | Performed by: STUDENT IN AN ORGANIZED HEALTH CARE EDUCATION/TRAINING PROGRAM

## 2019-04-18 NOTE — Clinical Note
She didn't want to start anything today.  Referred her to Psych.  Coming to see you for IUD follow up.

## 2019-04-19 NOTE — PROGRESS NOTES
"  Subjective:      Nina Montesinos is a 23 y.o.  who presents for follow up.  Patient is s/p  delivery at 24 weeks secondary to PPROM and chorioamnionitis.  She had mono mono twin IUP.  Babies are still in NICU.  Patient has a history of depression and anxiety.  She reprots she has been on several medications but doesn't feel that any of them have helped.  She remembers "vyvanse" helping.  Was on celexa during pregnancy but has come off of this.  Expresses concerns with living with her mother as she feels she does nto have her own space.  Would also like babies to be out of NICU.  Denies any SI or HI.  Had Mirena IUD placed and is doing well with it.  Misssed her follow up exam but declines exam today as she is on her cycle.  No other issues or concerns.      Past Medical History:   Diagnosis Date    Anxiety     Depression     reported per pt    Rectal bleeding     rectal bleeding when trying to defacate - pt having surgery on May 8th, 2018    Right hand fracture     broke growth plate as a teen, no repair       Current Outpatient Medications:     HYDROcodone-acetaminophen (NORCO) 5-325 mg per tablet, Take 1-2 tablets by mouth every 6 (six) hours as needed for Pain., Disp: 20 tablet, Rfl: 0    oxyCODONE (ROXICODONE) 5 MG immediate release tablet, Take 1-2 tablets as needed for pain every 6 hours., Disp: 30 tablet, Rfl: 0    Current Facility-Administered Medications:     levonorgestrel 20 mcg/24 hr (5 years) IUD 1 Intra Uterine Device, 1 Intra Uterine Device, Intrauterine, , Duyen Haney MD, 1 Intra Uterine Device at 19 1040      Objective:     Vitals:    19 1519   BP: 112/70       APPEARANCE: Well nourished, well developed, in no acute distress.  PSYCH: Appropriate mood and affect.  NECK:  Supple, no thyromegaly.  ABDOMEN:  Soft, nontender.  GENITOURINARY:  Deferred.   EXTREMITIES: No edema.  R knee s/p repair.      Assessment:     Depression, unspecified depression type  -   "   Ambulatory Referral to Psychiatry    Encounter for contraceptive management, unspecified type        Plan:     1. Postpartum course reviewed and discussed with patient.  All questions answered.  Discussed depression symptoms.  Counseling information given.  Offered trial of SSRI which patient declines at this time.  Will refer to Psychiatry for continued evaluation and management as patient has been on several medications.  ED precautions reviewed.  2.  Contraception:  RTC in 2-3 weeks for IUD string check and follow up.

## 2019-04-22 ENCOUNTER — TELEPHONE (OUTPATIENT)
Dept: INTERNAL MEDICINE | Facility: CLINIC | Age: 24
End: 2019-04-22

## 2019-04-22 NOTE — PROGRESS NOTES
S:Nina Montesinos presents for post-operative evaluation.     DATE OF PROCEDURE: 4/8/2019   PROCEDURES PERFORMED:   1. Right knee combined inside-out and all inside lateral meniscus bucket tear repair (complex 22-modifier)   2. Right knee arthroscopic partial lateral meniscectomy   3. Right knee arthroscopic extensive debridement with limited notchplasty  4. Right knee arthroscopic chondroplasty    Nina Montesinos reports to be doing well 2 wk s/p the above mentioned procedure. Accompanied by her mother. Presents today NWB on crutches with t-scope brace locked in extension. She denies fevers, chills, night sweats, chest pain, difficulty breathing, calf pain or tenderness. Has been in for 1 therapy session thus far with Ximena at the [place] location. Working on her motion at home on her own as well. Seeing good progress daily. Pain levels are improving. Has d/c'd pain medication.     O: RLE - The incisions are healing well. No signs of infection. Sutures were removed. No significant pain or unusual tenderness. Quad actively fires. Good patellar mobility. Full extension. Flexion to 80. Ligamentously stable.     A/P: Arthroscopic pictures were reviewed with the patient and her mother. Plan to follow the rehab plan as previously outlined - follow the bucket handle meniscus repair rehabilitation protocol.  Conservative given her morbid obesity, prior surgery and the poor tissue quality noted intraoperatively. Toe-touch weightbearing × 4 weeks with the knee locked in extension. Transition to 25% partial weight-bearing between weeks 4 and 6, again with the brace locked in extension. Weightbearing as tolerated after 6 weeks.  Range of motion from 0-90° × 6 weeks.  RTC in 4 weeks.

## 2019-04-23 ENCOUNTER — OFFICE VISIT (OUTPATIENT)
Dept: SPORTS MEDICINE | Facility: CLINIC | Age: 24
End: 2019-04-23
Payer: COMMERCIAL

## 2019-04-23 VITALS
SYSTOLIC BLOOD PRESSURE: 138 MMHG | WEIGHT: 201 LBS | HEART RATE: 98 BPM | BODY MASS INDEX: 37.95 KG/M2 | HEIGHT: 61 IN | DIASTOLIC BLOOD PRESSURE: 79 MMHG

## 2019-04-23 DIAGNOSIS — Z47.89 SURGICAL AFTERCARE, MUSCULOSKELETAL SYSTEM: Primary | ICD-10-CM

## 2019-04-23 PROCEDURE — 99024 PR POST-OP FOLLOW-UP VISIT: ICD-10-PCS | Mod: S$GLB,,, | Performed by: ORTHOPAEDIC SURGERY

## 2019-04-23 PROCEDURE — 99999 PR PBB SHADOW E&M-EST. PATIENT-LVL III: CPT | Mod: PBBFAC,,, | Performed by: ORTHOPAEDIC SURGERY

## 2019-04-23 PROCEDURE — 99024 POSTOP FOLLOW-UP VISIT: CPT | Mod: S$GLB,,, | Performed by: ORTHOPAEDIC SURGERY

## 2019-04-23 PROCEDURE — 99999 PR PBB SHADOW E&M-EST. PATIENT-LVL III: ICD-10-PCS | Mod: PBBFAC,,, | Performed by: ORTHOPAEDIC SURGERY

## 2019-04-26 ENCOUNTER — TELEPHONE (OUTPATIENT)
Dept: SPORTS MEDICINE | Facility: CLINIC | Age: 24
End: 2019-04-26

## 2019-04-26 NOTE — TELEPHONE ENCOUNTER
Received FMLA form and RTW form. Forms completed and faxed to [phone] per the patients request.    [facility]

## 2019-05-17 ENCOUNTER — TELEPHONE (OUTPATIENT)
Dept: REHABILITATION | Facility: HOSPITAL | Age: 24
End: 2019-05-17

## 2019-05-17 NOTE — TELEPHONE ENCOUNTER
Called pt to ask about her missed/cancelled appointments.  Pt states that her daughter is home from the hospital and on oxygen so she can't get to physical therapy at this time.  States that she is doing her HEP and doesn't have any knee pain currently.  Encouraged pt to try to come into PT if her schedule allows her.  Pt said that she would look at schedule and call back.     Ximena Oliveira, PT, DPT, OCS

## 2019-05-20 NOTE — PROGRESS NOTES
S:Nina Montesinos presents for post-operative evaluation.     DATE OF PROCEDURE: 4/8/2019   PROCEDURES PERFORMED:   1. Right knee combined inside-out and all inside lateral meniscus bucket tear repair (complex 22-modifier)   2. Right knee arthroscopic partial lateral meniscectomy   3. Right knee arthroscopic extensive debridement with limited notchplasty  4. Right knee arthroscopic chondroplasty     Nina Montesinos reports to be doing well 6 wk s/p the above mentioned procedure. Presents today 25% WB on crutches with t-scope brace locked in extension. Unfortunately, she has been unable to go to therapy due to some personal and work conflicts. Anticipates that once she returns to work, she will be able to reengage with therapy on a regular basis. Doing a HEP. Denies sig pain. No swelling issues.     O: RLE - The incisions are well healed. No signs of infection. No significant pain or unusual tenderness. Quad actively fires. Good patellar mobility. Full extension. Flexion to 100 with ease. Ligamentously stable.     A/P: Arthroscopic pictures were reviewed with the patient. Discussed importance of re engaging with therapy. Plan to follow the rehab plan as previously outlined - follow the bucket handle meniscus repair rehabilitation protocol.  Conservative given her morbid obesity, prior surgery and the poor tissue quality noted intraoperatively. Can now transition to weightbearing as tolerated and increase motion as tolerated.  Focus on dynamic functional control the knee and quadriceps strengthening.  Counseled on no high impact activity were deep knee bends until cleared to do so by me.  Will be around the 6 month po.  RTC in 6 weeks. All questions answered.

## 2019-05-21 ENCOUNTER — CLINICAL SUPPORT (OUTPATIENT)
Dept: OTHER | Facility: CLINIC | Age: 24
End: 2019-05-21
Payer: COMMERCIAL

## 2019-05-21 ENCOUNTER — OFFICE VISIT (OUTPATIENT)
Dept: SPORTS MEDICINE | Facility: CLINIC | Age: 24
End: 2019-05-21
Payer: COMMERCIAL

## 2019-05-21 VITALS
HEART RATE: 82 BPM | DIASTOLIC BLOOD PRESSURE: 78 MMHG | SYSTOLIC BLOOD PRESSURE: 134 MMHG | BODY MASS INDEX: 37.95 KG/M2 | HEIGHT: 61 IN | WEIGHT: 201 LBS

## 2019-05-21 DIAGNOSIS — Z00.8 ENCOUNTER FOR OTHER GENERAL EXAMINATION: ICD-10-CM

## 2019-05-21 DIAGNOSIS — Z47.89 SURGICAL AFTERCARE, MUSCULOSKELETAL SYSTEM: Primary | ICD-10-CM

## 2019-05-21 PROCEDURE — 99024 POSTOP FOLLOW-UP VISIT: CPT | Mod: S$GLB,,, | Performed by: ORTHOPAEDIC SURGERY

## 2019-05-21 PROCEDURE — 99401 PR PREVENT COUNSEL,INDIV,15 MIN: ICD-10-PCS | Mod: S$GLB,,, | Performed by: INTERNAL MEDICINE

## 2019-05-21 PROCEDURE — 82947 PR  ASSAY QUANTITATIVE,BLOOD GLUCOSE: ICD-10-PCS | Mod: QW,S$GLB,, | Performed by: INTERNAL MEDICINE

## 2019-05-21 PROCEDURE — 80061 PR  LIPID PANEL: ICD-10-PCS | Mod: QW,S$GLB,, | Performed by: INTERNAL MEDICINE

## 2019-05-21 PROCEDURE — 99999 PR PBB SHADOW E&M-EST. PATIENT-LVL III: ICD-10-PCS | Mod: PBBFAC,,, | Performed by: ORTHOPAEDIC SURGERY

## 2019-05-21 PROCEDURE — 82947 ASSAY GLUCOSE BLOOD QUANT: CPT | Mod: QW,S$GLB,, | Performed by: INTERNAL MEDICINE

## 2019-05-21 PROCEDURE — 99999 PR PBB SHADOW E&M-EST. PATIENT-LVL III: CPT | Mod: PBBFAC,,, | Performed by: ORTHOPAEDIC SURGERY

## 2019-05-21 PROCEDURE — 99401 PREV MED CNSL INDIV APPRX 15: CPT | Mod: S$GLB,,, | Performed by: INTERNAL MEDICINE

## 2019-05-21 PROCEDURE — 99024 PR POST-OP FOLLOW-UP VISIT: ICD-10-PCS | Mod: S$GLB,,, | Performed by: ORTHOPAEDIC SURGERY

## 2019-05-21 PROCEDURE — 80061 LIPID PANEL: CPT | Mod: QW,S$GLB,, | Performed by: INTERNAL MEDICINE

## 2019-05-22 VITALS — HEIGHT: 61 IN | BODY MASS INDEX: 37.98 KG/M2

## 2019-05-22 LAB
GLUCOSE SERPL-MCNC: 88 MG/DL (ref 60–140)
HDLC SERPL-MCNC: 48 MG/DL
POC CHOLESTEROL, LDL (DOCK): 147 MG/DL
POC CHOLESTEROL, TOTAL: 216 MG/DL
TRIGL SERPL-MCNC: 106 MG/DL

## 2019-05-27 ENCOUNTER — TELEPHONE (OUTPATIENT)
Dept: SPORTS MEDICINE | Facility: CLINIC | Age: 24
End: 2019-05-27

## 2019-05-27 NOTE — TELEPHONE ENCOUNTER
Left v/m for patient that her RTW form stating she cannot return to work has been completed. The original is at the  for her to .    Jo Sorto MA  [facility]

## 2019-06-05 ENCOUNTER — OFFICE VISIT (OUTPATIENT)
Dept: OBSTETRICS AND GYNECOLOGY | Facility: CLINIC | Age: 24
End: 2019-06-05
Attending: OBSTETRICS & GYNECOLOGY
Payer: COMMERCIAL

## 2019-06-05 VITALS
SYSTOLIC BLOOD PRESSURE: 108 MMHG | DIASTOLIC BLOOD PRESSURE: 76 MMHG | BODY MASS INDEX: 37.86 KG/M2 | WEIGHT: 200.5 LBS | HEIGHT: 61 IN

## 2019-06-05 DIAGNOSIS — Z30.431 IUD CHECK UP: Primary | ICD-10-CM

## 2019-06-05 DIAGNOSIS — N92.6 IRREGULAR PERIODS: ICD-10-CM

## 2019-06-05 PROCEDURE — 3008F BODY MASS INDEX DOCD: CPT | Mod: CPTII,S$GLB,, | Performed by: OBSTETRICS & GYNECOLOGY

## 2019-06-05 PROCEDURE — 99213 OFFICE O/P EST LOW 20 MIN: CPT | Mod: S$GLB,,, | Performed by: OBSTETRICS & GYNECOLOGY

## 2019-06-05 PROCEDURE — 99213 PR OFFICE/OUTPT VISIT, EST, LEVL III, 20-29 MIN: ICD-10-PCS | Mod: S$GLB,,, | Performed by: OBSTETRICS & GYNECOLOGY

## 2019-06-05 PROCEDURE — 3008F PR BODY MASS INDEX (BMI) DOCUMENTED: ICD-10-PCS | Mod: CPTII,S$GLB,, | Performed by: OBSTETRICS & GYNECOLOGY

## 2019-06-05 PROCEDURE — 99999 PR PBB SHADOW E&M-EST. PATIENT-LVL III: CPT | Mod: PBBFAC,,, | Performed by: OBSTETRICS & GYNECOLOGY

## 2019-06-05 PROCEDURE — 99999 PR PBB SHADOW E&M-EST. PATIENT-LVL III: ICD-10-PCS | Mod: PBBFAC,,, | Performed by: OBSTETRICS & GYNECOLOGY

## 2019-06-05 NOTE — PROGRESS NOTES
Subjective:       Patient ID: Nina Montesinos is a 23 y.o. female.    Chief Complaint:  Follow-up (Patient presents for Mirena IUD check. Reports headaches, breast tenderness, acne break outs and cravings. Also reports long lasting periods. )      Patient Active Problem List   Diagnosis    Dysmenorrhea    Chorioamnionitis in second trimester    Generalized anxiety disorder     premature rupture of membranes (PPROM) with onset of labor within 24 hours of rupture in second trimester, antepartum     delivery delivered    Bucket-handle tear of lateral meniscus of right knee as current injury    Acute pain of right knee       History of Present Illness  23 y.o. yo  here for IUD check. Says she has periods that last 2-3 weeks with Mirena. Needs tampon, more than just spotting. Also acne and breast tenderness, did UPT at home that was negative. Offered OCP with IUD still in place, pt agrees. All questions answered. One baby is home with her. Has been home for about a month. Here today in office in [facility] with nasal cannula in place. Sister is still in NICU, just had another bowel perf and needed another surgery. Doing better. Patient is sexually active. Living at her dad's house while he is out and her mom and baby's dad's mom take care of her during the day while Nina is at work.       Past Medical History:   Diagnosis Date    Anxiety     Depression     reported per pt    Rectal bleeding     rectal bleeding when trying to defacate - pt having surgery on May 8th, 2018    Right hand fracture     broke growth plate as a teen, no repair       Past Surgical History:   Procedure Laterality Date    ARTHROSCOPY KNEE Right 2019    Performed [facility]    ARTHROSCOPY, KNEE, WITH PARTIAL LATERAL MENISCECTOMY Right 2019    Per[facility] at [facility]     SECTION N/A 2018    Performed by Duyen Haney [facility] at [facility] L[facility]D     DEBRIDEMENT, KNEE Right 2019    Performed by REE[facility]iz [facility] at [facility] [place] OR    ENDOSCOPIC LASER ABLATION   2018    Dr. Aburto, [facility]    OPEN LATERAL MENISCUS  REPAIR, INSIDE AND INSIDE OUT Right 2019    Performed by REENA Ortiz [facility] at [facility] [place] OR    UT TOTAL KNEE ARTHROPLASTY Bilateral     4 total:  3 right for meniscus tears, 1 left knee meniscus tear due to sports related injuries    SYNOVECTOMY, KNEE Right 2019    Performed by REENA Ortiz MD at [facility] [place] OR    TONSILLECTOMY N/A        OB History    Para Term  AB Living   1 1 0 1 0 2   SAB TAB Ectopic Multiple Live Births   0 0 0 1 2      # Outcome Date GA Lbr Constantine/2nd Weight Sex Delivery Anes PTL Lv   1A  18 24w4d   F CS-Classical Spinal Y ANDRE      Complications: Chorioamnionitis   1B  18 24w4d   F CS-Classical Spinal Y ANDRE      Complications: Chorioamnionitis      Obstetric Comments   Menarche 15       Patient's last menstrual period was 2019.   Date of Last Pap: 10/25/2017    Review of Systems  Review of Systems   Constitutional: Negative for fatigue and unexpected weight change.   Respiratory: Negative for shortness of breath.    Cardiovascular: Negative for chest pain.   Gastrointestinal: Negative for abdominal pain, constipation, diarrhea, nausea and vomiting.   Genitourinary: Positive for menstrual problem. Negative for dysuria.   Musculoskeletal: Negative for back pain.   Skin: Negative for rash.   Neurological: Negative for headaches.   Hematological: Does not bruise/bleed easily.   Psychiatric/Behavioral: Negative for behavioral problems.        Objective:   Physical Exam:   Constitutional: She appears well-developed and well-nourished.               Genitourinary: Vagina normal. Cervix is normal. Right adnexum displays no mass and no tenderness. Left adnexum displays no mass and no tenderness. Additional cervical findings: IUD strings visualized                     Assessment/ Plan:      1. IUD check up     2. Irregular periods  norethindrone-e.estradiol-iron 1 mg-20 mcg (24)/75 mg (4) Oral per tablet     IUD in place with strings visible.  Will try low dose OCP to see if that helps with symptoms and bleeding. Pt agrees.   Declines any SSRI/psych meds    Follow-up with me in 1 year

## 2019-09-04 ENCOUNTER — TELEPHONE (OUTPATIENT)
Dept: SPORTS MEDICINE | Facility: CLINIC | Age: 24
End: 2019-09-04

## 2019-09-06 ENCOUNTER — TELEPHONE (OUTPATIENT)
Dept: SPORTS MEDICINE | Facility: CLINIC | Age: 24
End: 2019-09-06

## 2019-09-06 NOTE — TELEPHONE ENCOUNTER
Left v/m for patient that her [employer] forms have been completed. I did not fax them since her portion has not been completed. Forms are at the  for her.    [facility]

## 2019-11-18 PROBLEM — O42.012 PRETERM PREMATURE RUPTURE OF MEMBRANES (PPROM) WITH ONSET OF LABOR WITHIN 24 HOURS OF RUPTURE IN SECOND TRIMESTER, ANTEPARTUM: Status: RESOLVED | Noted: 2018-12-08 | Resolved: 2019-11-18

## 2021-04-21 ENCOUNTER — OFFICE VISIT (OUTPATIENT)
Dept: OBSTETRICS AND GYNECOLOGY | Facility: CLINIC | Age: 26
End: 2021-04-21
Attending: OBSTETRICS & GYNECOLOGY
Payer: MEDICAID

## 2021-04-21 VITALS
HEIGHT: 61 IN | SYSTOLIC BLOOD PRESSURE: 112 MMHG | BODY MASS INDEX: 33.99 KG/M2 | WEIGHT: 180 LBS | DIASTOLIC BLOOD PRESSURE: 78 MMHG

## 2021-04-21 DIAGNOSIS — Z01.419 ENCOUNTER FOR GYNECOLOGICAL EXAMINATION (GENERAL) (ROUTINE) WITHOUT ABNORMAL FINDINGS: ICD-10-CM

## 2021-04-21 DIAGNOSIS — Z11.3 SCREENING FOR STD (SEXUALLY TRANSMITTED DISEASE): ICD-10-CM

## 2021-04-21 DIAGNOSIS — Z12.4 ENCOUNTER FOR SCREENING FOR CERVICAL CANCER: Primary | ICD-10-CM

## 2021-04-21 DIAGNOSIS — F43.21 SITUATIONAL DEPRESSION: ICD-10-CM

## 2021-04-21 PROBLEM — O41.1220 CHORIOAMNIONITIS IN SECOND TRIMESTER: Status: RESOLVED | Noted: 2018-12-08 | Resolved: 2021-04-21

## 2021-04-21 PROCEDURE — 99999 PR PBB SHADOW E&M-EST. PATIENT-LVL III: CPT | Mod: PBBFAC,,, | Performed by: OBSTETRICS & GYNECOLOGY

## 2021-04-21 PROCEDURE — 99213 OFFICE O/P EST LOW 20 MIN: CPT | Mod: PBBFAC | Performed by: OBSTETRICS & GYNECOLOGY

## 2021-04-21 PROCEDURE — 99395 PR PREVENTIVE VISIT,EST,18-39: ICD-10-PCS | Mod: S$PBB,,, | Performed by: OBSTETRICS & GYNECOLOGY

## 2021-04-21 PROCEDURE — 99999 PR PBB SHADOW E&M-EST. PATIENT-LVL III: ICD-10-PCS | Mod: PBBFAC,,, | Performed by: OBSTETRICS & GYNECOLOGY

## 2021-04-21 PROCEDURE — 99395 PREV VISIT EST AGE 18-39: CPT | Mod: S$PBB,,, | Performed by: OBSTETRICS & GYNECOLOGY

## 2021-04-21 PROCEDURE — 88175 CYTOPATH C/V AUTO FLUID REDO: CPT | Performed by: OBSTETRICS & GYNECOLOGY

## 2021-04-21 RX ORDER — LISDEXAMFETAMINE DIMESYLATE 50 MG/1
50 CAPSULE ORAL EVERY MORNING
COMMUNITY
End: 2021-12-29 | Stop reason: SDUPTHER

## 2021-04-21 RX ORDER — FLUOXETINE 10 MG/1
10 CAPSULE ORAL DAILY
Qty: 30 CAPSULE | Refills: 11 | Status: SHIPPED | OUTPATIENT
Start: 2021-04-21 | End: 2021-11-30 | Stop reason: ALTCHOICE

## 2021-04-22 ENCOUNTER — TELEPHONE (OUTPATIENT)
Dept: OBSTETRICS AND GYNECOLOGY | Facility: CLINIC | Age: 26
End: 2021-04-22

## 2021-04-22 LAB
C TRACH RRNA SPEC QL NAA+PROBE: NEGATIVE
N GONORRHOEA RRNA SPEC QL NAA+PROBE: NEGATIVE

## 2021-04-26 LAB
FINAL PATHOLOGIC DIAGNOSIS: NORMAL
Lab: NORMAL

## 2021-05-19 ENCOUNTER — TELEPHONE (OUTPATIENT)
Dept: OBSTETRICS AND GYNECOLOGY | Facility: CLINIC | Age: 26
End: 2021-05-19

## 2021-07-08 ENCOUNTER — OFFICE VISIT (OUTPATIENT)
Dept: URGENT CARE | Facility: CLINIC | Age: 26
End: 2021-07-08
Payer: MEDICAID

## 2021-07-08 VITALS
OXYGEN SATURATION: 98 % | HEIGHT: 61 IN | BODY MASS INDEX: 33.99 KG/M2 | DIASTOLIC BLOOD PRESSURE: 83 MMHG | SYSTOLIC BLOOD PRESSURE: 124 MMHG | HEART RATE: 103 BPM | RESPIRATION RATE: 16 BRPM | WEIGHT: 180 LBS | TEMPERATURE: 98 F

## 2021-07-08 DIAGNOSIS — H66.91 ACUTE OTITIS MEDIA, RIGHT: Primary | ICD-10-CM

## 2021-07-08 PROCEDURE — 99213 OFFICE O/P EST LOW 20 MIN: CPT | Mod: S$GLB,,, | Performed by: FAMILY MEDICINE

## 2021-07-08 PROCEDURE — 99213 PR OFFICE/OUTPT VISIT, EST, LEVL III, 20-29 MIN: ICD-10-PCS | Mod: S$GLB,,, | Performed by: FAMILY MEDICINE

## 2021-07-08 RX ORDER — AMOXICILLIN 875 MG/1
875 TABLET, FILM COATED ORAL 2 TIMES DAILY
Qty: 20 TABLET | Refills: 0 | Status: SHIPPED | OUTPATIENT
Start: 2021-07-08 | End: 2021-07-18

## 2021-07-08 RX ORDER — LORATADINE 10 MG/1
10 TABLET ORAL DAILY
Qty: 30 TABLET | Refills: 2 | Status: SHIPPED | OUTPATIENT
Start: 2021-07-08 | End: 2023-03-13

## 2021-07-12 ENCOUNTER — PATIENT MESSAGE (OUTPATIENT)
Dept: OBSTETRICS AND GYNECOLOGY | Facility: CLINIC | Age: 26
End: 2021-07-12

## 2021-07-15 ENCOUNTER — OFFICE VISIT (OUTPATIENT)
Dept: OBSTETRICS AND GYNECOLOGY | Facility: CLINIC | Age: 26
End: 2021-07-15
Attending: OBSTETRICS & GYNECOLOGY
Payer: MEDICAID

## 2021-07-15 VITALS — HEIGHT: 61 IN | BODY MASS INDEX: 34.67 KG/M2 | WEIGHT: 183.63 LBS

## 2021-07-15 DIAGNOSIS — Z20.2 EXPOSURE TO SEXUALLY TRANSMITTED DISEASE (STD): Primary | ICD-10-CM

## 2021-07-15 DIAGNOSIS — N76.6 VULVAR ULCER: ICD-10-CM

## 2021-07-15 PROCEDURE — 99999 PR PBB SHADOW E&M-EST. PATIENT-LVL III: ICD-10-PCS | Mod: PBBFAC,,, | Performed by: OBSTETRICS & GYNECOLOGY

## 2021-07-15 PROCEDURE — 99214 PR OFFICE/OUTPT VISIT, EST, LEVL IV, 30-39 MIN: ICD-10-PCS | Mod: S$PBB,,, | Performed by: OBSTETRICS & GYNECOLOGY

## 2021-07-15 PROCEDURE — 87529 HSV DNA AMP PROBE: CPT | Performed by: OBSTETRICS & GYNECOLOGY

## 2021-07-15 PROCEDURE — 99213 OFFICE O/P EST LOW 20 MIN: CPT | Mod: PBBFAC | Performed by: OBSTETRICS & GYNECOLOGY

## 2021-07-15 PROCEDURE — 99999 PR PBB SHADOW E&M-EST. PATIENT-LVL III: CPT | Mod: PBBFAC,,, | Performed by: OBSTETRICS & GYNECOLOGY

## 2021-07-15 PROCEDURE — 87591 N.GONORRHOEAE DNA AMP PROB: CPT | Performed by: OBSTETRICS & GYNECOLOGY

## 2021-07-15 PROCEDURE — 87491 CHLMYD TRACH DNA AMP PROBE: CPT | Performed by: OBSTETRICS & GYNECOLOGY

## 2021-07-15 PROCEDURE — 99214 OFFICE O/P EST MOD 30 MIN: CPT | Mod: S$PBB,,, | Performed by: OBSTETRICS & GYNECOLOGY

## 2021-07-15 RX ORDER — VALACYCLOVIR HYDROCHLORIDE 1 G/1
2000 TABLET, FILM COATED ORAL EVERY 12 HOURS
Qty: 90 TABLET | Refills: 3 | Status: SHIPPED | OUTPATIENT
Start: 2021-07-15 | End: 2022-01-24

## 2021-07-17 LAB
HSV1 DNA SPEC QL NAA+PROBE: NEGATIVE
HSV2 DNA SPEC QL NAA+PROBE: POSITIVE
SPECIMEN SOURCE: ABNORMAL

## 2021-07-20 LAB
C TRACH DNA SPEC QL NAA+PROBE: NOT DETECTED
N GONORRHOEA DNA SPEC QL NAA+PROBE: NOT DETECTED

## 2021-11-30 ENCOUNTER — OFFICE VISIT (OUTPATIENT)
Dept: PSYCHIATRY | Facility: CLINIC | Age: 26
End: 2021-11-30
Payer: MEDICAID

## 2021-11-30 DIAGNOSIS — F90.2 ADHD (ATTENTION DEFICIT HYPERACTIVITY DISORDER), COMBINED TYPE: ICD-10-CM

## 2021-11-30 DIAGNOSIS — F43.10 PTSD (POST-TRAUMATIC STRESS DISORDER): ICD-10-CM

## 2021-11-30 DIAGNOSIS — F43.21 GRIEF AT LOSS OF CHILD: ICD-10-CM

## 2021-11-30 DIAGNOSIS — F33.1 MODERATE EPISODE OF RECURRENT MAJOR DEPRESSIVE DISORDER: ICD-10-CM

## 2021-11-30 DIAGNOSIS — Z63.4 GRIEF AT LOSS OF CHILD: ICD-10-CM

## 2021-11-30 DIAGNOSIS — F41.1 GENERALIZED ANXIETY DISORDER: Primary | ICD-10-CM

## 2021-11-30 PROCEDURE — 90792 PR PSYCHIATRIC DIAGNOSTIC EVALUATION W/MEDICAL SERVICES: ICD-10-PCS | Mod: SA,HB,95, | Performed by: PHYSICIAN ASSISTANT

## 2021-11-30 PROCEDURE — 90792 PSYCH DIAG EVAL W/MED SRVCS: CPT | Mod: SA,HB,95, | Performed by: PHYSICIAN ASSISTANT

## 2021-11-30 RX ORDER — HYDROXYZINE PAMOATE 25 MG/1
25 CAPSULE ORAL DAILY PRN
Qty: 30 CAPSULE | Refills: 1 | Status: SHIPPED | OUTPATIENT
Start: 2021-11-30 | End: 2022-01-27

## 2021-11-30 RX ORDER — LISDEXAMFETAMINE DIMESYLATE 50 MG/1
50 CAPSULE ORAL EVERY MORNING
Qty: 30 CAPSULE | Refills: 0 | Status: SHIPPED | OUTPATIENT
Start: 2021-12-20 | End: 2021-12-29 | Stop reason: SDUPTHER

## 2021-11-30 RX ORDER — ESCITALOPRAM OXALATE 5 MG/1
5 TABLET ORAL DAILY
Qty: 30 TABLET | Refills: 1 | Status: SHIPPED | OUTPATIENT
Start: 2021-11-30 | End: 2022-02-01 | Stop reason: ALTCHOICE

## 2021-11-30 SDOH — SOCIAL DETERMINANTS OF HEALTH (SDOH): DISSAPEARANCE AND DEATH OF FAMILY MEMBER: Z63.4

## 2021-12-22 ENCOUNTER — PATIENT MESSAGE (OUTPATIENT)
Dept: PSYCHIATRY | Facility: CLINIC | Age: 26
End: 2021-12-22
Payer: MEDICAID

## 2021-12-29 ENCOUNTER — OFFICE VISIT (OUTPATIENT)
Dept: PSYCHIATRY | Facility: CLINIC | Age: 26
End: 2021-12-29
Payer: MEDICAID

## 2021-12-29 DIAGNOSIS — F90.2 ADHD (ATTENTION DEFICIT HYPERACTIVITY DISORDER), COMBINED TYPE: ICD-10-CM

## 2021-12-29 DIAGNOSIS — F90.9 ATTENTION DEFICIT HYPERACTIVITY DISORDER (ADHD), UNSPECIFIED ADHD TYPE: ICD-10-CM

## 2021-12-29 DIAGNOSIS — F41.1 GAD (GENERALIZED ANXIETY DISORDER): ICD-10-CM

## 2021-12-29 DIAGNOSIS — F33.1 MODERATE EPISODE OF RECURRENT MAJOR DEPRESSIVE DISORDER: ICD-10-CM

## 2021-12-29 DIAGNOSIS — F43.10 PTSD (POST-TRAUMATIC STRESS DISORDER): Primary | ICD-10-CM

## 2021-12-29 PROCEDURE — 90791 PR PSYCHIATRIC DIAGNOSTIC EVALUATION: ICD-10-PCS | Mod: AH,HB,95, | Performed by: PSYCHOLOGIST

## 2021-12-29 PROCEDURE — 90791 PSYCH DIAGNOSTIC EVALUATION: CPT | Mod: AH,HB,95, | Performed by: PSYCHOLOGIST

## 2021-12-29 RX ORDER — LISDEXAMFETAMINE DIMESYLATE 50 MG/1
50 CAPSULE ORAL EVERY MORNING
Qty: 30 CAPSULE | Refills: 0 | Status: SHIPPED | OUTPATIENT
Start: 2021-12-29 | End: 2022-02-01 | Stop reason: DRUGHIGH
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
## 2022-01-12 ENCOUNTER — OFFICE VISIT (OUTPATIENT)
Dept: BEHAVIORAL HEALTH | Facility: CLINIC | Age: 27
End: 2022-01-12
Payer: MEDICAID

## 2022-01-12 ENCOUNTER — PATIENT MESSAGE (OUTPATIENT)
Dept: BEHAVIORAL HEALTH | Facility: CLINIC | Age: 27
End: 2022-01-12

## 2022-01-12 DIAGNOSIS — F43.10 PTSD (POST-TRAUMATIC STRESS DISORDER): Primary | ICD-10-CM

## 2022-01-12 DIAGNOSIS — F41.1 GAD (GENERALIZED ANXIETY DISORDER): ICD-10-CM

## 2022-01-12 DIAGNOSIS — F90.9 ATTENTION DEFICIT HYPERACTIVITY DISORDER (ADHD), UNSPECIFIED ADHD TYPE: ICD-10-CM

## 2022-01-12 DIAGNOSIS — F33.1 MDD (MAJOR DEPRESSIVE DISORDER), RECURRENT EPISODE, MODERATE: ICD-10-CM

## 2022-01-12 PROCEDURE — 90834 PR PSYCHOTHERAPY W/PATIENT, 45 MIN: ICD-10-PCS | Mod: 95,AH,HB, | Performed by: PSYCHOLOGIST

## 2022-01-12 PROCEDURE — 90834 PSYTX W PT 45 MINUTES: CPT | Mod: 95,AH,HB, | Performed by: PSYCHOLOGIST

## 2022-01-12 NOTE — PROGRESS NOTES
"Individual Psychotherapy (PhD/LCSW)    1/12/2022    The patient location is: Patient's car in [place], [place].  The chief complaint leading to consultation is: ptsd, depression  Visit type: audiovisual  Face to Face time with patient: 45 minutes of total time spent on the encounter, which includes face to face time and non-face to face time preparing to see the patient (eg, review of tests), Obtaining and/or reviewing separately obtained history, Documenting clinical information in the electronic or other health record, Independently interpreting results (not separately reported) and communicating results to the patient/family/caregiver, or Care coordination (not separately reported).   Each patient to whom he or she provides medical services by telemedicine is:  (1) informed of the relationship between the physician and patient and the respective role of any other health care provider with respect to management of the patient; and (2) notified that he or she may decline to receive medical services by telemedicine and may withdraw from such care at any time.       Therapeutic Intervention: Met with patient.  Outpatient - Insight oriented psychotherapy 45 min - CPT code 87935 and Outpatient - Behavior modifying psychotherapy 45 min - CPT code 62475    Chief complaint/reason for encounter: depression, anxiety and trauma symptoms     Interval history and content of current session: Patient was timely for her individual therapy session via video visit. She reported feeling about the same as our intake session, not much has changed, except for her talking to a new mikal. She has not met him in person yet. She is questioning why he would be interested in her as he is "very good looking" and is down her her looks lately. We discussed how the negative thoughts about herself and other people will be a focus of the trauma-focused therapy we are starting today. Today's session was session 1 of Cognitive Processing Therapy " "(CPT), which consisted of psychoeducation of PTSD symptoms and how PTSD develops (ie., avoidance, fight or flight or freeze, negative beliefs/stuck points). The rationale for the therapy was presented as well as the techniques that will be used throughout the therapy. We also went through an overview of what to expect from the treatment sessions. Lastly, her homework assignment of the impact statement was introduced and potential barriers to completion were discussed.    Treatment plan:  · Target symptoms: depression, anxiety , trauma-related symptoms  · Why chosen therapy is appropriate versus another modality: relevant to diagnosis, evidence based practice  · Outcome monitoring methods: self-report, observation  · Therapeutic intervention type: insight oriented psychotherapy, behavior modifying psychotherapy    Risk parameters:  Patient reports no suicidal ideation  Patient reports no homicidal ideation  Patient reports no self-injurious behavior  Patient reports no violent behavior    Verbal deficits: None    Patient's response to intervention:  The patient's response to intervention is accepting.    Patient-Stated Treatment Goals: "I just want to be able to deal with my emotions when thinkgs are happening. don't want to be angry at her for no reason. Tired of the little things making me very angry. I dont want to feel numb anymore. i'm not happy and not like myself."    Progress toward goals and other mental status changes:  The patient's progress toward goals is limited.    Mental Status Exam:  General Appearance:  unremarkable, age appropriate   Speech: normal tone, normal rate, normal pitch, normal volume      Level of Cooperation: cooperative      Thought Processes: normal and logical   Mood: dysthymic      Thought Content: normal, no suicidality, no homicidality, delusions, or paranoia   Affect: blunted   Orientation: Oriented x3   Attention Span & Concentration: intact   Judgment & Insight: fair   "   Language  intact     Diagnosis:     ICD-10-CM ICD-9-CM   1. PTSD (post-traumatic stress disorder)  F43.10 309.81   2. MDD (major depressive disorder), recurrent episode, moderate  F33.1 296.32   3. XIOMARA (generalized anxiety disorder)  F41.1 300.02   4. Attention deficit hyperactivity disorder (ADHD), unspecified ADHD type  F90.9 314.01     Plan:  individual psychotherapy and medication management by physician    Return to clinic: 1 week    Length of Service (minutes): 45

## 2022-01-19 ENCOUNTER — OFFICE VISIT (OUTPATIENT)
Dept: BEHAVIORAL HEALTH | Facility: CLINIC | Age: 27
End: 2022-01-19
Payer: MEDICAID

## 2022-01-19 DIAGNOSIS — F43.10 PTSD (POST-TRAUMATIC STRESS DISORDER): Primary | ICD-10-CM

## 2022-01-19 DIAGNOSIS — F90.9 ATTENTION DEFICIT HYPERACTIVITY DISORDER (ADHD), UNSPECIFIED ADHD TYPE: ICD-10-CM

## 2022-01-19 DIAGNOSIS — F41.1 GAD (GENERALIZED ANXIETY DISORDER): ICD-10-CM

## 2022-01-19 DIAGNOSIS — F33.1 MDD (MAJOR DEPRESSIVE DISORDER), RECURRENT EPISODE, MODERATE: ICD-10-CM

## 2022-01-19 PROCEDURE — 90834 PR PSYCHOTHERAPY W/PATIENT, 45 MIN: ICD-10-PCS | Mod: 95,AH,HB, | Performed by: PSYCHOLOGIST

## 2022-01-19 PROCEDURE — 90834 PSYTX W PT 45 MINUTES: CPT | Mod: 95,AH,HB, | Performed by: PSYCHOLOGIST

## 2022-01-20 ENCOUNTER — PATIENT MESSAGE (OUTPATIENT)
Dept: BEHAVIORAL HEALTH | Facility: CLINIC | Age: 27
End: 2022-01-20
Payer: MEDICAID

## 2022-01-20 NOTE — PROGRESS NOTES
Individual Psychotherapy (PhD/LCSW)    1/19/2022    The patient location is: Patient's car in [place], [place].  The chief complaint leading to consultation is: ptsd, depression  Visit type: audiovisual  Face to Face time with patient: 45 minutes of total time spent on the encounter, which includes face to face time and non-face to face time preparing to see the patient (eg, review of tests), Obtaining and/or reviewing separately obtained history, Documenting clinical information in the electronic or other health record, Independently interpreting results (not separately reported) and communicating results to the patient/family/caregiver, or Care coordination (not separately reported).   Each patient to whom he or she provides medical services by telemedicine is:  (1) informed of the relationship between the physician and patient and the respective role of any other health care provider with respect to management of the patient; and (2) notified that he or she may decline to receive medical services by telemedicine and may withdraw from such care at any time.       Therapeutic Intervention: Met with patient.  Outpatient - Insight oriented psychotherapy 45 min - CPT code 89152 and Outpatient - Behavior modifying psychotherapy 45 min - CPT code 63807    Chief complaint/reason for encounter: depression, anxiety and trauma symptoms     Interval history and content of current session: Patient was timely for her individual therapy session via video visit. She reported feeling confused, mostly related to relationship issues. She has been talking to two different men, one only over text and the other is a coworker with a girlfriend. We discussed her boundaries, what is healthy for her, and what advice she would give a friend in such a situation. Then we moved onto the content of Session 2 of CPT. She completed the impact statement which she read aloud in session, and we began developing her stuck point log. Then the ABC  "worksheet was introduced as a tool to increase her awareness of how her thoughts impact her emotions and behaviors. She will complete ABC worksheets for homework.    Treatment plan:  · Target symptoms: depression, anxiety , trauma-related symptoms  · Why chosen therapy is appropriate versus another modality: relevant to diagnosis, evidence based practice  · Outcome monitoring methods: self-report, observation  · Therapeutic intervention type: insight oriented psychotherapy, behavior modifying psychotherapy    Risk parameters:  Patient reports no suicidal ideation  Patient reports no homicidal ideation  Patient reports no self-injurious behavior  Patient reports no violent behavior    Verbal deficits: None    Patient's response to intervention:  The patient's response to intervention is accepting.    Patient-Stated Treatment Goals: "I just want to be able to deal with my emotions when thinkgs are happening. don't want to be angry at her for no reason. Tired of the little things making me very angry. I dont want to feel numb anymore. i'm not happy and not like myself."    Progress toward goals and other mental status changes:  The patient's progress toward goals is limited.    Mental Status Exam:  General Appearance:  unremarkable, age appropriate   Speech: normal tone, normal rate, normal pitch, normal volume      Level of Cooperation: cooperative      Thought Processes: normal and logical   Mood: dysthymic      Thought Content: normal, no suicidality, no homicidality, delusions, or paranoia   Affect: blunted   Orientation: Oriented x3   Attention Span & Concentration: intact   Judgment & Insight: fair     Language  intact     Diagnosis:     ICD-10-CM ICD-9-CM   1. PTSD (post-traumatic stress disorder)  F43.10 309.81   2. MDD (major depressive disorder), recurrent episode, moderate  F33.1 296.32   3. XIOMARA (generalized anxiety disorder)  F41.1 300.02   4. Attention deficit hyperactivity disorder (ADHD), unspecified " ADHD type  F90.9 314.01     Plan:  individual psychotherapy and medication management by physician    Return to clinic: 1 week    Length of Service (minutes): 45

## 2022-01-26 ENCOUNTER — OFFICE VISIT (OUTPATIENT)
Dept: BEHAVIORAL HEALTH | Facility: CLINIC | Age: 27
End: 2022-01-26
Payer: MEDICAID

## 2022-01-26 ENCOUNTER — PATIENT MESSAGE (OUTPATIENT)
Dept: BEHAVIORAL HEALTH | Facility: CLINIC | Age: 27
End: 2022-01-26

## 2022-01-26 DIAGNOSIS — F90.9 ATTENTION DEFICIT HYPERACTIVITY DISORDER (ADHD), UNSPECIFIED ADHD TYPE: ICD-10-CM

## 2022-01-26 DIAGNOSIS — F33.1 MDD (MAJOR DEPRESSIVE DISORDER), RECURRENT EPISODE, MODERATE: ICD-10-CM

## 2022-01-26 DIAGNOSIS — F43.10 PTSD (POST-TRAUMATIC STRESS DISORDER): Primary | ICD-10-CM

## 2022-01-26 PROCEDURE — 90834 PR PSYCHOTHERAPY W/PATIENT, 45 MIN: ICD-10-PCS | Mod: 95,AH,HB, | Performed by: PSYCHOLOGIST

## 2022-01-26 PROCEDURE — 90834 PSYTX W PT 45 MINUTES: CPT | Mod: 95,AH,HB, | Performed by: PSYCHOLOGIST

## 2022-01-26 NOTE — PROGRESS NOTES
Individual Psychotherapy (PhD/LCSW)    1/26/2022    The patient location is: Patient's car in .  The chief complaint leading to consultation is: ptsd, depression  Visit type: audiovisual  Face to Face time with patient: 45 minutes of total time spent on the encounter, which includes face to face time and non-face to face time preparing to see the patient (eg, review of tests), Obtaining and/or reviewing separately obtained history, Documenting clinical information in the electronic or other health record, Independently interpreting results (not separately reported) and communicating results to the patient/family/caregiver, or Care coordination (not separately reported).   Each patient to whom he or she provides medical services by telemedicine is:  (1) informed of the relationship between the physician and patient and the respective role of any other health care provider with respect to management of the patient; and (2) notified that he or she may decline to receive medical services by telemedicine and may withdraw from such care at any time.       Therapeutic Intervention: Met with patient.  Outpatient - Insight oriented psychotherapy 45 min - CPT code 49002 and Outpatient - Behavior modifying psychotherapy 45 min - CPT code 86230    Chief complaint/reason for encounter: depression, anxiety and trauma symptoms     Interval history and content of current session: Patient was timely for her individual therapy session via video visit. She completed two ABC worksheets for homework which were reviewed in session. The Challenging Questions Worksheet was introduced in session, and we worked through one of her stuck points related to self-blame to demonstrate how to complete the worksheet. She appeared to understand the worksheet moderately well. She will complete more for homework using stuck points from her stuck point log. Lastly she shared recent stressful situation at home with her mother and  "stepfather and indicated she is trying hard to financially afford to move out of her mother's home due to their toxic relationship.    Treatment plan:  · Target symptoms: depression, anxiety , trauma-related symptoms  · Why chosen therapy is appropriate versus another modality: relevant to diagnosis, evidence based practice  · Outcome monitoring methods: self-report, observation  · Therapeutic intervention type: insight oriented psychotherapy, behavior modifying psychotherapy    Risk parameters:  Patient reports no suicidal ideation  Patient reports no homicidal ideation  Patient reports no self-injurious behavior  Patient reports no violent behavior    Verbal deficits: None    Patient's response to intervention:  The patient's response to intervention is accepting.    Patient-Stated Treatment Goals: "I just want to be able to deal with my emotions when thinkgs are happening. don't want to be angry at her for no reason. Tired of the little things making me very angry. I dont want to feel numb anymore. i'm not happy and not like myself."    Progress toward goals and other mental status changes:  The patient's progress toward goals is limited.    Mental Status Exam:  General Appearance:  unremarkable, age appropriate   Speech: normal tone, normal rate, normal pitch, normal volume      Level of Cooperation: cooperative      Thought Processes: normal and logical   Mood: dysthymic      Thought Content: normal, no suicidality, no homicidality, delusions, or paranoia   Affect: blunted   Orientation: Oriented x3   Attention Span & Concentration: intact   Judgment & Insight: fair     Language  intact     Diagnosis:     ICD-10-CM ICD-9-CM   1. PTSD (post-traumatic stress disorder)  F43.10 309.81   2. MDD (major depressive disorder), recurrent episode, moderate  F33.1 296.32   3. Attention deficit hyperactivity disorder (ADHD), unspecified ADHD type  F90.9 314.01     Plan:  individual psychotherapy and medication management by " physician    Return to clinic: 1 week    Length of Service (minutes): 45

## 2022-02-01 ENCOUNTER — OFFICE VISIT (OUTPATIENT)
Dept: PSYCHIATRY | Facility: CLINIC | Age: 27
End: 2022-02-01
Payer: MEDICAID

## 2022-02-01 DIAGNOSIS — Z63.4 GRIEF AT LOSS OF CHILD: ICD-10-CM

## 2022-02-01 DIAGNOSIS — F43.10 PTSD (POST-TRAUMATIC STRESS DISORDER): ICD-10-CM

## 2022-02-01 DIAGNOSIS — F90.2 ADHD (ATTENTION DEFICIT HYPERACTIVITY DISORDER), COMBINED TYPE: Primary | ICD-10-CM

## 2022-02-01 DIAGNOSIS — F33.1 MODERATE EPISODE OF RECURRENT MAJOR DEPRESSIVE DISORDER: ICD-10-CM

## 2022-02-01 DIAGNOSIS — F41.1 GENERALIZED ANXIETY DISORDER: ICD-10-CM

## 2022-02-01 DIAGNOSIS — F43.21 GRIEF AT LOSS OF CHILD: ICD-10-CM

## 2022-02-01 PROCEDURE — 99214 OFFICE O/P EST MOD 30 MIN: CPT | Mod: SA,HB,95, | Performed by: PHYSICIAN ASSISTANT

## 2022-02-01 PROCEDURE — 1159F MED LIST DOCD IN RCRD: CPT | Mod: SA,HB,CPTII,95 | Performed by: PHYSICIAN ASSISTANT

## 2022-02-01 PROCEDURE — 99214 PR OFFICE/OUTPT VISIT, EST, LEVL IV, 30-39 MIN: ICD-10-PCS | Mod: SA,HB,95, | Performed by: PHYSICIAN ASSISTANT

## 2022-02-01 PROCEDURE — 1160F RVW MEDS BY RX/DR IN RCRD: CPT | Mod: SA,HB,CPTII,95 | Performed by: PHYSICIAN ASSISTANT

## 2022-02-01 PROCEDURE — 1159F PR MEDICATION LIST DOCUMENTED IN MEDICAL RECORD: ICD-10-PCS | Mod: SA,HB,CPTII,95 | Performed by: PHYSICIAN ASSISTANT

## 2022-02-01 PROCEDURE — 1160F PR REVIEW ALL MEDS BY PRESCRIBER/CLIN PHARMACIST DOCUMENTED: ICD-10-PCS | Mod: SA,HB,CPTII,95 | Performed by: PHYSICIAN ASSISTANT

## 2022-02-01 RX ORDER — LISDEXAMFETAMINE DIMESYLATE 60 MG/1
60 CAPSULE ORAL EVERY MORNING
Qty: 30 CAPSULE | Refills: 0 | Status: SHIPPED | OUTPATIENT
Start: 2022-02-01 | End: 2022-03-28 | Stop reason: DRUGHIGH

## 2022-02-01 RX ORDER — LISDEXAMFETAMINE DIMESYLATE 60 MG/1
60 CAPSULE ORAL EVERY MORNING
Qty: 30 CAPSULE | Refills: 0 | Status: SHIPPED | OUTPATIENT
Start: 2022-02-01 | End: 2022-02-01 | Stop reason: SDUPTHER

## 2022-02-01 RX ORDER — VENLAFAXINE HYDROCHLORIDE 75 MG/1
75 CAPSULE, EXTENDED RELEASE ORAL DAILY
Qty: 30 CAPSULE | Refills: 1 | Status: SHIPPED | OUTPATIENT
Start: 2022-02-01 | End: 2022-03-28 | Stop reason: SDUPTHER

## 2022-02-01 RX ORDER — HYDROXYZINE PAMOATE 25 MG/1
25 CAPSULE ORAL 3 TIMES DAILY
Qty: 90 CAPSULE | Refills: 1 | Status: SHIPPED | OUTPATIENT
Start: 2022-02-01 | End: 2022-03-28 | Stop reason: SDUPTHER

## 2022-02-01 SDOH — SOCIAL DETERMINANTS OF HEALTH (SDOH): DISSAPEARANCE AND DEATH OF FAMILY MEMBER: Z63.4

## 2022-02-01 NOTE — PROGRESS NOTES
The patient location is: car, at work, [place], [place]  The chief complaint leading to consultation is: follow up    Visit type: audiovisual    Face to Face time with patient: 25  30 minutes of total time spent on the encounter, which includes face to face time and non-face to face time preparing to see the patient (eg, review of tests), Obtaining and/or reviewing separately obtained history, Documenting clinical information in the electronic or other health record, Independently interpreting results (not separately reported) and communicating results to the patient/family/caregiver, or Care coordination (not separately reported).         Each patient to whom he or she provides medical services by telemedicine is:  (1) informed of the relationship between the physician and patient and the respective role of any other health care provider with respect to management of the patient; and (2) notified that he or she may decline to receive medical services by telemedicine and may withdraw from such care at any time.    Notes:     Outpatient Psychiatry Follow-Up Visit ([facility])    2/1/2022    Clinical Status of Patient:  Outpatient (Ambulatory)    Chief Complaint:  Nina Montesinos is a 26 y.o. female who presents today for follow-up of depression, anxiety and attention problems.  Met with patient.      Current Medications:  Vyvanse 50 mg  Lexapro 5 mg  Hydroxyzine 25 mg    Interval History and Content of Current Session:  Interim Events/Subjective Report/Content of Current Session:     Patient is a 26 year old female with a psychiatric history of depression, anxiety, PTSD, ADHD, and grief after losing a child. Patient is currently taking Vyvanse 50 mg, Lexapro 5 mg, and Hydroxyzine 25 mg.     Patient reports stating that she has not noticed much improvement in her mood since starting lexapro 5 mg, stating she is pretty much the same. She did not want to start lexapro 10 mg due to history of emotional numbing. Patient  "reports that therapy has been helpful, but that "each day is hit or miss." She describes her mood as "not great, not horrible" and she continues to participate in her and her daughter's daily life but continues to endorse lack of motivation, decreased energy, and anhedonia.     The patient states that her anxiety has been "horrible" and that she continues to have panic attacks. She feels a lot of her anxiety is related to living at home with her mother. She reports that the hydroxyzine is working well when she takes it when feeling panicky. She states some days she takes it multiple times a day while some days she doesn't take it at all.     The patient reports that her ADHD medication, Vyvanse 50 mg, is no longer effective. She states that she can no longer tell the difference between the days she takes it and the days she does not, stating "I literally don't feel it anymore, I can't tell when I took it or not and sometimes I don't remember." She has been on Vyvanse 50 mg for a few months but prior to that she was taking Vyvanse 40 mg and 10 mg at the same time.     Patient reports that her sleep has been poor. She is not falling asleep until 12 and has to wake up at 4 am for work. She reports that this does not occur nightly but most nights and has been going on for the past month.     She reports she has been binge eating lately and feels like eating 24/7. She states feeling obsessed with food even when she's not hungry.     The patient denies SI/HI and is working on PTSD symptoms in therapy.       Psychotherapy:  · Target symptoms: depression, distractability, lack of focus, anxiety   · Why chosen therapy is appropriate versus another modality: relevant to diagnosis  · Outcome monitoring methods: self-report  · Therapeutic intervention type: supportive psychotherapy  · Topics discussed/themes: work stress, building skills sets for symptom management, symptom recognition  · The patient's response to the " intervention is accepting. The patient's progress toward treatment goals is fair.   · Duration of intervention: 16 minutes.    Review of Systems   · PSYCHIATRIC: Pertinant items are noted in the narrative.    Past Medical, Family and Social History: The patient's past medical, family and social history have been reviewed and updated as appropriate within the electronic medical record - see encounter notes.    Compliance: yes    Side effects: None    Risk Parameters:  Patient reports no suicidal ideation  Patient reports no homicidal ideation  Patient reports no self-injurious behavior  Patient reports no violent behavior    Exam (detailed: at least 9 elements; comprehensive: all 15 elements)   Constitutional  Vitals:  Most recent vital signs, dated greater than 90 days prior to this appointment, were reviewed.   There were no vitals filed for this visit.     General:  unremarkable, age appropriate, casually dressed     Musculoskeletal  Muscle Strength/Tone:  not examined   Gait & Station:  n/a, virtual appt     Psychiatric  Speech:  no latency; no press   Mood & Affect:  steady, dysthymic  congruent and appropriate   Thought Process:  normal and logical   Associations:  intact   Thought Content:  normal, no suicidality, no homicidality, delusions, or paranoia   Insight:  intact   Judgement: behavior is adequate to circumstances   Orientation:  grossly intact   Memory: intact for content of interview   Language: grossly intact   Attention Span & Concentration:  able to focus   Fund of Knowledge:  intact and appropriate to age and level of education     Assessment and Diagnosis   Status/Progress: Based on the examination today, the patient's problem(s) is/are inadequately controlled.  New problems have been presented today.   Lack of compliance are not complicating management of the primary condition.  There are no active rule-out diagnoses for this patient at this time.     General Impression: Patient is a 26 year  old female with a psychiatric history of XIOMARA, MDD, ADHD, PTSD, and grief after loss of a child. Patient reports that her Vyvanse 50 mg is no longer working. She states that Lexapro 5 mg was ineffective by 10 mg had adverse effects. Patient is amenable to starting Effexor 75 mg daily.       ICD-10-CM ICD-9-CM   1. ADHD (attention deficit hyperactivity disorder), combined type  F90.2 314.01   2. Generalized anxiety disorder  F41.1 300.02   3. Moderate episode of recurrent major depressive disorder  F33.1 296.32   4. Grief at loss of child  F43.21 309.0    Z63.4    5. PTSD (post-traumatic stress disorder)  F43.10 309.81       Intervention/Counseling/Treatment Plan   · Medication Management: The risks and benefits of medication were discussed with the patient.   · Stop Lexapro 5 mg  · Start Effexor 75 mg daily  · Continue hydroxyzine 25 mg prn for anxiety  · Increase Vyvanse to 60 mg daily  · Vyvanse prescription printed and left at  per patient's request  Discussed with patient informed consent, risks vs. benefits, alternative treatments, side effect profile and the inherent unpredictability of individual responses to these treatments. The patient expresses understanding of the above and displays the capacity to agree with this current plan and had no other questions.  Encouraged patient to keep future appointments.   Encouraged patient to message or call with questions or concerns  Safety plan reviewed with patient for worsening condition or suicidal ideations. In the event of an emergency patient was advised to go to the emergency room.      Return to Clinic: 1 month

## 2022-02-03 ENCOUNTER — TELEPHONE (OUTPATIENT)
Dept: PSYCHIATRY | Facility: HOSPITAL | Age: 27
End: 2022-02-03
Payer: MEDICAID

## 2022-02-03 ENCOUNTER — PATIENT MESSAGE (OUTPATIENT)
Dept: PSYCHIATRY | Facility: CLINIC | Age: 27
End: 2022-02-03
Payer: MEDICAID

## 2022-03-28 ENCOUNTER — OFFICE VISIT (OUTPATIENT)
Dept: PSYCHIATRY | Facility: CLINIC | Age: 27
End: 2022-03-28
Payer: MEDICAID

## 2022-03-28 DIAGNOSIS — F41.1 GENERALIZED ANXIETY DISORDER: ICD-10-CM

## 2022-03-28 DIAGNOSIS — Z63.4 GRIEF AT LOSS OF CHILD: ICD-10-CM

## 2022-03-28 DIAGNOSIS — F90.2 ADHD (ATTENTION DEFICIT HYPERACTIVITY DISORDER), COMBINED TYPE: Primary | ICD-10-CM

## 2022-03-28 DIAGNOSIS — F43.21 GRIEF AT LOSS OF CHILD: ICD-10-CM

## 2022-03-28 DIAGNOSIS — F33.1 MODERATE EPISODE OF RECURRENT MAJOR DEPRESSIVE DISORDER: ICD-10-CM

## 2022-03-28 PROCEDURE — 1159F PR MEDICATION LIST DOCUMENTED IN MEDICAL RECORD: ICD-10-PCS | Mod: SA,HB,CPTII,95 | Performed by: PHYSICIAN ASSISTANT

## 2022-03-28 PROCEDURE — 1160F PR REVIEW ALL MEDS BY PRESCRIBER/CLIN PHARMACIST DOCUMENTED: ICD-10-PCS | Mod: SA,HB,CPTII,95 | Performed by: PHYSICIAN ASSISTANT

## 2022-03-28 PROCEDURE — 1160F RVW MEDS BY RX/DR IN RCRD: CPT | Mod: SA,HB,CPTII,95 | Performed by: PHYSICIAN ASSISTANT

## 2022-03-28 PROCEDURE — 1159F MED LIST DOCD IN RCRD: CPT | Mod: SA,HB,CPTII,95 | Performed by: PHYSICIAN ASSISTANT

## 2022-03-28 PROCEDURE — 99214 OFFICE O/P EST MOD 30 MIN: CPT | Mod: SA,HB,95, | Performed by: PHYSICIAN ASSISTANT

## 2022-03-28 PROCEDURE — 99214 PR OFFICE/OUTPT VISIT, EST, LEVL IV, 30-39 MIN: ICD-10-PCS | Mod: SA,HB,95, | Performed by: PHYSICIAN ASSISTANT

## 2022-03-28 RX ORDER — VENLAFAXINE HYDROCHLORIDE 75 MG/1
75 CAPSULE, EXTENDED RELEASE ORAL DAILY
Qty: 30 CAPSULE | Refills: 1 | Status: SHIPPED | OUTPATIENT
Start: 2022-03-28 | End: 2022-04-29 | Stop reason: SDUPTHER

## 2022-03-28 RX ORDER — HYDROXYZINE PAMOATE 25 MG/1
25 CAPSULE ORAL 3 TIMES DAILY
Qty: 90 CAPSULE | Refills: 1 | Status: SHIPPED | OUTPATIENT
Start: 2022-03-28 | End: 2022-04-29 | Stop reason: SDUPTHER

## 2022-03-28 SDOH — SOCIAL DETERMINANTS OF HEALTH (SDOH): DISSAPEARANCE AND DEATH OF FAMILY MEMBER: Z63.4

## 2022-03-28 NOTE — PROGRESS NOTES
The patient location is: car, at work, [place], [place]  The chief complaint leading to consultation is: follow up    Visit type: audiovisual    Face to Face time with patient: 25  30 minutes of total time spent on the encounter, which includes face to face time and non-face to face time preparing to see the patient (eg, review of tests), Obtaining and/or reviewing separately obtained history, Documenting clinical information in the electronic or other health record, Independently interpreting results (not separately reported) and communicating results to the patient/family/caregiver, or Care coordination (not separately reported).         Each patient to whom he or she provides medical services by telemedicine is:  (1) informed of the relationship between the physician and patient and the respective role of any other health care provider with respect to management of the patient; and (2) notified that he or she may decline to receive medical services by telemedicine and may withdraw from such care at any time.    Notes:     Outpatient Psychiatry Follow-Up Visit ([facility])    3/28/2022    Clinical Status of Patient:  Outpatient (Ambulatory)    Chief Complaint:  Nina Montesinos is a 26 y.o. female who presents today for follow-up of depression, anxiety and attention problems.  Met with patient.      Current Medications:  Vyvanse 60 mg  Effexor 75 mg  Hydroxyzine 25 mg    Interval History and Content of Current Session:  Interim Events/Subjective Report/Content of Current Session:     Patient is 10 minutes late to appointment    Patient is a 26 year old female with a psychiatric history of depression, anxiety, PTSD, ADHD, and grief after losing a child. Patient is currently taking Vyvanse 50 mg, Lexapro 5 mg, and Hydroxyzine 25 mg.     Patient reports after discontinuing lexapro and starting Effexor 75 mg. Patient reports that she just recently started the effexor, as she did not pick the medication up until recently.  "She reports that after losing the written prescription for vyvnase, she went "into a really bad slump." She reports she has been taking the medication for about 1-1.5 weeks. She denies any adverse effects. Patient is not wanting to increase to 150 mg at this time.     The patient continues to feel she is in a slump, as it is getting close tot he one year po of her daughter's death. She describes her mood as "not great, not horrible."  She continues to endorse anxiety, stating that the extent of the anxiety depends on the day. She reports less frequent panic attacks at this time.     The patient reports she is still binge eating almost every night. She reports no benefit with vyvanse 60 mg. The patient is taking the vyvanse during with week with work but takes the weekends off.     The patient reports she isn't sleeping well. She reports that she sleeps poorly whether she takes the vyvanse or not and does not feel the medication is related. She reports she sleeps well when she remembers to take the hydroxyzine but does not remember to take the hydroxyzine regularly.     The patient enrolled in the [facility] clinic but stopped going after one visit. She reports her mother was picking on her triggers and felt the trauma work at the moment wouldn't be good as she is still living in the household with triggers.   She is supposed to see her therapist weekly but hasn't seen her in a few weeks.     She denies SI/HI/AVH    Psychotherapy:  · Target symptoms: depression, distractability, lack of focus, anxiety   · Why chosen therapy is appropriate versus another modality: relevant to diagnosis  · Outcome monitoring methods: self-report  · Therapeutic intervention type: supportive psychotherapy  · Topics discussed/themes: work stress, building skills sets for symptom management, symptom recognition  · The patient's response to the intervention is accepting. The patient's progress toward treatment goals is limited. "   · Duration of intervention: 10 minutes.    Review of Systems   · PSYCHIATRIC: Pertinant items are noted in the narrative.    Past Medical, Family and Social History: The patient's past medical, family and social history have been reviewed and updated as appropriate within the electronic medical record - see encounter notes.    Compliance: yes    Side effects: None    Risk Parameters:  Patient reports no suicidal ideation  Patient reports no homicidal ideation  Patient reports no self-injurious behavior  Patient reports no violent behavior    Exam (detailed: at least 9 elements; comprehensive: all 15 elements)   Constitutional  Vitals:  Most recent vital signs, dated greater than 90 days prior to this appointment, were reviewed.   There were no vitals filed for this visit.     General:  unremarkable, age appropriate, casually dressed     Musculoskeletal  Muscle Strength/Tone:  not examined   Gait & Station:  n/a, virtual appt     Psychiatric  Speech:  no latency; no press   Mood & Affect:  steady, dysthymic  congruent and appropriate   Thought Process:  normal and logical   Associations:  intact   Thought Content:  normal, no suicidality, no homicidality, delusions, or paranoia   Insight:  intact   Judgement: behavior is adequate to circumstances   Orientation:  grossly intact   Memory: intact for content of interview   Language: grossly intact   Attention Span & Concentration:  able to focus   Fund of Knowledge:  intact and appropriate to age and level of education     Assessment and Diagnosis   Status/Progress: Based on the examination today, the patient's problem(s) is/are inadequately controlled.  New problems have not been presented today.   Lack of compliance are complicating management of the primary condition.  There are no active rule-out diagnoses for this patient at this time.     General Impression: Patient is a 26 year old female with a psychiatric history of XIOMARA, MDD, ADHD, PTSD, and grief after loss  of a child. Patient reports that her Vyvanse 60 mg is not effective for attention or binge eating. She only recently started Effexor 75 mg.      ICD-10-CM ICD-9-CM   1. ADHD (attention deficit hyperactivity disorder), combined type  F90.2 314.01   2. Generalized anxiety disorder  F41.1 300.02   3. Moderate episode of recurrent major depressive disorder  F33.1 296.32   4. Grief at loss of child  F43.21 309.0    Z63.4        Intervention/Counseling/Treatment Plan   · Medication Management: The risks and benefits of medication were discussed with the patient.   · Continue Effexor 75 mg daily  · Start hydroxyzine 25 mg nightly  · Trial Vyvanse 70 mg for binge eating  · Vyvanse prescription printed and left at  per patient's request  Discussed with patient informed consent, risks vs. benefits, alternative treatments, side effect profile and the inherent unpredictability of individual responses to these treatments. The patient expresses understanding of the above and displays the capacity to agree with this current plan and had no other questions.  Encouraged patient to keep future appointments.   Encouraged patient to message or call with questions or concerns  Safety plan reviewed with patient for worsening condition or suicidal ideations. In the event of an emergency patient was advised to go to the emergency room.      Return to Clinic: 1 month

## 2022-03-29 RX ORDER — LISDEXAMFETAMINE DIMESYLATE 70 MG/1
70 CAPSULE ORAL EVERY MORNING
Qty: 30 CAPSULE | Refills: 0 | Status: SHIPPED | OUTPATIENT
Start: 2022-03-29 | End: 2022-04-29 | Stop reason: SDUPTHER

## 2022-03-30 ENCOUNTER — PATIENT MESSAGE (OUTPATIENT)
Dept: PSYCHIATRY | Facility: CLINIC | Age: 27
End: 2022-03-30
Payer: MEDICAID

## 2022-04-29 ENCOUNTER — OFFICE VISIT (OUTPATIENT)
Dept: PSYCHIATRY | Facility: CLINIC | Age: 27
End: 2022-04-29
Payer: MEDICAID

## 2022-04-29 DIAGNOSIS — F90.2 ADHD (ATTENTION DEFICIT HYPERACTIVITY DISORDER), COMBINED TYPE: Primary | ICD-10-CM

## 2022-04-29 DIAGNOSIS — F41.1 GENERALIZED ANXIETY DISORDER: ICD-10-CM

## 2022-04-29 PROCEDURE — 99214 OFFICE O/P EST MOD 30 MIN: CPT | Mod: SA,HB,95, | Performed by: PHYSICIAN ASSISTANT

## 2022-04-29 PROCEDURE — 1159F MED LIST DOCD IN RCRD: CPT | Mod: SA,HB,CPTII,95 | Performed by: PHYSICIAN ASSISTANT

## 2022-04-29 PROCEDURE — 1160F PR REVIEW ALL MEDS BY PRESCRIBER/CLIN PHARMACIST DOCUMENTED: ICD-10-PCS | Mod: SA,HB,CPTII,95 | Performed by: PHYSICIAN ASSISTANT

## 2022-04-29 PROCEDURE — 1159F PR MEDICATION LIST DOCUMENTED IN MEDICAL RECORD: ICD-10-PCS | Mod: SA,HB,CPTII,95 | Performed by: PHYSICIAN ASSISTANT

## 2022-04-29 PROCEDURE — 1160F RVW MEDS BY RX/DR IN RCRD: CPT | Mod: SA,HB,CPTII,95 | Performed by: PHYSICIAN ASSISTANT

## 2022-04-29 PROCEDURE — 99214 PR OFFICE/OUTPT VISIT, EST, LEVL IV, 30-39 MIN: ICD-10-PCS | Mod: SA,HB,95, | Performed by: PHYSICIAN ASSISTANT

## 2022-04-29 RX ORDER — LISDEXAMFETAMINE DIMESYLATE 70 MG/1
70 CAPSULE ORAL EVERY MORNING
Qty: 30 CAPSULE | Refills: 0 | Status: SHIPPED | OUTPATIENT
Start: 2022-04-29 | End: 2022-07-14 | Stop reason: SDUPTHER

## 2022-04-29 RX ORDER — LISDEXAMFETAMINE DIMESYLATE 70 MG/1
70 CAPSULE ORAL EVERY MORNING
Qty: 70 CAPSULE | Refills: 0 | Status: SHIPPED | OUTPATIENT
Start: 2022-04-29 | End: 2022-04-29 | Stop reason: CLARIF

## 2022-04-29 RX ORDER — VENLAFAXINE HYDROCHLORIDE 75 MG/1
75 CAPSULE, EXTENDED RELEASE ORAL DAILY
Qty: 30 CAPSULE | Refills: 1 | Status: SHIPPED | OUTPATIENT
Start: 2022-04-29 | End: 2022-09-16 | Stop reason: DRUGHIGH

## 2022-04-29 RX ORDER — LISDEXAMFETAMINE DIMESYLATE 70 MG/1
70 CAPSULE ORAL EVERY MORNING
Qty: 30 CAPSULE | Refills: 0 | Status: SHIPPED | OUTPATIENT
Start: 2022-04-29 | End: 2022-04-29 | Stop reason: CLARIF

## 2022-04-29 RX ORDER — HYDROXYZINE PAMOATE 25 MG/1
25 CAPSULE ORAL DAILY PRN
Qty: 90 CAPSULE | Refills: 0 | Status: SHIPPED | OUTPATIENT
Start: 2022-04-29 | End: 2022-07-28

## 2022-04-29 RX ORDER — LISDEXAMFETAMINE DIMESYLATE 70 MG/1
70 CAPSULE ORAL EVERY MORNING
Qty: 30 CAPSULE | Refills: 0 | Status: SHIPPED | OUTPATIENT
Start: 2022-05-29 | End: 2022-09-16 | Stop reason: SDUPTHER

## 2022-04-29 RX ORDER — LISDEXAMFETAMINE DIMESYLATE 70 MG/1
70 CAPSULE ORAL EVERY MORNING
Qty: 30 CAPSULE | Refills: 0 | Status: SHIPPED | OUTPATIENT
Start: 2022-06-29 | End: 2022-07-13 | Stop reason: SDUPTHER

## 2022-04-29 NOTE — PROGRESS NOTES
The patient location is: car, at work, [place], [place]  The chief complaint leading to consultation is: follow up    Visit type: audiovisual    Face to Face time with patient: 25  30 minutes of total time spent on the encounter, which includes face to face time and non-face to face time preparing to see the patient (eg, review of tests), Obtaining and/or reviewing separately obtained history, Documenting clinical information in the electronic or other health record, Independently interpreting results (not separately reported) and communicating results to the patient/family/caregiver, or Care coordination (not separately reported).     Each patient to whom he or she provides medical services by telemedicine is:  (1) informed of the relationship between the physician and patient and the respective role of any other health care provider with respect to management of the patient; and (2) notified that he or she may decline to receive medical services by telemedicine and may withdraw from such care at any time.    Notes:     Outpatient Psychiatry Follow-Up Visit ([facility])    4/29/2022    Clinical Status of Patient:  Outpatient (Ambulatory)    Chief Complaint:  Nina Montesinos is a 26 y.o. female who presents today for follow-up of depression, anxiety and attention problems.  Met with patient.      Current Medications:  Vyvanse 70 mg  Effexor 75 mg  Hydroxyzine 25 mg    Interval History and Content of Current Session:  Interim Events/Subjective Report/Content of Current Session:     Patient is a 26 year old female with a psychiatric history of depression, anxiety, PTSD, ADHD, and grief after losing a child. Patient is currently taking Vyvanse 70 mg, Effexor 75, and Hydroxyzine 25 mg.     Patient reports for follow up after increasing to Vyvanse 70 mg. The patient reports she is feeling a lot better and reports improvement with the binge eating since increasing the dose. She reports improvement with impulse control,  "binging, concentration, and energy.     She continues to endorse some binge eating but is more able to identify when she is wanting to eat despite no appetite and is more able to control the impulse to binge.    She also reports her mood has improved since increasing to Vyvanse 70 mg.    She denies any adverse effects of either medications. She reports the vyvanse has been more helpful than the effexor and reports she feels more her old self again.     She reports her anxiety is better.     She describes her sleep as "hit or miss", stating at times she falls asleep no problem, other times she has difficulty falling asleep. She reprots she has noticed sleeping more lately. She only takes the hydroxyzine prn.    She denies SI/HI      Psychotherapy:  · Target symptoms: depression, distractability, lack of focus, anxiety , binge eating  · Why chosen therapy is appropriate versus another modality: relevant to diagnosis, evidence based practice  · Outcome monitoring methods: self-report  · Therapeutic intervention type: supportive psychotherapy  · Topics discussed/themes: work stress, building skills sets for symptom management, symptom recognition  · The patient's response to the intervention is accepting. The patient's progress toward treatment goals is fair.   · Duration of intervention: 10 minutes.    Review of Systems   · PSYCHIATRIC: Pertinant items are noted in the narrative.    Past Medical, Family and Social History: The patient's past medical, family and social history have been reviewed and updated as appropriate within the electronic medical record - see encounter notes.    Compliance: yes    Side effects: None    Risk Parameters:  Patient reports no suicidal ideation  Patient reports no homicidal ideation  Patient reports no self-injurious behavior  Patient reports no violent behavior    Exam (detailed: at least 9 elements; comprehensive: all 15 elements)   Constitutional  Vitals:  Most recent vital signs, " dated greater than 90 days prior to this appointment, were reviewed.   There were no vitals filed for this visit.     General:  unremarkable, age appropriate, casually dressed     Musculoskeletal  Muscle Strength/Tone:  not examined   Gait & Station:  n/a, virtual appt     Psychiatric  Speech:  no latency; no press   Mood & Affect:  steady  congruent and appropriate   Thought Process:  normal and logical   Associations:  intact   Thought Content:  normal, no suicidality, no homicidality, delusions, or paranoia   Insight:  intact   Judgement: behavior is adequate to circumstances   Orientation:  grossly intact   Memory: intact for content of interview   Language: grossly intact   Attention Span & Concentration:  able to focus   Fund of Knowledge:  intact and appropriate to age and level of education     Assessment and Diagnosis   Status/Progress: Based on the examination today, the patient's problem(s) is/are adequately but not ideally controlled.  New problems have not been presented today.   Lack of compliance are not complicating management of the primary condition.  There are no active rule-out diagnoses for this patient at this time.     General Impression: Patient is a 26 year old female with a psychiatric history of XIOMARA, MDD, ADHD, PTSD, and grief after loss of a child. Patient reports benefit from Vyvanse 70 mg and Effexor 75 mg    No diagnosis found.    Intervention/Counseling/Treatment Plan   · Medication Management: The risks and benefits of medication were discussed with the patient.   · Continue Effexor 75 mg daily  · Continue Vyvanse 70 mg for binge eating  · Continue hydroxyzine prn nightly  · Vyvanse prescription (x3 months) printed and left at  per patient's request  Discussed with patient informed consent, risks vs. benefits, alternative treatments, side effect profile and the inherent unpredictability of individual responses to these treatments. The patient expresses understanding of the  above and displays the capacity to agree with this current plan and had no other questions.  Encouraged patient to keep future appointments.   Encouraged patient to message or call with questions or concerns  Safety plan reviewed with patient for worsening condition or suicidal ideations. In the event of an emergency patient was advised to go to the emergency room.      Return to Clinic: 3 months

## 2022-07-13 RX ORDER — LISDEXAMFETAMINE DIMESYLATE 70 MG/1
70 CAPSULE ORAL EVERY MORNING
Qty: 30 CAPSULE | Refills: 0 | Status: SHIPPED | OUTPATIENT
Start: 2022-07-13 | End: 2022-09-16 | Stop reason: SDUPTHER

## 2022-07-14 ENCOUNTER — PATIENT MESSAGE (OUTPATIENT)
Dept: PSYCHIATRY | Facility: CLINIC | Age: 27
End: 2022-07-14
Payer: MEDICAID

## 2022-07-14 RX ORDER — LISDEXAMFETAMINE DIMESYLATE 70 MG/1
70 CAPSULE ORAL EVERY MORNING
Qty: 30 CAPSULE | Refills: 0 | Status: SHIPPED | OUTPATIENT
Start: 2022-08-14 | End: 2022-09-16 | Stop reason: SDUPTHER

## 2022-07-14 RX ORDER — LISDEXAMFETAMINE DIMESYLATE 70 MG/1
70 CAPSULE ORAL EVERY MORNING
Qty: 30 CAPSULE | Refills: 0 | Status: SHIPPED | OUTPATIENT
Start: 2022-08-14 | End: 2022-07-14 | Stop reason: SDUPTHER

## 2022-09-16 ENCOUNTER — OFFICE VISIT (OUTPATIENT)
Dept: PSYCHIATRY | Facility: CLINIC | Age: 27
End: 2022-09-16
Payer: MEDICAID

## 2022-09-16 DIAGNOSIS — F41.1 GENERALIZED ANXIETY DISORDER: Primary | ICD-10-CM

## 2022-09-16 DIAGNOSIS — F90.2 ADHD (ATTENTION DEFICIT HYPERACTIVITY DISORDER), COMBINED TYPE: ICD-10-CM

## 2022-09-16 DIAGNOSIS — F33.1 MODERATE EPISODE OF RECURRENT MAJOR DEPRESSIVE DISORDER: ICD-10-CM

## 2022-09-16 PROCEDURE — 1160F RVW MEDS BY RX/DR IN RCRD: CPT | Mod: SA,HB,CPTII,95 | Performed by: PHYSICIAN ASSISTANT

## 2022-09-16 PROCEDURE — 99214 PR OFFICE/OUTPT VISIT, EST, LEVL IV, 30-39 MIN: ICD-10-PCS | Mod: SA,HB,95, | Performed by: PHYSICIAN ASSISTANT

## 2022-09-16 PROCEDURE — 1160F PR REVIEW ALL MEDS BY PRESCRIBER/CLIN PHARMACIST DOCUMENTED: ICD-10-PCS | Mod: SA,HB,CPTII,95 | Performed by: PHYSICIAN ASSISTANT

## 2022-09-16 PROCEDURE — 1159F MED LIST DOCD IN RCRD: CPT | Mod: SA,HB,CPTII,95 | Performed by: PHYSICIAN ASSISTANT

## 2022-09-16 PROCEDURE — 99214 OFFICE O/P EST MOD 30 MIN: CPT | Mod: SA,HB,95, | Performed by: PHYSICIAN ASSISTANT

## 2022-09-16 PROCEDURE — 1159F PR MEDICATION LIST DOCUMENTED IN MEDICAL RECORD: ICD-10-PCS | Mod: SA,HB,CPTII,95 | Performed by: PHYSICIAN ASSISTANT
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
## 2022-09-21 RX ORDER — LISDEXAMFETAMINE DIMESYLATE 70 MG/1
70 CAPSULE ORAL EVERY MORNING
Qty: 30 CAPSULE | Refills: 0 | Status: SHIPPED | OUTPATIENT
Start: 2022-11-21 | End: 2022-09-21 | Stop reason: SDUPTHER

## 2022-09-21 RX ORDER — VENLAFAXINE HYDROCHLORIDE 150 MG/1
150 CAPSULE, EXTENDED RELEASE ORAL DAILY
Qty: 30 CAPSULE | Refills: 1 | Status: SHIPPED | OUTPATIENT
Start: 2022-09-21 | End: 2022-09-21 | Stop reason: SDUPTHER

## 2022-09-21 RX ORDER — VENLAFAXINE HYDROCHLORIDE 150 MG/1
150 CAPSULE, EXTENDED RELEASE ORAL DAILY
Qty: 30 CAPSULE | Refills: 1 | Status: SHIPPED | OUTPATIENT
Start: 2022-09-21 | End: 2022-11-20

## 2022-09-21 RX ORDER — LISDEXAMFETAMINE DIMESYLATE 70 MG/1
70 CAPSULE ORAL EVERY MORNING
Qty: 30 CAPSULE | Refills: 0 | Status: SHIPPED | OUTPATIENT
Start: 2022-09-21 | End: 2022-09-21 | Stop reason: SDUPTHER

## 2022-09-21 RX ORDER — LISDEXAMFETAMINE DIMESYLATE 70 MG/1
70 CAPSULE ORAL EVERY MORNING
Qty: 30 CAPSULE | Refills: 0 | Status: SHIPPED | OUTPATIENT
Start: 2022-10-21 | End: 2023-06-02

## 2022-09-21 RX ORDER — HYDROXYZINE PAMOATE 25 MG/1
25 CAPSULE ORAL DAILY PRN
Qty: 30 CAPSULE | Refills: 2 | Status: SHIPPED | OUTPATIENT
Start: 2022-09-21 | End: 2022-09-21 | Stop reason: SDUPTHER

## 2022-09-21 RX ORDER — LISDEXAMFETAMINE DIMESYLATE 70 MG/1
70 CAPSULE ORAL EVERY MORNING
Qty: 30 CAPSULE | Refills: 0 | Status: SHIPPED | OUTPATIENT
Start: 2022-11-21 | End: 2023-06-02

## 2022-09-21 RX ORDER — LISDEXAMFETAMINE DIMESYLATE 70 MG/1
70 CAPSULE ORAL EVERY MORNING
Qty: 30 CAPSULE | Refills: 0 | Status: SHIPPED | OUTPATIENT
Start: 2022-09-21 | End: 2023-06-02

## 2022-09-21 RX ORDER — LISDEXAMFETAMINE DIMESYLATE 70 MG/1
70 CAPSULE ORAL EVERY MORNING
Qty: 30 CAPSULE | Refills: 0 | Status: SHIPPED | OUTPATIENT
Start: 2022-10-21 | End: 2022-09-21 | Stop reason: SDUPTHER

## 2022-09-21 RX ORDER — HYDROXYZINE PAMOATE 25 MG/1
25 CAPSULE ORAL DAILY PRN
Qty: 30 CAPSULE | Refills: 2 | Status: SHIPPED | OUTPATIENT
Start: 2022-09-21 | End: 2023-06-02 | Stop reason: SDUPTHER

## 2022-12-19 ENCOUNTER — OFFICE VISIT (OUTPATIENT)
Dept: PSYCHIATRY | Facility: CLINIC | Age: 27
End: 2022-12-19
Payer: MEDICAID

## 2022-12-19 DIAGNOSIS — F90.2 ADHD (ATTENTION DEFICIT HYPERACTIVITY DISORDER), COMBINED TYPE: Primary | ICD-10-CM

## 2022-12-19 DIAGNOSIS — F33.1 MODERATE EPISODE OF RECURRENT MAJOR DEPRESSIVE DISORDER: ICD-10-CM

## 2022-12-19 DIAGNOSIS — F41.1 GENERALIZED ANXIETY DISORDER: ICD-10-CM

## 2022-12-19 PROCEDURE — 99214 PR OFFICE/OUTPT VISIT, EST, LEVL IV, 30-39 MIN: ICD-10-PCS | Mod: SA,HB,95, | Performed by: PHYSICIAN ASSISTANT

## 2022-12-19 PROCEDURE — 1159F PR MEDICATION LIST DOCUMENTED IN MEDICAL RECORD: ICD-10-PCS | Mod: SA,HB,CPTII,95 | Performed by: PHYSICIAN ASSISTANT

## 2022-12-19 PROCEDURE — 1160F RVW MEDS BY RX/DR IN RCRD: CPT | Mod: SA,HB,CPTII,95 | Performed by: PHYSICIAN ASSISTANT

## 2022-12-19 PROCEDURE — 1159F MED LIST DOCD IN RCRD: CPT | Mod: SA,HB,CPTII,95 | Performed by: PHYSICIAN ASSISTANT

## 2022-12-19 PROCEDURE — 99214 OFFICE O/P EST MOD 30 MIN: CPT | Mod: SA,HB,95, | Performed by: PHYSICIAN ASSISTANT

## 2022-12-19 PROCEDURE — 1160F PR REVIEW ALL MEDS BY PRESCRIBER/CLIN PHARMACIST DOCUMENTED: ICD-10-PCS | Mod: SA,HB,CPTII,95 | Performed by: PHYSICIAN ASSISTANT

## 2022-12-19 RX ORDER — DEXTROAMPHETAMINE SACCHARATE, AMPHETAMINE ASPARTATE MONOHYDRATE, DEXTROAMPHETAMINE SULFATE AND AMPHETAMINE SULFATE 7.5; 7.5; 7.5; 7.5 MG/1; MG/1; MG/1; MG/1
30 CAPSULE, EXTENDED RELEASE ORAL EVERY MORNING
Qty: 30 CAPSULE | Refills: 0 | Status: SHIPPED | OUTPATIENT
Start: 2022-12-19 | End: 2023-02-09 | Stop reason: ALTCHOICE

## 2022-12-19 RX ORDER — DEXTROAMPHETAMINE SACCHARATE, AMPHETAMINE ASPARTATE MONOHYDRATE, DEXTROAMPHETAMINE SULFATE AND AMPHETAMINE SULFATE 7.5; 7.5; 7.5; 7.5 MG/1; MG/1; MG/1; MG/1
30 CAPSULE, EXTENDED RELEASE ORAL EVERY MORNING
Qty: 30 CAPSULE | Refills: 0 | Status: SHIPPED | OUTPATIENT
Start: 2023-01-19 | End: 2023-02-09 | Stop reason: ALTCHOICE

## 2022-12-19 RX ORDER — DEXTROAMPHETAMINE SACCHARATE, AMPHETAMINE ASPARTATE MONOHYDRATE, DEXTROAMPHETAMINE SULFATE AND AMPHETAMINE SULFATE 7.5; 7.5; 7.5; 7.5 MG/1; MG/1; MG/1; MG/1
30 CAPSULE, EXTENDED RELEASE ORAL EVERY MORNING
Qty: 30 CAPSULE | Refills: 0 | Status: SHIPPED | OUTPATIENT
Start: 2023-02-19 | End: 2023-02-09 | Stop reason: ALTCHOICE

## 2022-12-19 RX ORDER — VENLAFAXINE HYDROCHLORIDE 75 MG/1
75 CAPSULE, EXTENDED RELEASE ORAL DAILY
Qty: 30 CAPSULE | Refills: 2 | Status: SHIPPED | OUTPATIENT
Start: 2022-12-19 | End: 2023-06-02 | Stop reason: SDUPTHER

## 2022-12-19 NOTE — PROGRESS NOTES
"  The patient location is: car in [facility]  The chief complaint leading to consultation is: follow up, ADHD    Visit type: audiovisual    Face to Face time with patient: 25  35 minutes of total time spent on the encounter, which includes face to face time and non-face to face time preparing to see the patient (eg, review of tests), Obtaining and/or reviewing separately obtained history, Documenting clinical information in the electronic or other health record, Independently interpreting results (not separately reported) and communicating results to the patient/family/caregiver, or Care coordination (not separately reported).         Each patient to whom he or she provides medical services by telemedicine is:  (1) informed of the relationship between the physician and patient and the respective role of any other health care provider with respect to management of the patient; and (2) notified that he or she may decline to receive medical services by telemedicine and may withdraw from such care at any time.    Notes:     Outpatient Psychiatry Follow-Up Visit (PA)    12/19/2022    Clinical Status of Patient:  Outpatient (Ambulatory)    Chief Complaint:  Nina Montesinos is a 27 y.o. female who presents today for follow-up of depression, anxiety and attention problems.  Met with patient.      Current Medications:  Vyvanse 70 mg  Effexor 150 mg  Hydroxyzine 25 mg    Interval History and Content of Current Session:  Interim Events/Subjective Report/Content of Current Session:   Hammad last seen 9/16/2022    Patient is a 26 year old female with a psychiatric history of depression, anxiety, PTSD, ADHD, and grief after losing a child. Patient is currently taking Vyvanse 70 mg, Effexor 150, and Hydroxyzine 25 mg.     Patient presents to follow up today after increasing to Effexor 150 mg.     Patient states she was doing better but "currently cannot afford my prescriptions."  She states her company switched insurances, " "now co-pay is up to 300 dollars. Patient states she is not able to pay the deductible.     Patient stopped taking the medication two weeks ago.   Patient states prior to running out of the medication she was fine.   Patient reports she was "okay" with Effexor 150 mg, states she felt she was alittle better.     Patient states her mood has been "all over the place" within the last 2 weeks.   She endorses depression within the last 2 weeks.   She reports lack of motivation, lack of interest, increased socialization.     She states the last 2 weeks her anxiety has been "horrible."    She reports headaches after stopping Effexor 150 mg.     Patient states she was doing "normal" when she was on the medication.     She is sleeping well.   She is eating "way too much."    Patient is living in an apartment with daughter in [facility]- better to not be with mom but new financial strain.     Patient is wanting to taper back up on Effexor    Will plan to pay out of pocket for Effexor, switch from vyvanse to adderall to pay out of pocket until patient can meet her deductible.     Patient requests that prescriptions be printed and left at . This provider asks that when she get her prescriptions, she have her vitals taken as it has been a year. This provider explains that in order to prescribe stimulants, need at least annual vitals.     Psychotherapy:  Target symptoms: depression, distractability, lack of focus, anxiety , binge eating  Why chosen therapy is appropriate versus another modality: relevant to diagnosis, evidence based practice  Outcome monitoring methods: self-report  Therapeutic intervention type: insight oriented psychotherapy, supportive psychotherapy  Topics discussed/themes: work stress, parenting issues, building skills sets for symptom management, symptom recognition, financial stressors  The patient's response to the intervention is accepting. The patient's progress toward treatment goals is fair. "   Duration of intervention: 15 minutes.    Review of Systems   PSYCHIATRIC: Pertinant items are noted in the narrative.    Past Medical, Family and Social History: The patient's past medical, family and social history have been reviewed and updated as appropriate within the electronic medical record - see encounter notes.    Compliance: yes, recently ran out    Side effects: None    Risk Parameters:  Patient reports no suicidal ideation  Patient reports no homicidal ideation  Patient reports no self-injurious behavior  Patient reports no violent behavior    Exam (detailed: at least 9 elements; comprehensive: all 15 elements)   Constitutional  Vitals:  Most recent vital signs, dated greater than 90 days prior to this appointment, were reviewed.   There were no vitals filed for this visit.     General:  unremarkable, age appropriate, casually dressed     Musculoskeletal  Muscle Strength/Tone:  not examined   Gait & Station:  n/a, virtual appt     Psychiatric  Speech:  no latency; no press   Mood & Affect:  anxious, dysthymic  congruent and appropriate   Thought Process:  normal and logical   Associations:  intact   Thought Content:  normal, no suicidality, no homicidality, delusions, or paranoia   Insight:  intact   Judgement: behavior is adequate to circumstances   Orientation:  grossly intact   Memory: intact for content of interview   Language: grossly intact   Attention Span & Concentration:  able to focus   Fund of Knowledge:  intact and appropriate to age and level of education     Assessment and Diagnosis   Status/Progress: Based on the examination today, the patient's problem(s) is/are adequately but not ideally controlled.  New problems have been presented today.   Lack of compliance are not complicating management of the primary condition.  There are no active rule-out diagnoses for this patient at this time.     General Impression: Patient is a 27 year old female with a psychiatric history of XIOMARA, MDD,  ADHD, PTSD, and grief after loss of a child. Patient reports benefit from Vyvanse 70 mg and Effexor 150 mg, however reports she ran out 2 weeks ago and has not been able to get due to new insurance.     Patient advised that she use [employer] coupon to afford Effexor and will send in Adderall XR 30 mg rather than Vyvanse until insurance can cover. Advised to pay out of pocket, especially for antidepressants. This provider to supply [employer] coupon.     She is stable at this time      ICD-10-CM ICD-9-CM   1. ADHD (attention deficit hyperactivity disorder), combined type  F90.2 314.01   2. Generalized anxiety disorder  F41.1 300.02   3. Moderate episode of recurrent major depressive disorder  F33.1 296.32         Intervention/Counseling/Treatment Plan   Medication Management: The risks and benefits of medication were discussed with the patient.   Restart Effexor- start 75 mg daily  Stop Vyvanse 70 mg for binge eating and ADHD  Start Adderall XR 30 mg- more affordable  Continue hydroxyzine prn nightly  Effexor 75 mg and Adderall XR 30 mg prescription (x3 months) printed and left at  per patient's request.  Good Rx coupon provided  Discussed with patient informed consent, risks vs. benefits, alternative treatments, side effect profile and the inherent unpredictability of individual responses to these treatments. The patient expresses understanding of the above and displays the capacity to agree with this current plan and had no other questions.  Encouraged patient to keep future appointments.   Encouraged patient to message or call with questions or concerns  Safety plan reviewed with patient for worsening condition or suicidal ideations. In the event of an emergency patient was advised to go to the emergency room.      Return to Clinic: 2 months

## 2022-12-20 ENCOUNTER — PATIENT MESSAGE (OUTPATIENT)
Dept: PSYCHIATRY | Facility: CLINIC | Age: 27
End: 2022-12-20
Payer: MEDICAID

## 2023-02-04 ENCOUNTER — PATIENT MESSAGE (OUTPATIENT)
Dept: PSYCHIATRY | Facility: CLINIC | Age: 28
End: 2023-02-04
Payer: MEDICAID

## 2023-02-09 RX ORDER — LISDEXAMFETAMINE DIMESYLATE 60 MG/1
60 CAPSULE ORAL EVERY MORNING
Qty: 30 CAPSULE | Refills: 0 | Status: SHIPPED | OUTPATIENT
Start: 2023-03-09 | End: 2023-06-02

## 2023-02-09 RX ORDER — LISDEXAMFETAMINE DIMESYLATE 60 MG/1
60 CAPSULE ORAL EVERY MORNING
Qty: 30 CAPSULE | Refills: 0 | Status: SHIPPED | OUTPATIENT
Start: 2023-02-09 | End: 2023-06-02

## 2023-03-13 ENCOUNTER — OFFICE VISIT (OUTPATIENT)
Dept: OBSTETRICS AND GYNECOLOGY | Facility: CLINIC | Age: 28
End: 2023-03-13
Payer: MEDICAID

## 2023-03-13 VITALS
HEIGHT: 61 IN | SYSTOLIC BLOOD PRESSURE: 110 MMHG | WEIGHT: 169.75 LBS | DIASTOLIC BLOOD PRESSURE: 70 MMHG | BODY MASS INDEX: 32.05 KG/M2

## 2023-03-13 DIAGNOSIS — Z01.419 ENCOUNTER FOR GYNECOLOGICAL EXAMINATION (GENERAL) (ROUTINE) WITHOUT ABNORMAL FINDINGS: ICD-10-CM

## 2023-03-13 DIAGNOSIS — Z12.4 SCREENING FOR CERVICAL CANCER: Primary | ICD-10-CM

## 2023-03-13 DIAGNOSIS — N94.3 PMS (PREMENSTRUAL SYNDROME): ICD-10-CM

## 2023-03-13 PROCEDURE — 99395 PR PREVENTIVE VISIT,EST,18-39: ICD-10-PCS | Mod: S$PBB,,, | Performed by: OBSTETRICS & GYNECOLOGY

## 2023-03-13 PROCEDURE — 88175 CYTOPATH C/V AUTO FLUID REDO: CPT | Performed by: OBSTETRICS & GYNECOLOGY

## 2023-03-13 PROCEDURE — 3078F DIAST BP <80 MM HG: CPT | Mod: CPTII,,, | Performed by: OBSTETRICS & GYNECOLOGY

## 2023-03-13 PROCEDURE — 99999 PR PBB SHADOW E&M-EST. PATIENT-LVL III: ICD-10-PCS | Mod: PBBFAC,,, | Performed by: OBSTETRICS & GYNECOLOGY

## 2023-03-13 PROCEDURE — 99999 PR PBB SHADOW E&M-EST. PATIENT-LVL III: CPT | Mod: PBBFAC,,, | Performed by: OBSTETRICS & GYNECOLOGY

## 2023-03-13 PROCEDURE — 1159F PR MEDICATION LIST DOCUMENTED IN MEDICAL RECORD: ICD-10-PCS | Mod: CPTII,,, | Performed by: OBSTETRICS & GYNECOLOGY

## 2023-03-13 PROCEDURE — 3008F PR BODY MASS INDEX (BMI) DOCUMENTED: ICD-10-PCS | Mod: CPTII,,, | Performed by: OBSTETRICS & GYNECOLOGY

## 2023-03-13 PROCEDURE — 3078F PR MOST RECENT DIASTOLIC BLOOD PRESSURE < 80 MM HG: ICD-10-PCS | Mod: CPTII,,, | Performed by: OBSTETRICS & GYNECOLOGY

## 2023-03-13 PROCEDURE — 99213 OFFICE O/P EST LOW 20 MIN: CPT | Mod: PBBFAC | Performed by: OBSTETRICS & GYNECOLOGY

## 2023-03-13 PROCEDURE — 3074F PR MOST RECENT SYSTOLIC BLOOD PRESSURE < 130 MM HG: ICD-10-PCS | Mod: CPTII,,, | Performed by: OBSTETRICS & GYNECOLOGY

## 2023-03-13 PROCEDURE — 1159F MED LIST DOCD IN RCRD: CPT | Mod: CPTII,,, | Performed by: OBSTETRICS & GYNECOLOGY

## 2023-03-13 PROCEDURE — 99395 PREV VISIT EST AGE 18-39: CPT | Mod: S$PBB,,, | Performed by: OBSTETRICS & GYNECOLOGY

## 2023-03-13 PROCEDURE — 3074F SYST BP LT 130 MM HG: CPT | Mod: CPTII,,, | Performed by: OBSTETRICS & GYNECOLOGY

## 2023-03-13 PROCEDURE — 3008F BODY MASS INDEX DOCD: CPT | Mod: CPTII,,, | Performed by: OBSTETRICS & GYNECOLOGY

## 2023-03-13 NOTE — PROGRESS NOTES
Subjective:       Patient ID: Nina Montesinos is a 27 y.o. female.    Chief Complaint:  Annual Exam (Last pap  normal)        History of Present Illness  Nina Montesinos is a 27 y.o. female  who presents for annual. Has MIrena. Rare periods. Says she has intermittent RLQ pain, after discussion I think it may be ovulation pain. Also she has some mood swings, and headaches. Worse with stress. Now living alone with her daughter, recently changed jobs. HA have no aura. Did well on OCP in the past. The main reason we did IUD was should could not remember to take the pill and we wanted to prevent pregnancy. Will leave IUD in and add low dose OCP for cycle control for possible ovulation pain and menstrual migraines and PMS    No LMP recorded. Patient has had an implant.   Date of Last Pap: 3/13/2023    Review of Systems  Review of Systems   Constitutional:  Negative for chills and fever.      Objective:   Physical Exam:   Constitutional: She is oriented to person, place, and time. Vital signs are normal. She appears well-developed and well-nourished. No distress.        Pulmonary/Chest: She exhibits no mass. Right breast exhibits no mass, no nipple discharge, no skin change, no tenderness, no bleeding and no swelling. Left breast exhibits no mass, no nipple discharge, no skin change, no tenderness, no bleeding and no swelling. Breasts are symmetrical.        Abdominal: Soft. Bowel sounds are normal. She exhibits no distension and no mass. There is no abdominal tenderness. There is no rebound.     Genitourinary:    Vagina and uterus normal.   There is no rash, tenderness, lesion or injury on the right labia. There is no rash, tenderness, lesion or injury on the left labia. Cervix is normal. Right adnexum displays no mass, no tenderness and no fullness. Left adnexum displays no mass, no tenderness and no fullness. No erythema,  no vaginal discharge, tenderness, rectocele, cystocele or unspecified  prolapse of vaginal walls in the vagina. Cervix exhibits no motion tenderness, no discharge and no friability. Uterus is not deviated, not enlarged, not fixed, not tender and not hosting fibroids. IUD strings visualized.          Musculoskeletal: Normal range of motion and moves all extremeties.      Lymphadenopathy:        Right: No supraclavicular adenopathy present.        Left: No supraclavicular adenopathy present.    Neurological: She is alert and oriented to person, place, and time.    Skin: Skin is warm and dry.    Psychiatric: She has a normal mood and affect. Her behavior is normal. Judgment normal.      Assessment/ Plan:     1. Screening for cervical cancer  Liquid-Based Pap Smear, Screening      2. Encounter for gynecological examination (general) (routine) without abnormal findings        3. PMS (premenstrual syndrome)  norethindrone-e.estradioL-iron 1 mg-20 mcg (24)/75 mg (4) Oral per tablet          No follow-ups on file.    As of April 1, 2021, the Cures Act has been passed nationally. This new law requires that all doctors progress notes, lab results, pathology reports and radiology reports be released IMMEDIATELY to the patient in the patient portal. That means that the results are released to you at the EXACT same time they are released to me. Therefore, with all of the patients that I have I am not able to reply to each patient exactly when the results come in. So there will be a delay from when you see the results to when I see them and have time to come up with a response to send you. Also I only see these results when I am on the computer at work. So if the results come in over the weekend or after 5 pm of a work day, I will not see them until the next business day. As you can tell, this is a challenge as a physician to give every patient the quick response they hope for and deserve. So please be patient! Thanks for understanding, Dr. Haney

## 2023-03-20 LAB
FINAL PATHOLOGIC DIAGNOSIS: NORMAL
Lab: NORMAL

## 2023-05-30 ENCOUNTER — PATIENT MESSAGE (OUTPATIENT)
Dept: PSYCHIATRY | Facility: CLINIC | Age: 28
End: 2023-05-30
Payer: MEDICAID

## 2023-06-02 ENCOUNTER — OFFICE VISIT (OUTPATIENT)
Dept: PSYCHIATRY | Facility: CLINIC | Age: 28
End: 2023-06-02
Payer: MEDICAID

## 2023-06-02 VITALS
BODY MASS INDEX: 32.78 KG/M2 | SYSTOLIC BLOOD PRESSURE: 126 MMHG | WEIGHT: 173.5 LBS | HEART RATE: 80 BPM | DIASTOLIC BLOOD PRESSURE: 77 MMHG

## 2023-06-02 DIAGNOSIS — F90.2 ADHD (ATTENTION DEFICIT HYPERACTIVITY DISORDER), COMBINED TYPE: Primary | ICD-10-CM

## 2023-06-02 DIAGNOSIS — F41.1 GENERALIZED ANXIETY DISORDER: ICD-10-CM

## 2023-06-02 DIAGNOSIS — F33.1 MODERATE EPISODE OF RECURRENT MAJOR DEPRESSIVE DISORDER: ICD-10-CM

## 2023-06-02 PROCEDURE — 99999 PR PBB SHADOW E&M-EST. PATIENT-LVL III: CPT | Mod: PBBFAC,SA,HB, | Performed by: PHYSICIAN ASSISTANT

## 2023-06-02 PROCEDURE — 99999 PR PBB SHADOW E&M-EST. PATIENT-LVL III: ICD-10-PCS | Mod: PBBFAC,SA,HB, | Performed by: PHYSICIAN ASSISTANT

## 2023-06-02 PROCEDURE — 1159F MED LIST DOCD IN RCRD: CPT | Mod: SA,HB,CPTII, | Performed by: PHYSICIAN ASSISTANT

## 2023-06-02 PROCEDURE — 99214 PR OFFICE/OUTPT VISIT, EST, LEVL IV, 30-39 MIN: ICD-10-PCS | Mod: S$PBB,SA,HB, | Performed by: PHYSICIAN ASSISTANT

## 2023-06-02 PROCEDURE — 1160F PR REVIEW ALL MEDS BY PRESCRIBER/CLIN PHARMACIST DOCUMENTED: ICD-10-PCS | Mod: SA,HB,CPTII, | Performed by: PHYSICIAN ASSISTANT

## 2023-06-02 PROCEDURE — 3074F SYST BP LT 130 MM HG: CPT | Mod: SA,HB,CPTII, | Performed by: PHYSICIAN ASSISTANT

## 2023-06-02 PROCEDURE — 1160F RVW MEDS BY RX/DR IN RCRD: CPT | Mod: SA,HB,CPTII, | Performed by: PHYSICIAN ASSISTANT

## 2023-06-02 PROCEDURE — 3078F PR MOST RECENT DIASTOLIC BLOOD PRESSURE < 80 MM HG: ICD-10-PCS | Mod: SA,HB,CPTII, | Performed by: PHYSICIAN ASSISTANT

## 2023-06-02 PROCEDURE — 99214 OFFICE O/P EST MOD 30 MIN: CPT | Mod: S$PBB,SA,HB, | Performed by: PHYSICIAN ASSISTANT

## 2023-06-02 PROCEDURE — 3008F PR BODY MASS INDEX (BMI) DOCUMENTED: ICD-10-PCS | Mod: SA,HB,CPTII, | Performed by: PHYSICIAN ASSISTANT

## 2023-06-02 PROCEDURE — 1159F PR MEDICATION LIST DOCUMENTED IN MEDICAL RECORD: ICD-10-PCS | Mod: SA,HB,CPTII, | Performed by: PHYSICIAN ASSISTANT

## 2023-06-02 PROCEDURE — 3078F DIAST BP <80 MM HG: CPT | Mod: SA,HB,CPTII, | Performed by: PHYSICIAN ASSISTANT

## 2023-06-02 PROCEDURE — 3074F PR MOST RECENT SYSTOLIC BLOOD PRESSURE < 130 MM HG: ICD-10-PCS | Mod: SA,HB,CPTII, | Performed by: PHYSICIAN ASSISTANT

## 2023-06-02 PROCEDURE — 99213 OFFICE O/P EST LOW 20 MIN: CPT | Mod: PBBFAC | Performed by: PHYSICIAN ASSISTANT

## 2023-06-02 PROCEDURE — 3008F BODY MASS INDEX DOCD: CPT | Mod: SA,HB,CPTII, | Performed by: PHYSICIAN ASSISTANT

## 2023-06-02 RX ORDER — LISDEXAMFETAMINE DIMESYLATE 70 MG/1
70 CAPSULE ORAL EVERY MORNING
Qty: 30 CAPSULE | Refills: 0 | Status: SHIPPED | OUTPATIENT
Start: 2023-08-02 | End: 2023-06-02 | Stop reason: SDUPTHER

## 2023-06-02 RX ORDER — HYDROXYZINE PAMOATE 25 MG/1
25 CAPSULE ORAL DAILY PRN
Qty: 90 CAPSULE | Refills: 0 | Status: SHIPPED | OUTPATIENT
Start: 2023-06-02 | End: 2023-06-02 | Stop reason: SDUPTHER

## 2023-06-02 RX ORDER — LISDEXAMFETAMINE DIMESYLATE 70 MG/1
70 CAPSULE ORAL EVERY MORNING
Qty: 30 CAPSULE | Refills: 0 | Status: SHIPPED | OUTPATIENT
Start: 2023-06-02 | End: 2023-06-02 | Stop reason: SDUPTHER

## 2023-06-02 RX ORDER — VENLAFAXINE HYDROCHLORIDE 75 MG/1
75 CAPSULE, EXTENDED RELEASE ORAL DAILY
Qty: 90 CAPSULE | Refills: 1 | Status: SHIPPED | OUTPATIENT
Start: 2023-06-02 | End: 2023-06-02 | Stop reason: SDUPTHER

## 2023-06-02 RX ORDER — LISDEXAMFETAMINE DIMESYLATE 70 MG/1
70 CAPSULE ORAL EVERY MORNING
Qty: 30 CAPSULE | Refills: 0 | Status: SHIPPED | OUTPATIENT
Start: 2023-07-02 | End: 2023-06-02 | Stop reason: SDUPTHER

## 2023-06-02 RX ORDER — HYDROXYZINE HYDROCHLORIDE 25 MG/1
25 TABLET, FILM COATED ORAL
Qty: 30 TABLET | Refills: 1 | Status: SHIPPED | OUTPATIENT
Start: 2023-06-02 | End: 2023-06-02 | Stop reason: SDUPTHER

## 2023-06-02 RX ORDER — HYDROXYZINE HYDROCHLORIDE 25 MG/1
25 TABLET, FILM COATED ORAL
Qty: 30 TABLET | Refills: 1 | Status: SHIPPED | OUTPATIENT
Start: 2023-06-02 | End: 2023-08-25

## 2023-06-02 RX ORDER — LISDEXAMFETAMINE DIMESYLATE 70 MG/1
70 CAPSULE ORAL EVERY MORNING
Qty: 30 CAPSULE | Refills: 0 | Status: SHIPPED | OUTPATIENT
Start: 2023-07-02 | End: 2023-12-01 | Stop reason: SDUPTHER

## 2023-06-02 RX ORDER — LISDEXAMFETAMINE DIMESYLATE 70 MG/1
70 CAPSULE ORAL EVERY MORNING
Qty: 30 CAPSULE | Refills: 0 | Status: SHIPPED | OUTPATIENT
Start: 2023-06-02 | End: 2023-12-01 | Stop reason: SDUPTHER

## 2023-06-02 RX ORDER — LISDEXAMFETAMINE DIMESYLATE 70 MG/1
70 CAPSULE ORAL EVERY MORNING
Qty: 30 CAPSULE | Refills: 0 | Status: SHIPPED | OUTPATIENT
Start: 2023-08-02 | End: 2023-11-01 | Stop reason: SDUPTHER

## 2023-06-02 RX ORDER — VENLAFAXINE HYDROCHLORIDE 75 MG/1
75 CAPSULE, EXTENDED RELEASE ORAL DAILY
Qty: 90 CAPSULE | Refills: 1 | Status: SHIPPED | OUTPATIENT
Start: 2023-06-02 | End: 2023-08-25 | Stop reason: SDUPTHER

## 2023-06-02 NOTE — PROGRESS NOTES
"  Outpatient Psychiatry Follow-Up Visit (PA)    6/2/2023    Clinical Status of Patient:  Outpatient (Ambulatory)    Chief Complaint:  Nina Montesinos is a 27 y.o. female who presents today for follow-up of depression, anxiety and attention problems.  Met with patient.      Current Medications:  Vyvanse 60 mg  Effexor 75 mg  Hydroxyzine 25 mg    Interval History and Content of Current Session:  Interim Events/Subjective Report/Content of Current Session:   Hammad last seen 12/19/2022    Patient is a 27 year old female with a psychiatric history of depression, anxiety, PTSD, ADHD, and grief after losing a child.     Patient presents to follow up today after restarting Effexor 75 mg, switching back from Adderall to Vyvanse 60 mg due to migraines.   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   She reports some less stress due to flexibility of job.     Her mood has been okay.   She states decreased anxiety since less stressed.   She is taking hydroxyzine 2-3x a week, usually related to caring for her daughter.   She rates anxiety 5/10.     She reports improvement in depression.   She reports the effexor 75 mg is going well. She is happy with the dose.    She denies ASE. She denies chest pain, tachycardia, or palpitations.     She is sleeping well.   She is binge eating mostly at night. Vyvanse 60 mg helped "a little bit", but around 8:30, "just stuffing my face."  Historically Vyvanse 70 mg works better to decrease binge eating.     She denies SI/HI/AVH      Psychotherapy:  Target symptoms: depression, distractability, lack of focus, anxiety , binge eating  Why chosen therapy is appropriate versus another modality: relevant to diagnosis, evidence based practice  Outcome monitoring methods: self-report  Therapeutic " intervention type: insight oriented psychotherapy, supportive psychotherapy  Topics discussed/themes: work stress, parenting issues, building skills sets for symptom management, symptom recognition  The patient's response to the intervention is accepting. The patient's progress toward treatment goals is fair.   Duration of intervention: 10 minutes.    Review of Systems   PSYCHIATRIC: Pertinant items are noted in the narrative.    Past Medication Trials:  Adderall-migraines    Past Medical, Family and Social History: The patient's past medical, family and social history have been reviewed and updated as appropriate within the electronic medical record - see encounter notes.    Compliance: yes,     Side effects: None    Risk Parameters:  Patient reports no suicidal ideation  Patient reports no homicidal ideation  Patient reports no self-injurious behavior  Patient reports no violent behavior    Exam (detailed: at least 9 elements; comprehensive: all 15 elements)   Constitutional  Vitals:  Most recent vital signs, dated greater than 90 days prior to this appointment, were reviewed.   Vitals:    06/02/23 1341   BP: 126/77   Pulse: 80   Weight: 78.7 kg (173 lb 8 oz)        General:  unremarkable, age appropriate, casually dressed     Musculoskeletal  Muscle Strength/Tone:  not examined   Gait & Station:  non-ataxic     Psychiatric  Speech:  no latency; no press   Mood & Affect:  steady  congruent and appropriate   Thought Process:  normal and logical   Associations:  intact   Thought Content:  normal, no suicidality, no homicidality, delusions, or paranoia   Insight:  intact   Judgement: behavior is adequate to circumstances   Orientation:  grossly intact   Memory: intact for content of interview   Language: grossly intact   Attention Span & Concentration:  able to focus   Fund of Knowledge:  intact and appropriate to age and level of education     Assessment and Diagnosis   Status/Progress: Based on the examination  today, the patient's problem(s) is/are improved.  New problems have not been presented today.   Lack of compliance are not complicating management of the primary condition.  There are no active rule-out diagnoses for this patient at this time.     General Impression: Patient is a 27 year old female with a psychiatric history of XIOMARA, MDD, ADHD, PTSD, and grief after loss of a child. Patient reports benefit with effexor 75 mg, but limited benefit in binge eating with vyvanse 60 mg. Pt requests to increase back to 70 mg.     She is stable at this time      ICD-10-CM ICD-9-CM   1. ADHD (attention deficit hyperactivity disorder), combined type  F90.2 314.01   2. Generalized anxiety disorder  F41.1 300.02   3. Moderate episode of recurrent major depressive disorder  F33.1 296.32           Intervention/Counseling/Treatment Plan   Medication Management: The risks and benefits of medication were discussed with the patient.   Continue Effexor 75 mg daily  Increase to Vyvanse 70 mg   Continue hydroxyzine prn nightly  Effexor 75 mg and Adderall XR 30 mg prescription (x3 months) printed  Discussed with patient informed consent, risks vs. benefits, alternative treatments, side effect profile and the inherent unpredictability of individual responses to these treatments. The patient expresses understanding of the above and displays the capacity to agree with this current plan and had no other questions.  Encouraged patient to keep future appointments.   Encouraged patient to message or call with questions or concerns  Safety plan reviewed with patient for worsening condition or suicidal ideations. In the event of an emergency patient was advised to go to the emergency room.      Return to Clinic: 3 months

## 2023-08-25 ENCOUNTER — OFFICE VISIT (OUTPATIENT)
Dept: PSYCHIATRY | Facility: CLINIC | Age: 28
End: 2023-08-25
Payer: MEDICAID

## 2023-08-25 DIAGNOSIS — F33.1 MODERATE EPISODE OF RECURRENT MAJOR DEPRESSIVE DISORDER: ICD-10-CM

## 2023-08-25 DIAGNOSIS — F41.1 GENERALIZED ANXIETY DISORDER: ICD-10-CM

## 2023-08-25 DIAGNOSIS — F90.2 ADHD (ATTENTION DEFICIT HYPERACTIVITY DISORDER), COMBINED TYPE: Primary | ICD-10-CM

## 2023-08-25 DIAGNOSIS — R63.2 BINGE EATING: ICD-10-CM

## 2023-08-25 PROCEDURE — 1160F RVW MEDS BY RX/DR IN RCRD: CPT | Mod: CPTII,95,, | Performed by: PHYSICIAN ASSISTANT

## 2023-08-25 PROCEDURE — 99214 PR OFFICE/OUTPT VISIT, EST, LEVL IV, 30-39 MIN: ICD-10-PCS | Mod: SA,HB,95, | Performed by: PHYSICIAN ASSISTANT

## 2023-08-25 PROCEDURE — 99214 OFFICE O/P EST MOD 30 MIN: CPT | Mod: SA,HB,95, | Performed by: PHYSICIAN ASSISTANT

## 2023-08-25 PROCEDURE — 1159F PR MEDICATION LIST DOCUMENTED IN MEDICAL RECORD: ICD-10-PCS | Mod: CPTII,95,, | Performed by: PHYSICIAN ASSISTANT

## 2023-08-25 PROCEDURE — 1160F PR REVIEW ALL MEDS BY PRESCRIBER/CLIN PHARMACIST DOCUMENTED: ICD-10-PCS | Mod: CPTII,95,, | Performed by: PHYSICIAN ASSISTANT

## 2023-08-25 PROCEDURE — 1159F MED LIST DOCD IN RCRD: CPT | Mod: CPTII,95,, | Performed by: PHYSICIAN ASSISTANT

## 2023-08-25 RX ORDER — DEXTROAMPHETAMINE SULFATE, DEXTROAMPHETAMINE SACCHARATE, AMPHETAMINE ASPARTATE MONOHYDRATE, AND AMPHETAMINE SULFATE 9.375; 9.375; 9.375; 9.375 MG/1; MG/1; MG/1; MG/1
37.5 CAPSULE, EXTENDED RELEASE ORAL DAILY
Qty: 30 EACH | Refills: 0 | Status: SHIPPED | OUTPATIENT
Start: 2023-08-25 | End: 2023-09-27 | Stop reason: DRUGHIGH

## 2023-08-25 RX ORDER — VENLAFAXINE HYDROCHLORIDE 75 MG/1
75 CAPSULE, EXTENDED RELEASE ORAL DAILY
Qty: 90 CAPSULE | Refills: 1 | Status: SHIPPED | OUTPATIENT
Start: 2023-08-25 | End: 2023-12-01 | Stop reason: SDUPTHER

## 2023-08-25 NOTE — PROGRESS NOTES
"The patient location is: [facility], [place]  The chief complaint leading to consultation is: f/u    Visit type: audiovisual    Face to Face time with patient: 15  22 minutes of total time spent on the encounter, which includes face to face time and non-face to face time preparing to see the patient (eg, review of tests), Obtaining and/or reviewing separately obtained history, Documenting clinical information in the electronic or other health record, Independently interpreting results (not separately reported) and communicating results to the patient/family/caregiver, or Care coordination (not separately reported).         Each patient to whom he or she provides medical services by telemedicine is:  (1) informed of the relationship between the physician and patient and the respective role of any other health care provider with respect to management of the patient; and (2) notified that he or she may decline to receive medical services by telemedicine and may withdraw from such care at any time.    Notes:     Outpatient Psychiatry Follow-Up Visit (PA)    8/25/2023    Clinical Status of Patient:  Outpatient (Ambulatory)    Chief Complaint:  Nina Montesinos is a 28 y.o. female who presents today for follow-up of depression, anxiety and attention problems.  Met with patient.      Current Medications:  Vyvanse 70 mg  Effexor 75 mg  Hydroxyzine 25 mg    Interval History and Content of Current Session:  Interim Events/Subjective Report/Content of Current Session:   Hammad last seen 6/6/2023    Patient is a 28 year old female with a psychiatric history of depression, anxiety, PTSD, ADHD, and grief after losing a child.     Patient presents to follow up today after increasing to vyvanse 70 mg.  Patient states she has not been able to get the vyvanse 70 mg, has tried "30 pharmacies".    Otherwise, patient states she has been good.   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   Her mood has been "good."  Her anxiety has been "so-so... "not worse, not horrible." She reports stress with daughter's father.     She feels the effexor 75 mg is working "good." She is happy with the dose, not wanting to make adjustments  She denies any ASE.     Her sleep is fluctuating; her sleep has worsened since stopping vyvanse, she states she is "barely sleeping or sleeping too much."  Her appetite has been "horrible" and endorses increased binge eating without vyvanse. She states "I know I'm eating too much and I cannot stop."    PT has taken adderall in the past and reports migraines when taking. She is amenable to trial of mydais for adhd, binge eating control until vyvanse is available.       Psychotherapy:  Target symptoms: depression, distractability, lack of focus, anxiety , binge eating  Why chosen therapy is appropriate versus another modality: relevant to diagnosis, evidence based practice  Outcome monitoring methods: self-report  Therapeutic intervention type: supportive psychotherapy  Topics discussed/themes: work stress, parenting issues, building skills sets for symptom management, symptom recognition  The patient's response to the intervention is accepting. The patient's progress toward treatment goals is fair.   Duration of intervention: 12 minutes.    Review of Systems   PSYCHIATRIC: Pertinant items are noted in the narrative.    Past Medication Trials:  Adderall-migraines    Past Medical, Family and Social History: The patient's past medical, family and social history have been reviewed and updated as appropriate within the electronic medical record - see encounter notes.    Compliance: yes    Side effects: None    Risk Parameters:  Patient reports no suicidal ideation  Patient reports no homicidal ideation  Patient reports no self-injurious behavior  Patient reports no violent behavior    Exam (detailed: at least 9 elements; comprehensive: all 15 elements) "   Constitutional  Vitals:  Most recent vital signs, dated less than 90 days prior to this appointment, were reviewed.   There were no vitals filed for this visit.       General:  unremarkable, age appropriate, casually dressed     Musculoskeletal  Muscle Strength/Tone:  not examined   Gait & Station:  non-ataxic     Psychiatric  Speech:  no latency; no press   Mood & Affect:  steady  congruent and appropriate   Thought Process:  normal and logical   Associations:  intact   Thought Content:  normal, no suicidality, no homicidality, delusions, or paranoia   Insight:  intact   Judgement: behavior is adequate to circumstances   Orientation:  grossly intact   Memory: intact for content of interview   Language: grossly intact   Attention Span & Concentration:  able to focus   Fund of Knowledge:  intact and appropriate to age and level of education     Assessment and Diagnosis   Status/Progress: Based on the examination today, the patient's problem(s) is/are adequately but not ideally controlled.  New problems have not been presented today.   Lack of compliance complicating management of the primary condition.  There are no active rule-out diagnoses for this patient at this time.     General Impression: Patient is a 28 year old female with a psychiatric history of XIOMARA, MDD, ADHD, PTSD, and grief after loss of a child. Patient reports benefit with effexor 75 mg, but has not been able to get her vyvanse filled due to shortage, is amenable to trial of mydais.     She is stable at this time      ICD-10-CM ICD-9-CM   1. ADHD (attention deficit hyperactivity disorder), combined type  F90.2 314.01   2. Generalized anxiety disorder  F41.1 300.02   3. Moderate episode of recurrent major depressive disorder  F33.1 296.32   4. Binge eating  R63.2 783.6             Intervention/Counseling/Treatment Plan   Medication Management: The risks and benefits of medication were discussed with the patient.   Continue Effexor 75 mg daily  Start  Mydais 37.5 mg for ADHD, binge eating.   Discussed with patient informed consent, risks vs. benefits, alternative treatments, side effect profile and the inherent unpredictability of individual responses to these treatments. The patient expresses understanding of the above and displays the capacity to agree with this current plan and had no other questions.  Encouraged patient to keep future appointments.   Encouraged patient to message or call with questions or concerns  Safety plan reviewed with patient for worsening condition or suicidal ideations. In the event of an emergency patient was advised to go to the emergency room.      Return to Clinic: 3 months

## 2023-09-18 ENCOUNTER — PROCEDURE VISIT (OUTPATIENT)
Dept: OBSTETRICS AND GYNECOLOGY | Facility: CLINIC | Age: 28
End: 2023-09-18
Payer: MEDICAID

## 2023-09-18 VITALS — DIASTOLIC BLOOD PRESSURE: 60 MMHG | BODY MASS INDEX: 32.91 KG/M2 | SYSTOLIC BLOOD PRESSURE: 98 MMHG | WEIGHT: 174.19 LBS

## 2023-09-18 DIAGNOSIS — Z30.015 ENCOUNTER FOR INITIAL PRESCRIPTION OF VAGINAL RING HORMONAL CONTRACEPTIVE: Primary | ICD-10-CM

## 2023-09-18 DIAGNOSIS — Z30.432 ENCOUNTER FOR IUD REMOVAL: ICD-10-CM

## 2023-09-18 PROCEDURE — 58301 REMOVAL OF IUD: ICD-10-PCS | Mod: S$PBB,,, | Performed by: OBSTETRICS & GYNECOLOGY

## 2023-09-18 PROCEDURE — 58301 REMOVE INTRAUTERINE DEVICE: CPT | Mod: PBBFAC | Performed by: OBSTETRICS & GYNECOLOGY

## 2023-09-18 RX ORDER — ETONOGESTREL AND ETHINYL ESTRADIOL VAGINAL RING .015; .12 MG/D; MG/D
1 RING VAGINAL
Qty: 3 EACH | Refills: 3 | Status: SHIPPED | OUTPATIENT
Start: 2023-09-18 | End: 2024-09-17

## 2023-09-18 NOTE — PROCEDURES
Removal of IUD    Date/Time: 9/18/2023 1:15 PM    Performed by: Duyen Haney MD  Authorized by: Duyen Haney MD    Consent obtained:  Written  Consent given by:  Patient  Procedure risks and benefits discussed: yes    Patient questions answered: yes    Patient agrees, verbalizes understanding, and wants to proceed: yes    Educational handouts given: no    Instructions and paperwork completed: no    Implant grasped by: ring forceps  Removal due to infection and inflammatory reaction: no    Other reason for removal:  Wants it removed to consider pregnancy  Removal due to mechanical complications of IUD/Nexplanon: no    Removed with no complications: yes     Wants Nuvaring for now  Previous classical c/s  Discussed in detail and she is sure she wants it removed

## 2023-09-27 ENCOUNTER — TELEPHONE (OUTPATIENT)
Dept: PSYCHIATRY | Facility: CLINIC | Age: 28
End: 2023-09-27
Payer: MEDICAID

## 2023-09-27 RX ORDER — DEXTROAMPHETAMINE SULFATE, DEXTROAMPHETAMINE SACCHARATE, AMPHETAMINE ASPARTATE MONOHYDRATE, AND AMPHETAMINE SULFATE 12.5; 12.5; 12.5; 12.5 MG/1; MG/1; MG/1; MG/1
50 CAPSULE, EXTENDED RELEASE ORAL DAILY
Qty: 30 EACH | Refills: 0 | Status: SHIPPED | OUTPATIENT
Start: 2023-09-27 | End: 2023-12-01 | Stop reason: ALTCHOICE

## 2023-09-27 NOTE — TELEPHONE ENCOUNTER
"Pt requests return call. Patient states "the medicine does not work at all" regarding Mydais 37.5 mg. She reports the first few days she was able to get some benefit- more focus, accomplishing tasks. She reports initial trouble sleeping but states "all I want to do is sleep." She reports some mind benefit with binge eating but states "I can still sit there and binge eat."    She denies ASE at the time.   She denies any benefit at this time and elects to increase to Mydais 50 mg.     She expresses understanding of risk of insomnia, anxiety, increased BP, decreased appetite with higher doses of stimulants.   "

## 2023-10-12 DIAGNOSIS — N76.6 VULVAR ULCER: ICD-10-CM

## 2023-10-12 RX ORDER — VALACYCLOVIR HYDROCHLORIDE 1 G/1
TABLET, FILM COATED ORAL
Qty: 90 TABLET | Refills: 0 | Status: SHIPPED | OUTPATIENT
Start: 2023-10-12 | End: 2024-03-21

## 2023-10-12 NOTE — TELEPHONE ENCOUNTER
"Refill Routing Note   Medication(s) are not appropriate for processing by [facility] for the following reason(s):      Required labs outdated  Clarification of medication (Rx) details (discrepancy from pharmacy)    [facility] action(s):  Defer Care Due:  None identified     Medication Therapy Plan: Directions indicated by the pharmacy appear different from last order. Ordered as "Take 2 every 12hrs then 1 daily for 7 days". Pharmacy wrote " Take 2 every 12hrs 'for 7 days' then 1 daily".      Appointments  past 12m or future 3m with PCP    Date Provider   Last Visit   9/18/2023 Duyne Haney MD   Next Visit   Visit date not found Duyen Haney MD   ED visits in past 90 days: 0        Note composed:11:56 AM 10/12/2023          "

## 2023-11-01 DIAGNOSIS — F90.2 ADHD (ATTENTION DEFICIT HYPERACTIVITY DISORDER), COMBINED TYPE: ICD-10-CM

## 2023-11-01 RX ORDER — LISDEXAMFETAMINE DIMESYLATE 70 MG/1
70 CAPSULE ORAL EVERY MORNING
Qty: 30 CAPSULE | Refills: 0 | Status: SHIPPED | OUTPATIENT
Start: 2023-11-01 | End: 2023-12-01 | Stop reason: SDUPTHER

## 2023-12-01 ENCOUNTER — OFFICE VISIT (OUTPATIENT)
Dept: PSYCHIATRY | Facility: CLINIC | Age: 28
End: 2023-12-01
Payer: MEDICAID

## 2023-12-01 DIAGNOSIS — F33.1 MODERATE EPISODE OF RECURRENT MAJOR DEPRESSIVE DISORDER: Primary | ICD-10-CM

## 2023-12-01 DIAGNOSIS — F41.1 GENERALIZED ANXIETY DISORDER: ICD-10-CM

## 2023-12-01 DIAGNOSIS — F90.2 ADHD (ATTENTION DEFICIT HYPERACTIVITY DISORDER), COMBINED TYPE: ICD-10-CM

## 2023-12-01 DIAGNOSIS — R63.2 BINGE EATING: ICD-10-CM

## 2023-12-01 PROCEDURE — 99214 OFFICE O/P EST MOD 30 MIN: CPT | Mod: SA,HB,95, | Performed by: PHYSICIAN ASSISTANT

## 2023-12-01 PROCEDURE — 1160F PR REVIEW ALL MEDS BY PRESCRIBER/CLIN PHARMACIST DOCUMENTED: ICD-10-PCS | Mod: SA,HB,CPTII,95 | Performed by: PHYSICIAN ASSISTANT

## 2023-12-01 PROCEDURE — 99214 PR OFFICE/OUTPT VISIT, EST, LEVL IV, 30-39 MIN: ICD-10-PCS | Mod: SA,HB,95, | Performed by: PHYSICIAN ASSISTANT

## 2023-12-01 PROCEDURE — 1159F MED LIST DOCD IN RCRD: CPT | Mod: SA,HB,CPTII,95 | Performed by: PHYSICIAN ASSISTANT

## 2023-12-01 PROCEDURE — 1159F PR MEDICATION LIST DOCUMENTED IN MEDICAL RECORD: ICD-10-PCS | Mod: SA,HB,CPTII,95 | Performed by: PHYSICIAN ASSISTANT

## 2023-12-01 PROCEDURE — 1160F RVW MEDS BY RX/DR IN RCRD: CPT | Mod: SA,HB,CPTII,95 | Performed by: PHYSICIAN ASSISTANT

## 2023-12-01 RX ORDER — HYDROXYZINE HYDROCHLORIDE 25 MG/1
25 TABLET, FILM COATED ORAL
Qty: 30 TABLET | Refills: 0 | Status: SHIPPED | OUTPATIENT
Start: 2023-12-01 | End: 2024-02-23 | Stop reason: SDUPTHER

## 2023-12-01 RX ORDER — LISDEXAMFETAMINE DIMESYLATE 70 MG/1
70 CAPSULE ORAL EVERY MORNING
Qty: 30 CAPSULE | Refills: 0 | Status: SHIPPED | OUTPATIENT
Start: 2024-02-04 | End: 2024-02-23 | Stop reason: SDUPTHER

## 2023-12-01 RX ORDER — LISDEXAMFETAMINE DIMESYLATE 70 MG/1
70 CAPSULE ORAL EVERY MORNING
Qty: 30 CAPSULE | Refills: 0 | Status: SHIPPED | OUTPATIENT
Start: 2023-12-04 | End: 2024-02-23 | Stop reason: SDUPTHER

## 2023-12-01 RX ORDER — VENLAFAXINE HYDROCHLORIDE 75 MG/1
75 CAPSULE, EXTENDED RELEASE ORAL DAILY
Qty: 90 CAPSULE | Refills: 1 | Status: SHIPPED | OUTPATIENT
Start: 2023-12-01 | End: 2024-02-23 | Stop reason: SDUPTHER

## 2023-12-01 RX ORDER — LISDEXAMFETAMINE DIMESYLATE 70 MG/1
70 CAPSULE ORAL EVERY MORNING
Qty: 30 CAPSULE | Refills: 0 | Status: SHIPPED | OUTPATIENT
Start: 2024-01-04 | End: 2024-02-23 | Stop reason: SDUPTHER

## 2023-12-01 NOTE — PROGRESS NOTES
"The patient location is: [facility]  The chief complaint leading to consultation is: f/u    Visit type: audiovisual    Face to Face time with patient: 15  22 minutes of total time spent on the encounter, which includes face to face time and non-face to face time preparing to see the patient (eg, review of tests), Obtaining and/or reviewing separately obtained history, Documenting clinical information in the electronic or other health record, Independently interpreting results (not separately reported) and communicating results to the patient/family/caregiver, or Care coordination (not separately reported).         Each patient to whom he or she provides medical services by telemedicine is:  (1) informed of the relationship between the physician and patient and the respective role of any other health care provider with respect to management of the patient; and (2) notified that he or she may decline to receive medical services by telemedicine and may withdraw from such care at any time.    Notes:     Outpatient Psychiatry Follow-Up Visit (PA)    12/1/2023    Clinical Status of Patient:  Outpatient (Ambulatory)    Chief Complaint:  Nina Montesinos is a 28 y.o. female who presents today for follow-up of depression, anxiety and attention problems.  Met with patient.      Current Medications:  Vyvanse 70 mg  Effexor 75 mg  Hydroxyzine 25 mg    Interval History and Content of Current Session:  Interim Events/Subjective Report/Content of Current Session:   Hammad last seen 8/25/2023    Patient is a 28 year old female with a psychiatric history of depression, anxiety, PTSD, ADHD, and grief after losing a child.     Patient presents to follow up today after trial of mydais 50 mg. She has since switched back to Vyvanse 70 mg.    Patient states she is "much better now that I'm back on my vyvanse." She is more able to accomplish tasks, can focus on one thing at a time, feels less overwhelmed with parenting, is not " "binge eating, and is "happier."    She reports her mood has been good, "happier".  She denies depression.     She endorses increased anxiety around the holidays, anniversary of one of her daughter's death.   She states this time of year is the most difficult. She states she has learned  "to be at peace that she's not here"  but that she has difficulty explaining the death to her living child. She states "I don't know how to handle it" when her living child asks questions, reports this is anxiety provoking.     She notes that her generalized anxiety is better. She feels more situational anxiety this time of year. She takes hydroxyzine prn which helps.     She feels the effexor is working well. She is content with Effexor 75 mg, is not wanting to increase.     She denies ASE from medications. She denies tachycardia, palpitations, or chest pain.     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     She is not binge eating since restarting vyvanse 70 mg.  She is sleeping "much better." She is falling asleep, staying asleep, feels well rested in the morning.     Patient's affect is brighter in today's appointment.       Psychotherapy:  Target symptoms: distractability, lack of focus, anxiety , binge eating  Why chosen therapy is appropriate versus another modality: relevant to diagnosis, evidence based practice  Outcome monitoring methods: self-report  Therapeutic intervention type: supportive psychotherapy  Topics discussed/themes: building skills sets for symptom management, symptom recognition  The patient's response to the intervention is accepting. The patient's progress toward treatment goals is good.   Duration of intervention: 10 minutes.    Review of Systems   PSYCHIATRIC: Pertinant items are noted in the narrative.    Past Medication Trials:  Adderall-migraines  Mydais    Past Medical, Family and Social History: The patient's past medical, family and social history have " been reviewed and updated as appropriate within the electronic medical record - see encounter notes.    Compliance: yes    Side effects: None    Risk Parameters:  Patient reports no suicidal ideation  Patient reports no homicidal ideation  Patient reports no self-injurious behavior  Patient reports no violent behavior    Exam (detailed: at least 9 elements; comprehensive: all 15 elements)   Constitutional  Vitals:  Most recent vital signs, dated less than 90 days prior to this appointment, were reviewed.   There were no vitals filed for this visit.       General:  unremarkable, age appropriate, casually dressed     Musculoskeletal  Muscle Strength/Tone:  not examined   Gait & Station:  virtual     Psychiatric  Speech:  no latency; no press   Mood & Affect:  steady  congruent and appropriate   Thought Process:  normal and logical   Associations:  intact   Thought Content:  normal, no suicidality, no homicidality, delusions, or paranoia   Insight:  intact   Judgement: behavior is adequate to circumstances   Orientation:  grossly intact   Memory: intact for content of interview   Language: grossly intact   Attention Span & Concentration:  able to focus   Fund of Knowledge:  intact and appropriate to age and level of education     Assessment and Diagnosis   Status/Progress: Based on the examination today, the patient's problem(s) is/are improved.  New problems have not been presented today.   Lack of compliance complicating management of the primary condition.  There are no active rule-out diagnoses for this patient at this time.     General Impression: Patient is a 28 year old female with a psychiatric history of XIOMARA, MDD, ADHD, PTSD, and grief after loss of a child. Patient reports benefit with effexor 75 mg, Vyvanse 70 mg, and hydroxyzine 25 mg prn.     She is stable at this time      ICD-10-CM ICD-9-CM   1. Moderate episode of recurrent major depressive disorder  F33.1 296.32   2. ADHD (attention deficit  hyperactivity disorder), combined type  F90.2 314.01   3. Generalized anxiety disorder  F41.1 300.02   4. Binge eating  R63.2 783.6           Intervention/Counseling/Treatment Plan   Medication Management: The risks and benefits of medication were discussed with the patient.   Continue Effexor 75 mg daily  Continue Vyvanse 70 mg  Continue hydroxyzine 25 mg prn  Discussed with patient informed consent, risks vs. benefits, alternative treatments, side effect profile and the inherent unpredictability of individual responses to these treatments. The patient expresses understanding of the above and displays the capacity to agree with this current plan and had no other questions.  Encouraged patient to keep future appointments.   Encouraged patient to message or call with questions or concerns  Safety plan reviewed with patient for worsening condition or suicidal ideations. In the event of an emergency patient was advised to go to the emergency room.      Return to Clinic: 3 months

## 2024-02-23 ENCOUNTER — OFFICE VISIT (OUTPATIENT)
Dept: PSYCHIATRY | Facility: CLINIC | Age: 29
End: 2024-02-23
Payer: MEDICAID

## 2024-02-23 DIAGNOSIS — F90.2 ADHD (ATTENTION DEFICIT HYPERACTIVITY DISORDER), COMBINED TYPE: ICD-10-CM

## 2024-02-23 DIAGNOSIS — R63.2 BINGE EATING: Primary | ICD-10-CM

## 2024-02-23 DIAGNOSIS — F33.1 MODERATE EPISODE OF RECURRENT MAJOR DEPRESSIVE DISORDER: ICD-10-CM

## 2024-02-23 DIAGNOSIS — F41.1 GENERALIZED ANXIETY DISORDER: ICD-10-CM

## 2024-02-23 PROCEDURE — 1160F RVW MEDS BY RX/DR IN RCRD: CPT | Mod: CPTII,95,, | Performed by: PHYSICIAN ASSISTANT

## 2024-02-23 PROCEDURE — 1159F MED LIST DOCD IN RCRD: CPT | Mod: CPTII,95,, | Performed by: PHYSICIAN ASSISTANT

## 2024-02-23 PROCEDURE — 99214 OFFICE O/P EST MOD 30 MIN: CPT | Mod: SA,HB,95, | Performed by: PHYSICIAN ASSISTANT

## 2024-02-23 RX ORDER — LISDEXAMFETAMINE DIMESYLATE 70 MG/1
70 CAPSULE ORAL EVERY MORNING
Qty: 30 CAPSULE | Refills: 0 | Status: SHIPPED | OUTPATIENT
Start: 2024-03-09 | End: 2024-05-24 | Stop reason: SDUPTHER

## 2024-02-23 RX ORDER — VENLAFAXINE HYDROCHLORIDE 75 MG/1
75 CAPSULE, EXTENDED RELEASE ORAL DAILY
Qty: 90 CAPSULE | Refills: 1 | Status: SHIPPED | OUTPATIENT
Start: 2024-02-23 | End: 2024-05-24 | Stop reason: SDUPTHER

## 2024-02-23 RX ORDER — HYDROXYZINE HYDROCHLORIDE 25 MG/1
25 TABLET, FILM COATED ORAL
Qty: 30 TABLET | Refills: 0 | Status: SHIPPED | OUTPATIENT
Start: 2024-02-23 | End: 2024-02-27 | Stop reason: SDUPTHER

## 2024-02-23 RX ORDER — HYDROXYZINE HYDROCHLORIDE 25 MG/1
25 TABLET, FILM COATED ORAL
Qty: 30 TABLET | Refills: 0 | OUTPATIENT
Start: 2024-02-23

## 2024-02-23 RX ORDER — LISDEXAMFETAMINE DIMESYLATE 70 MG/1
70 CAPSULE ORAL EVERY MORNING
Qty: 30 CAPSULE | Refills: 0 | Status: SHIPPED | OUTPATIENT
Start: 2024-04-09 | End: 2024-04-11 | Stop reason: SDUPTHER

## 2024-02-23 RX ORDER — LISDEXAMFETAMINE DIMESYLATE 70 MG/1
70 CAPSULE ORAL EVERY MORNING
Qty: 30 CAPSULE | Refills: 0 | Status: SHIPPED | OUTPATIENT
Start: 2024-05-09 | End: 2024-05-24 | Stop reason: SDUPTHER

## 2024-02-23 NOTE — PROGRESS NOTES
"The patient location is: [facility]  The chief complaint leading to consultation is: f/u    Visit type: audiovisual    Face to Face time with patient: 15  22 minutes of total time spent on the encounter, which includes face to face time and non-face to face time preparing to see the patient (eg, review of tests), Obtaining and/or reviewing separately obtained history, Documenting clinical information in the electronic or other health record, Independently interpreting results (not separately reported) and communicating results to the patient/family/caregiver, or Care coordination (not separately reported).         Each patient to whom he or she provides medical services by telemedicine is:  (1) informed of the relationship between the physician and patient and the respective role of any other health care provider with respect to management of the patient; and (2) notified that he or she may decline to receive medical services by telemedicine and may withdraw from such care at any time.    Notes:     Outpatient Psychiatry Follow-Up Visit (PA)    2/23/2024    Clinical Status of Patient:  Outpatient (Ambulatory)    Chief Complaint:  Nina Montesinos is a 28 y.o. female who presents today for follow-up of depression, anxiety and attention problems.  Met with patient.      Current Medications:  Vyvanse 70 mg  Effexor 75 mg  Hydroxyzine 25 mg    Interval History and Content of Current Session:  Interim Events/Subjective Report/Content of Current Session:   Hammad last seen 12/1/2023    Patient is a 28 year old female with a psychiatric history of depression, anxiety, PTSD, ADHD, and grief after losing a child.     Patient presents to follow up today stating she's been "alright."    Her mood has been  "okay... not great" due to recent stressors with daughter who has been "acting out" since her dad recently got engageed.   She reports this has been "a lot of change for her" and she has consequently been "acting " "out", stating her behavior is worse when she gets home from staying with her dad.   She continues to endorse benefit in depression and anxiety with Effexor and is not wanting to make any change.     She reports her anxiety has improved post holidays. However, she endorses anxiety regarding a "difficult conversation" with her ex regarding her daughter and the impact her stay at his house has on her behavior at school and home.   She feels this anxiety is appropriate and continues to report benefit when taking hydroxyzine 25 mg prn for anxiety.     She reports she recently stopped taking OCP and states "I've felt better... a lot better" since getting off the birth control. However, she reports increase in binging since stopping.   She notes the increased binging occurs during  "certain times of the month" since stopping OPC and is able to identify this is likely due to fluctuations in hormones.     Her sleeping fluctuates.     Patient feels she is doing well, is not wanting to make any med changes. She reports benefit with Effexor 75 mg and is not wanting to make dose adjustment.   She continues to endorse benefit with vyvanse 70 mg in binge eating and concentration.     She denies ASE from medications.     She denies SI/self harm/HI.       Psychotherapy:  Target symptoms: distractability, lack of focus, anxiety , binge eating  Why chosen therapy is appropriate versus another modality: relevant to diagnosis, evidence based practice  Outcome monitoring methods: self-report  Therapeutic intervention type: supportive psychotherapy  Topics discussed/themes: building skills sets for symptom management, symptom recognition  The patient's response to the intervention is accepting. The patient's progress toward treatment goals is good.   Duration of intervention: 12 minutes.    Review of Systems   PSYCHIATRIC: Pertinant items are noted in the narrative.    Past Medication Trials:  Adderall-migraines  Mydais    Past Medical, " Family and Social History: The patient's past medical, family and social history have been reviewed and updated as appropriate within the electronic medical record - see encounter notes.    Compliance: yes    Side effects: None    Risk Parameters:  Patient reports no suicidal ideation  Patient reports no homicidal ideation  Patient reports no self-injurious behavior  Patient reports no violent behavior    Exam (detailed: at least 9 elements; comprehensive: all 15 elements)   Constitutional  Vitals:  Most recent vital signs, dated greater than 90 days prior to this appointment, were reviewed.   There were no vitals filed for this visit.       General:  unremarkable, age appropriate, casually dressed     Musculoskeletal  Muscle Strength/Tone:  not examined   Gait & Station:  virtual     Psychiatric  Speech:  no latency; no press   Mood & Affect:  steady  congruent and appropriate   Thought Process:  normal and logical   Associations:  intact   Thought Content:  normal, no suicidality, no homicidality, delusions, or paranoia   Insight:  intact   Judgement: behavior is adequate to circumstances   Orientation:  grossly intact   Memory: intact for content of interview   Language: grossly intact   Attention Span & Concentration:  able to focus   Fund of Knowledge:  intact and appropriate to age and level of education     Assessment and Diagnosis   Status/Progress: Based on the examination today, the patient's problem(s) is/are improved.  New problems have been presented today.   Lack of compliance complicating management of the primary condition.  There are no active rule-out diagnoses for this patient at this time.     General Impression: Patient is a 28 year old female with a psychiatric history of XIOMARA, MDD, ADHD, PTSD, and grief after loss of a child. Patient reports benefit with effexor 75 mg, Vyvanse 70 mg, and hydroxyzine 25 mg prn. She endorses some increase in binging since stopping OCP, will give more time for  hormones to normalize.     She is stable at this time      ICD-10-CM ICD-9-CM   1. Binge eating  R63.2 783.6   2. ADHD (attention deficit hyperactivity disorder), combined type  F90.2 314.01   3. Generalized anxiety disorder  F41.1 300.02   4. Moderate episode of recurrent major depressive disorder  F33.1 296.32             Intervention/Counseling/Treatment Plan   Medication Management: The risks and benefits of medication were discussed with the patient.   Continue Effexor 75 mg daily  Continue Vyvanse 70 mg  Continue hydroxyzine 25 mg prn  Discussed with patient informed consent, risks vs. benefits, alternative treatments, side effect profile and the inherent unpredictability of individual responses to these treatments. The patient expresses understanding of the above and displays the capacity to agree with this current plan and had no other questions.  Encouraged patient to keep future appointments.   Encouraged patient to message or call with questions or concerns  Safety plan reviewed with patient for worsening condition or suicidal ideations. In the event of an emergency patient was advised to go to the emergency room.      Return to Clinic: 3 months

## 2024-02-27 RX ORDER — HYDROXYZINE HYDROCHLORIDE 25 MG/1
25 TABLET, FILM COATED ORAL DAILY PRN
Qty: 30 TABLET | Refills: 0 | Status: SHIPPED | OUTPATIENT
Start: 2024-02-27 | End: 2024-05-24 | Stop reason: DRUGHIGH

## 2024-03-20 DIAGNOSIS — N76.6 VULVAR ULCER: ICD-10-CM

## 2024-03-21 DIAGNOSIS — N76.6 VULVAR ULCER: ICD-10-CM

## 2024-03-21 RX ORDER — VALACYCLOVIR HYDROCHLORIDE 1 G/1
TABLET, FILM COATED ORAL
Qty: 90 TABLET | Refills: 0 | Status: SHIPPED | OUTPATIENT
Start: 2024-03-21

## 2024-03-21 RX ORDER — VALACYCLOVIR HYDROCHLORIDE 1 G/1
TABLET, FILM COATED ORAL
Qty: 90 TABLET | Refills: 0 | Status: SHIPPED | OUTPATIENT
Start: 2024-03-21 | End: 2024-03-21 | Stop reason: SDUPTHER

## 2024-03-21 NOTE — TELEPHONE ENCOUNTER
Refill Routing Note   Medication(s) are not appropriate for processing by [facility] for the following reason(s):        Required labs outdated    [facility] action(s):  Defer               Appointments  past 12m or future 3m with PCP    Date Provider   Last Visit   9/18/2023 Duyen Haney MD   Next Visit   Visit date not found Duyen Haney MD   ED visits in past 90 days: 0        Note composed:8:11 AM 03/21/2024

## 2024-04-11 DIAGNOSIS — F90.2 ADHD (ATTENTION DEFICIT HYPERACTIVITY DISORDER), COMBINED TYPE: ICD-10-CM

## 2024-04-11 RX ORDER — LISDEXAMFETAMINE DIMESYLATE 70 MG/1
70 CAPSULE ORAL EVERY MORNING
Qty: 30 CAPSULE | Refills: 0 | Status: SHIPPED | OUTPATIENT
Start: 2024-04-11 | End: 2024-05-24 | Stop reason: SDUPTHER

## 2024-05-23 NOTE — PROGRESS NOTES
"The patient location is: [facility]  The chief complaint leading to consultation is: f/u    Visit type: audiovisual    Face to Face time with patient: 15  22 minutes of total time spent on the encounter, which includes face to face time and non-face to face time preparing to see the patient (eg, review of tests), Obtaining and/or reviewing separately obtained history, Documenting clinical information in the electronic or other health record, Independently interpreting results (not separately reported) and communicating results to the patient/family/caregiver, or Care coordination (not separately reported).         Each patient to whom he or she provides medical services by telemedicine is:  (1) informed of the relationship between the physician and patient and the respective role of any other health care provider with respect to management of the patient; and (2) notified that he or she may decline to receive medical services by telemedicine and may withdraw from such care at any time.    Notes:     Outpatient Psychiatry Follow-Up Visit (PA)    5/24/2024    Clinical Status of Patient:  Outpatient (Ambulatory)    Chief Complaint:  Nina Montesinos is a 28 y.o. female who presents today for follow-up of depression, anxiety and attention problems.  Met with patient.      Current Medications:  Vyvanse 70 mg  Effexor 75 mg  Hydroxyzine 25 mg    Interval History and Content of Current Session:  Interim Events/Subjective Report/Content of Current Session:   Hammad last seen 2/23/2024    Patient is a 28 year old female with a psychiatric history of depression, anxiety, PTSD, ADHD, and grief after losing a child.     Patient presents to follow up today reports recent stressors as her daughter was diagnosed with ASD and is looking for school for [employer], [employer] services.   She reports recent anxiety as she will be talking to her daughter's father about wanting full custody, stating "its not in her best interest to go " "there"  She reports she "always knew" daughter was on the spectrum but recently received the diagnosis.     Her mood has been "not bad honestly." She endorses low energy, low motivation but denies sadness, tearfulness, hopelessness, states   "feelings wise, I don't think I've been down".    Her anxiety has been okay, "not horrible, not as bad as it could be". She reports her daughter is often anxious, which prompts anxiety. She would like to try lower dose hydroxyzine as needed for anxiety, as she reports hydroxyzine works well but endorses some fatigue.   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     She continues to endorse benefit with effexor 75 mg, does not feel she needs to increase the dose, reports 75 mg managing depression and anxiety well.     She reports her last scripts for vyvanse was generic and didn't work as well, requests brand vyvanse for future prescriptions.     She reports increased binge eating due to increased stress. She reports vyvanse helps during the day but increased binge eating at night once vyvanse has work off. She is able to identify the binge eating "has to do with my feelings about everything". Provided resource for registered dietician, eating disorder services.     Her sleeping is "okay."    Patient is doing well, reports increased stress but managing well. No med adjustments necessary at this time.   No SI/HI/self harm      Psychotherapy:  Target symptoms: anxiety , binge eating  Why chosen therapy is appropriate versus another modality: relevant to diagnosis, evidence based practice  Outcome monitoring methods: self-report  Therapeutic intervention type: supportive psychotherapy  Topics discussed/themes: building skills sets for symptom management, symptom recognition  The patient's response to the intervention is accepting. The patient's progress toward treatment goals is good.   Duration of intervention: 15 " minutes.    Review of Systems   PSYCHIATRIC: Pertinant items are noted in the narrative.    Past Medication Trials:  Adderall-migraines  Mydais    Past Medical, Family and Social History: The patient's past medical, family and social history have been reviewed and updated as appropriate within the electronic medical record - see encounter notes.    Compliance: yes    Side effects: None    Risk Parameters:  Patient reports no suicidal ideation  Patient reports no homicidal ideation  Patient reports no self-injurious behavior  Patient reports no violent behavior    Exam (detailed: at least 9 elements; comprehensive: all 15 elements)   Constitutional  Vitals:  Most recent vital signs, dated greater than 90 days prior to this appointment, were reviewed.   There were no vitals filed for this visit.       General:  unremarkable, age appropriate, casually dressed     Musculoskeletal  Muscle Strength/Tone:  not examined   Gait & Station:  virtual     Psychiatric  Speech:  no latency; no press   Mood & Affect:  steady  congruent and appropriate   Thought Process:  normal and logical   Associations:  intact   Thought Content:  normal, no suicidality, no homicidality, delusions, or paranoia   Insight:  intact   Judgement: behavior is adequate to circumstances   Orientation:  grossly intact   Memory: intact for content of interview   Language: grossly intact   Attention Span & Concentration:  able to focus   Fund of Knowledge:  intact and appropriate to age and level of education     Assessment and Diagnosis   Status/Progress: Based on the examination today, the patient's problem(s) is/are improved and adequately but not ideally controlled.  New problems have not been presented today.   Lack of compliance are not complicating management of the primary condition.  There are no active rule-out diagnoses for this patient at this time.     General Impression: Patient is a 28 year old female with a psychiatric history of XIOMARA, MDD,  ADHD, PTSD, and grief after loss of a child. Patient reports benefit with effexor 75 mg, Vyvanse 70 mg, and hydroxyzine 25 mg prn. She endorses some increased anxiety due to stressors and requests to trial hydroxyzine at lower dose prn, as she reports benefit but fatigue. Provided resources for binge eating, resource for registered dietician.     She is stable at this time      ICD-10-CM ICD-9-CM   1. Generalized anxiety disorder  F41.1 300.02   2. ADHD (attention deficit hyperactivity disorder), combined type  F90.2 314.01   3. Moderate episode of recurrent major depressive disorder  F33.1 296.32   4. Binge eating  R63.2 783.6               Intervention/Counseling/Treatment Plan   Medication Management: The risks and benefits of medication were discussed with the patient.   Continue Effexor 75 mg daily  Continue Vyvanse 70 mg  Decrease to  hydroxyzine 10 mg prn  Labs ordered: cbc, cmp, tsh, b12, iron and tibc  Resources provided for binge eating  Discussed with patient informed consent, risks vs. benefits, alternative treatments, side effect profile and the inherent unpredictability of individual responses to these treatments. The patient expresses understanding of the above and displays the capacity to agree with this current plan and had no other questions.  Encouraged patient to keep future appointments.   Encouraged patient to message or call with questions or concerns  Safety plan reviewed with patient for worsening condition or suicidal ideations. In the event of an emergency patient was advised to go to the emergency room.      Return to Clinic: 3 months

## 2024-05-24 ENCOUNTER — OFFICE VISIT (OUTPATIENT)
Dept: PSYCHIATRY | Facility: CLINIC | Age: 29
End: 2024-05-24
Payer: MEDICAID

## 2024-05-24 DIAGNOSIS — R63.2 BINGE EATING: ICD-10-CM

## 2024-05-24 DIAGNOSIS — F41.1 GENERALIZED ANXIETY DISORDER: Primary | ICD-10-CM

## 2024-05-24 DIAGNOSIS — F90.2 ADHD (ATTENTION DEFICIT HYPERACTIVITY DISORDER), COMBINED TYPE: ICD-10-CM

## 2024-05-24 DIAGNOSIS — F33.1 MODERATE EPISODE OF RECURRENT MAJOR DEPRESSIVE DISORDER: ICD-10-CM

## 2024-05-24 PROCEDURE — 99214 OFFICE O/P EST MOD 30 MIN: CPT | Mod: SA,HB,95, | Performed by: PHYSICIAN ASSISTANT

## 2024-05-24 PROCEDURE — 1159F MED LIST DOCD IN RCRD: CPT | Mod: CPTII,95,, | Performed by: PHYSICIAN ASSISTANT

## 2024-05-24 PROCEDURE — 1160F RVW MEDS BY RX/DR IN RCRD: CPT | Mod: CPTII,95,, | Performed by: PHYSICIAN ASSISTANT

## 2024-05-24 RX ORDER — VENLAFAXINE HYDROCHLORIDE 75 MG/1
75 CAPSULE, EXTENDED RELEASE ORAL DAILY
Qty: 90 CAPSULE | Refills: 1 | Status: SHIPPED | OUTPATIENT
Start: 2024-05-24 | End: 2024-11-20

## 2024-05-24 RX ORDER — HYDROXYZINE HYDROCHLORIDE 10 MG/1
10 TABLET, FILM COATED ORAL 3 TIMES DAILY PRN
Qty: 30 TABLET | Refills: 1 | Status: SHIPPED | OUTPATIENT
Start: 2024-05-24

## 2024-05-24 RX ORDER — LISDEXAMFETAMINE DIMESYLATE 70 MG/1
70 CAPSULE ORAL EVERY MORNING
Qty: 30 CAPSULE | Refills: 0 | Status: SHIPPED | OUTPATIENT
Start: 2024-07-13

## 2024-05-24 RX ORDER — LISDEXAMFETAMINE DIMESYLATE 70 MG/1
70 CAPSULE ORAL EVERY MORNING
Qty: 30 CAPSULE | Refills: 0 | Status: SHIPPED | OUTPATIENT
Start: 2024-08-13

## 2024-05-24 RX ORDER — LISDEXAMFETAMINE DIMESYLATE 70 MG/1
70 CAPSULE ORAL EVERY MORNING
Qty: 30 CAPSULE | Refills: 0 | Status: SHIPPED | OUTPATIENT
Start: 2024-06-13

## 2024-06-26 DIAGNOSIS — N76.6 VULVAR ULCER: ICD-10-CM

## 2024-06-26 RX ORDER — VALACYCLOVIR HYDROCHLORIDE 1 G/1
TABLET, FILM COATED ORAL
Qty: 90 TABLET | Refills: 0 | Status: SHIPPED | OUTPATIENT
Start: 2024-06-26

## 2024-06-26 NOTE — TELEPHONE ENCOUNTER
Refill Routing Note   Medication(s) are not appropriate for processing by [facility] for the following reason(s):        Patient not seen by provider within 15 months: future office visit 9/12/24   Required labs outdated: CBC & CMP    [facility] action(s):  Defer               Appointments  past 12m or future 3m with PCP    Date Provider   Last Visit   9/18/2023 Duyen Haney MD   Next Visit   9/12/2024 Duyen Haney MD   ED visits in past 90 days: 0        Note composed:12:29 PM 06/26/2024

## 2024-07-05 ENCOUNTER — OFFICE VISIT (OUTPATIENT)
Dept: URGENT CARE | Facility: CLINIC | Age: 29
End: 2024-07-05
Payer: MEDICAID

## 2024-07-05 VITALS
BODY MASS INDEX: 32.89 KG/M2 | OXYGEN SATURATION: 100 % | TEMPERATURE: 98 F | RESPIRATION RATE: 18 BRPM | HEIGHT: 61 IN | DIASTOLIC BLOOD PRESSURE: 87 MMHG | HEART RATE: 99 BPM | SYSTOLIC BLOOD PRESSURE: 125 MMHG | WEIGHT: 174.19 LBS

## 2024-07-05 DIAGNOSIS — K11.21 ACUTE BACTERIAL SIALADENITIS: Primary | ICD-10-CM

## 2024-07-05 DIAGNOSIS — B96.89 ACUTE BACTERIAL SIALADENITIS: Primary | ICD-10-CM

## 2024-07-05 RX ORDER — AMOXICILLIN AND CLAVULANATE POTASSIUM 875; 125 MG/1; MG/1
1 TABLET, FILM COATED ORAL EVERY 12 HOURS
Qty: 14 TABLET | Refills: 0 | Status: SHIPPED | OUTPATIENT
Start: 2024-07-05 | End: 2024-07-12

## 2024-07-05 NOTE — PROGRESS NOTES
"Subjective:      Patient ID: Nina Montesinos is a 28 y.o. female.    Vitals:  height is 5' 1" (1.549 m) and weight is 79 kg (174 lb 2.6 oz). Her oral temperature is 98.2 °F (36.8 °C). Her blood pressure is 125/87 and her pulse is 99. Her respiration is 18 and oxygen saturation is 100%.     Chief Complaint: Jaw Pain    Pt present with RT jaw pain, hurts to swallow. started yesterday. Tx include tylenol. Pain 03/10    Oral Pain   This is a new problem. The current episode started yesterday. The problem occurs constantly. The problem has been gradually worsening. The pain is at a severity of 3/10. The pain is mild. Associated symptoms include difficulty swallowing, facial pain and oral bleeding. She has tried acetaminophen for the symptoms. The treatment provided no relief.   ROS   Objective:     Physical Exam   Constitutional: She is oriented to person, place, and time. She appears well-developed. She is cooperative.  Non-toxic appearance. She does not appear ill. No distress.   HENT:   Head: Normocephalic and atraumatic.       Ears:   Right Ear: Hearing, tympanic membrane, external ear and ear canal normal.   Left Ear: Hearing, tympanic membrane, external ear and ear canal normal.   Nose: Nose normal. No mucosal edema, rhinorrhea or nasal deformity. No epistaxis. Right sinus exhibits no maxillary sinus tenderness and no frontal sinus tenderness. Left sinus exhibits no maxillary sinus tenderness and no frontal sinus tenderness.   Mouth/Throat: Uvula is midline, oropharynx is clear and moist and mucous membranes are normal. No trismus in the jaw. Normal dentition. No uvula swelling. No oropharyngeal exudate, posterior oropharyngeal edema or posterior oropharyngeal erythema.   Eyes: Conjunctivae and lids are normal. No scleral icterus.   Neck: Trachea normal and phonation normal. Neck supple. No edema present. No erythema present. No neck rigidity present.   Cardiovascular: Normal rate, regular rhythm, normal heart " sounds and normal pulses.   Pulmonary/Chest: Effort normal and breath sounds normal. No respiratory distress. She has no decreased breath sounds. She has no rhonchi.   Abdominal: Normal appearance.   Musculoskeletal: Normal range of motion.         General: No deformity. Normal range of motion.   Neurological: She is alert and oriented to person, place, and time. She exhibits normal muscle tone. Coordination normal.   Skin: Skin is warm, dry, intact, not diaphoretic and not pale.   Psychiatric: Her speech is normal and behavior is normal. Judgment and thought content normal.   Nursing note and vitals reviewed.      Assessment:     1. Acute bacterial sialadenitis        Plan:       Acute bacterial sialadenitis  -     amoxicillin-clavulanate 875-125mg (AUGMENTIN) 875-125 mg per tablet; Take 1 tablet by mouth every 12 (twelve) hours. for 7 days  Dispense: 14 tablet; Refill: 0    Thank you for choosing [facility]!     Our goal in the Urgent Care is to always provide outstanding medical care. You may receive a survey by mail or e-mail in the next week regarding your experience today. We would greatly appreciate you completing and returning the survey. Your feedback provides us with a way to recognize our staff who provide very good care, and it helps us learn how to improve when your experience was below our aspiration of excellence.       We appreciate you trusting us with your medical care. We hope you feel better soon. We will be happy to take care of you for all of your future medical needs.  You must understand that you've received an Urgent Care treatment only and that you may be released before all your medical problems are known or treated. You, the patient, will arrange for follow up care as instructed.  Follow up with your PCP or specialty clinic as directed in the next 1-2 weeks if not improved or as needed.  You can call [phone] to schedule an appointment with the appropriate  provider.  Another option is to follow up with [email or web address] ([email or web address]) virtually for quick simple medical advice.  If your condition worsens we recommend that you receive another evaluation at the emergency room immediately or contact your primary medical clinics after hours call service to discuss your concerns.  Please return here or go to the Emergency Department for any concerns or worsening of condition.      *If you were prescribed a narcotic or controlled medication, do not drive or operate heavy equipment or machinery while taking these medications.

## 2024-07-08 ENCOUNTER — LAB VISIT (OUTPATIENT)
Dept: LAB | Facility: HOSPITAL | Age: 29
End: 2024-07-08
Attending: PHYSICIAN ASSISTANT
Payer: MEDICAID

## 2024-07-08 DIAGNOSIS — F33.1 MODERATE EPISODE OF RECURRENT MAJOR DEPRESSIVE DISORDER: ICD-10-CM

## 2024-07-08 LAB
ALBUMIN SERPL BCP-MCNC: 4 G/DL (ref 3.5–5.2)
ALP SERPL-CCNC: 53 U/L (ref 55–135)
ALT SERPL W/O P-5'-P-CCNC: 40 U/L (ref 10–44)
ANION GAP SERPL CALC-SCNC: 11 MMOL/L (ref 8–16)
AST SERPL-CCNC: 23 U/L (ref 10–40)
BASOPHILS # BLD AUTO: 0.04 K/UL (ref 0–0.2)
BASOPHILS NFR BLD: 0.6 % (ref 0–1.9)
BILIRUB SERPL-MCNC: 0.3 MG/DL (ref 0.1–1)
BUN SERPL-MCNC: 10 MG/DL (ref 6–20)
CALCIUM SERPL-MCNC: 9.6 MG/DL (ref 8.7–10.5)
CHLORIDE SERPL-SCNC: 103 MMOL/L (ref 95–110)
CO2 SERPL-SCNC: 22 MMOL/L (ref 23–29)
CREAT SERPL-MCNC: 0.8 MG/DL (ref 0.5–1.4)
DIFFERENTIAL METHOD BLD: ABNORMAL
EOSINOPHIL # BLD AUTO: 0.1 K/UL (ref 0–0.5)
EOSINOPHIL NFR BLD: 1.7 % (ref 0–8)
ERYTHROCYTE [DISTWIDTH] IN BLOOD BY AUTOMATED COUNT: 12.1 % (ref 11.5–14.5)
EST. GFR  (NO RACE VARIABLE): >60 ML/MIN/1.73 M^2
GLUCOSE SERPL-MCNC: 75 MG/DL (ref 70–110)
HCT VFR BLD AUTO: 40.9 % (ref 37–48.5)
HGB BLD-MCNC: 13.8 G/DL (ref 12–16)
IMM GRANULOCYTES # BLD AUTO: 0.02 K/UL (ref 0–0.04)
IMM GRANULOCYTES NFR BLD AUTO: 0.3 % (ref 0–0.5)
IRON SERPL-MCNC: 64 UG/DL (ref 30–160)
LYMPHOCYTES # BLD AUTO: 2.5 K/UL (ref 1–4.8)
LYMPHOCYTES NFR BLD: 39 % (ref 18–48)
MCH RBC QN AUTO: 32.5 PG (ref 27–31)
MCHC RBC AUTO-ENTMCNC: 33.7 G/DL (ref 32–36)
MCV RBC AUTO: 96 FL (ref 82–98)
MONOCYTES # BLD AUTO: 0.5 K/UL (ref 0.3–1)
MONOCYTES NFR BLD: 7.4 % (ref 4–15)
NEUTROPHILS # BLD AUTO: 3.2 K/UL (ref 1.8–7.7)
NEUTROPHILS NFR BLD: 51 % (ref 38–73)
NRBC BLD-RTO: 0 /100 WBC
PLATELET # BLD AUTO: 275 K/UL (ref 150–450)
PMV BLD AUTO: 11.2 FL (ref 9.2–12.9)
POTASSIUM SERPL-SCNC: 4.4 MMOL/L (ref 3.5–5.1)
PROT SERPL-MCNC: 7.3 G/DL (ref 6–8.4)
RBC # BLD AUTO: 4.25 M/UL (ref 4–5.4)
SATURATED IRON: 17 % (ref 20–50)
SODIUM SERPL-SCNC: 136 MMOL/L (ref 136–145)
TOTAL IRON BINDING CAPACITY: 383 UG/DL (ref 250–450)
TRANSFERRIN SERPL-MCNC: 259 MG/DL (ref 200–375)
TSH SERPL DL<=0.005 MIU/L-ACNC: 0.78 UIU/ML (ref 0.4–4)
VIT B12 SERPL-MCNC: 348 PG/ML (ref 210–950)
WBC # BLD AUTO: 6.33 K/UL (ref 3.9–12.7)

## 2024-07-08 PROCEDURE — 83540 ASSAY OF IRON: CPT | Performed by: PHYSICIAN ASSISTANT

## 2024-07-08 PROCEDURE — 36415 COLL VENOUS BLD VENIPUNCTURE: CPT | Performed by: PHYSICIAN ASSISTANT

## 2024-07-08 PROCEDURE — 84443 ASSAY THYROID STIM HORMONE: CPT | Performed by: PHYSICIAN ASSISTANT

## 2024-07-08 PROCEDURE — 80053 COMPREHEN METABOLIC PANEL: CPT | Performed by: PHYSICIAN ASSISTANT

## 2024-07-08 PROCEDURE — 82607 VITAMIN B-12: CPT | Performed by: PHYSICIAN ASSISTANT

## 2024-07-08 PROCEDURE — 85025 COMPLETE CBC W/AUTO DIFF WBC: CPT | Performed by: PHYSICIAN ASSISTANT

## 2024-08-08 DIAGNOSIS — F41.1 GENERALIZED ANXIETY DISORDER: ICD-10-CM

## 2024-08-08 DIAGNOSIS — F33.1 MODERATE EPISODE OF RECURRENT MAJOR DEPRESSIVE DISORDER: ICD-10-CM

## 2024-08-08 RX ORDER — VENLAFAXINE HYDROCHLORIDE 75 MG/1
75 CAPSULE, EXTENDED RELEASE ORAL
Qty: 90 CAPSULE | Refills: 1 | Status: SHIPPED | OUTPATIENT
Start: 2024-08-08

## 2024-08-26 ENCOUNTER — OFFICE VISIT (OUTPATIENT)
Dept: PSYCHIATRY | Facility: CLINIC | Age: 29
End: 2024-08-26
Payer: MEDICAID

## 2024-08-26 DIAGNOSIS — F41.1 GENERALIZED ANXIETY DISORDER: ICD-10-CM

## 2024-08-26 DIAGNOSIS — R63.2 BINGE EATING: Primary | ICD-10-CM

## 2024-08-26 DIAGNOSIS — F33.1 MODERATE EPISODE OF RECURRENT MAJOR DEPRESSIVE DISORDER: ICD-10-CM

## 2024-08-26 DIAGNOSIS — F90.2 ADHD (ATTENTION DEFICIT HYPERACTIVITY DISORDER), COMBINED TYPE: ICD-10-CM

## 2024-08-26 PROCEDURE — 99214 OFFICE O/P EST MOD 30 MIN: CPT | Mod: SA,HB,95, | Performed by: PHYSICIAN ASSISTANT

## 2024-08-26 PROCEDURE — 1159F MED LIST DOCD IN RCRD: CPT | Mod: CPTII,95,, | Performed by: PHYSICIAN ASSISTANT

## 2024-08-26 PROCEDURE — 1160F RVW MEDS BY RX/DR IN RCRD: CPT | Mod: CPTII,95,, | Performed by: PHYSICIAN ASSISTANT

## 2024-08-26 RX ORDER — HYDROXYZINE HYDROCHLORIDE 10 MG/1
10 TABLET, FILM COATED ORAL 3 TIMES DAILY PRN
Qty: 30 TABLET | Refills: 1 | Status: SHIPPED | OUTPATIENT
Start: 2024-08-26

## 2024-08-26 RX ORDER — LISDEXAMFETAMINE DIMESYLATE 70 MG/1
70 CAPSULE ORAL EVERY MORNING
Qty: 30 CAPSULE | Refills: 0 | Status: SHIPPED | OUTPATIENT
Start: 2024-11-15

## 2024-08-26 RX ORDER — LISDEXAMFETAMINE DIMESYLATE 70 MG/1
70 CAPSULE ORAL EVERY MORNING
Qty: 30 CAPSULE | Refills: 0 | Status: SHIPPED | OUTPATIENT
Start: 2024-10-15

## 2024-08-26 RX ORDER — LISDEXAMFETAMINE DIMESYLATE 70 MG/1
70 CAPSULE ORAL EVERY MORNING
Qty: 30 CAPSULE | Refills: 0 | Status: SHIPPED | OUTPATIENT
Start: 2024-09-15

## 2024-08-26 NOTE — PROGRESS NOTES
"The patient location is: [facility]  The chief complaint leading to consultation is: f/u    Visit type: audiovisual    Face to Face time with patient: 18  22 minutes of total time spent on the encounter, which includes face to face time and non-face to face time preparing to see the patient (eg, review of tests), Obtaining and/or reviewing separately obtained history, Documenting clinical information in the electronic or other health record, Independently interpreting results (not separately reported) and communicating results to the patient/family/caregiver, or Care coordination (not separately reported).         Each patient to whom he or she provides medical services by telemedicine is:  (1) informed of the relationship between the physician and patient and the respective role of any other health care provider with respect to management of the patient; and (2) notified that he or she may decline to receive medical services by telemedicine and may withdraw from such care at any time.    Notes:     Outpatient Psychiatry Follow-Up Visit (PA)    8/26/2024    Clinical Status of Patient:  Outpatient (Ambulatory)    Chief Complaint:  Nina Montesinos is a 29 y.o. female who presents today for follow-up of depression, anxiety and attention problems.  Met with patient.      Current Medications:  Vyvanse 70 mg  Effexor 75 mg  Hydroxyzine 10 mg    Interval History and Content of Current Session:  Interim Events/Subjective Report/Content of Current Session:   Hammad last seen 5/24/2024    Patient is a 29 year old female with a psychiatric history of depression, anxiety, PTSD, ADHD, and grief after losing a child.     Patient presents to follow up today after decreasing to hydroxyzine 10 mg prn for anxiety due to sedation.    Patient states she is "okay... just trying to figure out JULINANA for my daughter." She reports daughter is "off the wall... lots of behavioral issues" and getting in with JULIANNA has been stressful. 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    She reports her anxiety has been "not great" recently, with increase in over thinking, worrying, self isolation, racing thoughts, and rumination. She reports this was initially exacerbated when her mom almost lost the house, has yet to return to baseline.     She denies any depression at this time.     She continues to report benefit with medications and is not wanting to make any med changes.   She reports the lower dose of hydroxyzine is working well for anxiety without sedation.     She continues to report benefit with Vyvanse 70 mg, helps with focus, attention, executive functioning.   She denies ASE; no tachycardia, palpitations, or chest pain   She continues to report fatigue throughout the day, despite stimulant medication.   She also reports persistent binge eating due to stress.     She reports binge eating at night when the vyvanse has worn off. She also reports she is "constantly snacking throughout the day".  She reports that the last few months she wakes up "craving something sweet... and I don't even like sweets".     Patient continues to endorse stressors but is doing well and elects to make no med changes.   No SI/HI/self harm      Psychotherapy:  Target symptoms: anxiety , binge eating  Why chosen therapy is appropriate versus another modality: relevant to diagnosis, evidence based practice  Outcome monitoring methods: self-report  Therapeutic intervention type: supportive psychotherapy  Topics discussed/themes: building skills sets for symptom management, symptom recognition  The patient's response to the intervention is accepting. The patient's progress toward treatment goals is good.   Duration of intervention: 12 minutes.    Review of Systems   PSYCHIATRIC: Pertinant items are noted in the narrative.    Past Medication Trials:  Adderall-migraines  Mydais    Past Medical, Family " and Social History: The patient's past medical, family and social history have been reviewed and updated as appropriate within the electronic medical record - see encounter notes.    Compliance: yes    Side effects: None    Risk Parameters:  Patient reports no suicidal ideation  Patient reports no homicidal ideation  Patient reports no self-injurious behavior  Patient reports no violent behavior    Exam (detailed: at least 9 elements; comprehensive: all 15 elements)   Constitutional  Vitals:  Most recent vital signs, dated less than 90 days prior to this appointment, were reviewed.   There were no vitals filed for this visit.       General:  unremarkable, age appropriate, casually dressed     Musculoskeletal  Muscle Strength/Tone:  not examined   Gait & Station:  virtual     Psychiatric  Speech:  no latency; no press   Mood & Affect:  steady  congruent and appropriate   Thought Process:  normal and logical   Associations:  intact   Thought Content:  normal, no suicidality, no homicidality, delusions, or paranoia   Insight:  intact   Judgement: behavior is adequate to circumstances   Orientation:  grossly intact   Memory: intact for content of interview   Language: grossly intact   Attention Span & Concentration:  able to focus   Fund of Knowledge:  intact and appropriate to age and level of education     Assessment and Diagnosis   Status/Progress: Based on the examination today, the patient's problem(s) is/are adequately but not ideally controlled.  New problems have not been presented today.   Lack of compliance are not complicating management of the primary condition.  There are no active rule-out diagnoses for this patient at this time.     General Impression: Patient is a 29 year old female with a psychiatric history of XIOMARA, MDD, ADHD, PTSD, and grief after loss of a child. Patient reports benefit with effexor 75 mg, Vyvanse 70 mg, and hydroxyzine 10 mg prn. She continues to endorse binge eating, resources for  registered dietician provided    She is stable at this time      ICD-10-CM ICD-9-CM   1. Binge eating  R63.2 783.6   2. ADHD (attention deficit hyperactivity disorder), combined type  F90.2 314.01   3. Generalized anxiety disorder  F41.1 300.02   4. Moderate episode of recurrent major depressive disorder  F33.1 296.32                 Intervention/Counseling/Treatment Plan   Medication Management: The risks and benefits of medication were discussed with the patient.   Continue Effexor 75 mg daily  Continue Vyvanse 70 mg  Continue hydroxyzine 10 mg prn  Resources provided for binge eating  Discussed with patient informed consent, risks vs. benefits, alternative treatments, side effect profile and the inherent unpredictability of individual responses to these treatments. The patient expresses understanding of the above and displays the capacity to agree with this current plan and had no other questions.  Encouraged patient to keep future appointments.   Encouraged patient to message or call with questions or concerns  Safety plan reviewed with patient for worsening condition or suicidal ideations. In the event of an emergency patient was advised to go to the emergency room.      Return to Clinic: 3 months

## 2024-08-28 ENCOUNTER — PATIENT MESSAGE (OUTPATIENT)
Dept: PSYCHIATRY | Facility: CLINIC | Age: 29
End: 2024-08-28
Payer: MEDICAID

## 2024-09-12 ENCOUNTER — OFFICE VISIT (OUTPATIENT)
Dept: OBSTETRICS AND GYNECOLOGY | Facility: CLINIC | Age: 29
End: 2024-09-12
Attending: OBSTETRICS & GYNECOLOGY
Payer: MEDICAID

## 2024-09-12 VITALS
BODY MASS INDEX: 31.66 KG/M2 | WEIGHT: 167.69 LBS | HEIGHT: 61 IN | SYSTOLIC BLOOD PRESSURE: 120 MMHG | DIASTOLIC BLOOD PRESSURE: 90 MMHG

## 2024-09-12 DIAGNOSIS — Z12.4 ENCOUNTER FOR PAPANICOLAOU SMEAR FOR CERVICAL CANCER SCREENING: Primary | ICD-10-CM

## 2024-09-12 DIAGNOSIS — Z01.419 ENCOUNTER FOR GYNECOLOGICAL EXAMINATION (GENERAL) (ROUTINE) WITHOUT ABNORMAL FINDINGS: ICD-10-CM

## 2024-09-12 PROCEDURE — 99213 OFFICE O/P EST LOW 20 MIN: CPT | Mod: PBBFAC | Performed by: OBSTETRICS & GYNECOLOGY

## 2024-09-12 PROCEDURE — 88175 CYTOPATH C/V AUTO FLUID REDO: CPT | Performed by: OBSTETRICS & GYNECOLOGY

## 2024-09-12 PROCEDURE — 99999 PR PBB SHADOW E&M-EST. PATIENT-LVL III: CPT | Mod: PBBFAC,,, | Performed by: OBSTETRICS & GYNECOLOGY

## 2024-09-12 NOTE — PROGRESS NOTES
Subjective:       Patient ID: Nina Montesinos is a 29 y.o. female.    Chief Complaint:  Well Woman        History of Present Illness  Nina Montesinos is a 29 y.o. female  who presents for annual. She is doing well without the IUD and feels like she is better able to lose weight. She did not start the Nuvaring and does not want anything for contraception for now. Aware of the possibility of pregnancy. She reports intermittent bloating that she cannot pinpoint the cause. Not sure if food related. Does have constipation and can go days without going to the bathroom. Discussed. Periods are normal. Not sure if bloating is related to different parts of her cycle. Recommend keep a log of her symptoms and food intake. All questions answered.     Patient's last menstrual period was 2024 (exact date).   Date of Last Pap: 2024    Review of Systems  Review of Systems   Constitutional:  Negative for chills and fever.        Objective:   Physical Exam:   Constitutional: She is oriented to person, place, and time. Vital signs are normal. She appears well-developed and well-nourished. No distress.        Pulmonary/Chest: She exhibits no mass. Right breast exhibits no mass, no nipple discharge, no skin change, no tenderness, no bleeding and no swelling. Left breast exhibits no mass, no nipple discharge, no skin change, no tenderness, no bleeding and no swelling. Breasts are symmetrical.        Abdominal: Soft. Bowel sounds are normal. She exhibits no distension and no mass. There is no abdominal tenderness. There is no rebound.     Genitourinary:    Vagina and uterus normal.   There is no rash, tenderness, lesion or injury on the right labia. There is no rash, tenderness, lesion or injury on the left labia. Cervix is normal. Right adnexum displays no mass, no tenderness and no fullness. Left adnexum displays no mass, no tenderness and no fullness. No erythema, vaginal discharge, tenderness, rectocele,  cystocele or prolapse of vaginal walls in the vagina. Cervix exhibits no motion tenderness, no discharge and no friability. Uterus is not deviated, not enlarged, not fixed, not tender and not hosting fibroids.           Musculoskeletal: Normal range of motion and moves all extremeties.      Lymphadenopathy:        Right: No supraclavicular adenopathy present.        Left: No supraclavicular adenopathy present.    Neurological: She is alert and oriented to person, place, and time.    Skin: Skin is warm and dry.    Psychiatric: She has a normal mood and affect. Her behavior is normal. Judgment normal.        Assessment/ Plan:     1. Encounter for Papanicolaou smear for cervical cancer screening  Liquid-Based Pap Smear, Screening      2. Encounter for gynecological examination (general) (routine) without abnormal findings            No follow-ups on file.    As of April 1, 2021, the Cures Act has been passed nationally. This new law requires that all doctors progress notes, lab results, pathology reports and radiology reports be released IMMEDIATELY to the patient in the patient portal. That means that the results are released to you at the EXACT same time they are released to me. Therefore, with all of the patients that I have I am not able to reply to each patient exactly when the results come in. So there will be a delay from when you see the results to when I see them and have time to come up with a response to send you. Also I only see these results when I am on the computer at work. So if the results come in over the weekend or after 5 pm of a work day, I will not see them until the next business day. As you can tell, this is a challenge as a physician to give every patient the quick response they hope for and deserve. So please be patient! Thanks for understanding, Dr. Haney

## 2024-09-13 LAB
CLINICAL INFO: NORMAL
DATE OF PREVIOUS PAP: NORMAL
DATE PREVIOUS BX: NO
LMP START DATE: NORMAL
SPECIMEN SOURCE CVX/VAG CYTO: NORMAL

## 2024-09-23 ENCOUNTER — TELEPHONE (OUTPATIENT)
Dept: OBSTETRICS AND GYNECOLOGY | Facility: CLINIC | Age: 29
End: 2024-09-23
Payer: MEDICAID

## 2024-09-23 NOTE — TELEPHONE ENCOUNTER
----- Message from Duyen Haney MD sent at 9/23/2024 12:20 PM CDT -----  Sent message- abnormal pap, HPV positive, needs colpo    Please call patient to schedule colpo.

## 2024-10-15 ENCOUNTER — PATIENT MESSAGE (OUTPATIENT)
Dept: OBSTETRICS AND GYNECOLOGY | Facility: CLINIC | Age: 29
End: 2024-10-15
Payer: MEDICAID

## 2024-10-15 RX ORDER — FLUCONAZOLE 150 MG/1
150 TABLET ORAL DAILY
Qty: 2 TABLET | Refills: 0 | Status: SHIPPED | OUTPATIENT
Start: 2024-10-15 | End: 2024-10-17

## 2024-10-15 NOTE — TELEPHONE ENCOUNTER
Pt states Dr. Haney was supposed to send in something for a yeast infection at recent visit.  It doesn't look like it was sent.  Recommended an appt if no improvement in a week.     Diflucan pended

## 2024-10-30 ENCOUNTER — PROCEDURE VISIT (OUTPATIENT)
Dept: OBSTETRICS AND GYNECOLOGY | Facility: CLINIC | Age: 29
End: 2024-10-30
Attending: OBSTETRICS & GYNECOLOGY
Payer: MEDICAID

## 2024-10-30 VITALS — WEIGHT: 161.06 LBS | HEIGHT: 61 IN | BODY MASS INDEX: 30.41 KG/M2

## 2024-10-30 DIAGNOSIS — R87.610 ASCUS WITH POSITIVE HIGH RISK HPV CERVICAL: Primary | ICD-10-CM

## 2024-10-30 DIAGNOSIS — R87.810 ASCUS WITH POSITIVE HIGH RISK HPV CERVICAL: Primary | ICD-10-CM

## 2024-10-30 DIAGNOSIS — B37.9 YEAST INFECTION: ICD-10-CM

## 2024-10-30 PROCEDURE — 57454 BX/CURETT OF CERVIX W/SCOPE: CPT | Mod: PBBFAC | Performed by: OBSTETRICS & GYNECOLOGY

## 2024-10-30 PROCEDURE — 88305 TISSUE EXAM BY PATHOLOGIST: CPT | Mod: 59 | Performed by: PATHOLOGY

## 2024-10-30 RX ORDER — FLUCONAZOLE 150 MG/1
TABLET ORAL
Qty: 2 TABLET | Refills: 0 | Status: SHIPPED | OUTPATIENT
Start: 2024-10-30

## 2024-11-01 LAB
FINAL PATHOLOGIC DIAGNOSIS: NORMAL
GROSS: NORMAL
Lab: NORMAL

## 2024-11-20 ENCOUNTER — OFFICE VISIT (OUTPATIENT)
Dept: PSYCHIATRY | Facility: CLINIC | Age: 29
End: 2024-11-20
Payer: MEDICAID

## 2024-11-20 DIAGNOSIS — F41.1 GENERALIZED ANXIETY DISORDER: Primary | ICD-10-CM

## 2024-11-20 DIAGNOSIS — R63.2 BINGE EATING: ICD-10-CM

## 2024-11-20 DIAGNOSIS — F33.42 RECURRENT MAJOR DEPRESSIVE DISORDER, IN FULL REMISSION: ICD-10-CM

## 2024-11-20 DIAGNOSIS — F90.2 ADHD (ATTENTION DEFICIT HYPERACTIVITY DISORDER), COMBINED TYPE: ICD-10-CM

## 2024-11-20 RX ORDER — LISDEXAMFETAMINE DIMESYLATE 70 MG/1
70 CAPSULE ORAL EVERY MORNING
Qty: 30 CAPSULE | Refills: 0 | Status: SHIPPED | OUTPATIENT
Start: 2024-12-16

## 2024-11-20 RX ORDER — LISDEXAMFETAMINE DIMESYLATE 70 MG/1
70 CAPSULE ORAL EVERY MORNING
Qty: 30 CAPSULE | Refills: 0 | Status: SHIPPED | OUTPATIENT
Start: 2025-02-16

## 2024-11-20 RX ORDER — LISDEXAMFETAMINE DIMESYLATE 70 MG/1
70 CAPSULE ORAL EVERY MORNING
Qty: 30 CAPSULE | Refills: 0 | Status: SHIPPED | OUTPATIENT
Start: 2025-01-16

## 2024-11-20 RX ORDER — VENLAFAXINE HYDROCHLORIDE 37.5 MG/1
37.5 CAPSULE, EXTENDED RELEASE ORAL DAILY
Qty: 30 CAPSULE | Refills: 2 | Status: SHIPPED | OUTPATIENT
Start: 2024-11-20 | End: 2025-02-18

## 2024-11-20 NOTE — PROGRESS NOTES
"The patient location is: [facility]  The chief complaint leading to consultation is: f/u    Visit type: audiovisual    Face to Face time with patient: 18  22 minutes of total time spent on the encounter, which includes face to face time and non-face to face time preparing to see the patient (eg, review of tests), Obtaining and/or reviewing separately obtained history, Documenting clinical information in the electronic or other health record, Independently interpreting results (not separately reported) and communicating results to the patient/family/caregiver, or Care coordination (not separately reported).         Each patient to whom he or she provides medical services by telemedicine is:  (1) informed of the relationship between the physician and patient and the respective role of any other health care provider with respect to management of the patient; and (2) notified that he or she may decline to receive medical services by telemedicine and may withdraw from such care at any time.    Notes:     Outpatient Psychiatry Follow-Up Visit (PA)    11/20/2024    Clinical Status of Patient:  Outpatient (Ambulatory)    Chief Complaint:  Nina Montesinos is a 29 y.o. female who presents today for follow-up of depression, anxiety and attention problems.  Met with patient.      Current Medications:  Vyvanse 70 mg  Effexor 75 mg  Hydroxyzine 10 mg    Interval History and Content of Current Session:  Interim Events/Subjective Report/Content of Current Session:   Hammad last seen 8/26/2024    Patient is a 29 year old female with a psychiatric history of depression, anxiety, PTSD, ADHD, and grief after losing a child.     Patient presents to follow up today stating she's been doing "really good actually".    Her mood has been "good" and she denies any depression.    She reports recent increase in anxiety, feels trigger was a relationship. Anxiety has since improved post break up and with increased exercise. She continues " "to endorse fluctuating anxiety, reports improvement for days, then returns with trigger. She reports taking hydroxyzine prn for acute anxiety and does not improvement when using. She reports decreased need, decreased panic attacks.     Work has been "okay". 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     She continues to endorse benefit with vyvanse 70 mg, helps with focus/attention, task completion, productivity, decreased binge eating.   She denies ASE; no tachycardia, palpitations, or chest pain    She reports fluctuating appetite, with increased cravings for chocolate, "like an addiction". This is better controlled during the day with vyvanse but worse at night. Reviewed options for dietician to address cravings/binge eating.   She notes she has been eating better and exercising more, with intentional weight loss.     Today, patient is wanting to decrease Effexor as she has been feeling "really good." Reviewed risk for increased anxiety at lower dose. Patient would like to trial lower dose.     No SI/HI/self harm      Psychotherapy:  Target symptoms: anxiety , binge eating  Why chosen therapy is appropriate versus another modality: relevant to diagnosis, evidence based practice  Outcome monitoring methods: self-report  Therapeutic intervention type: supportive psychotherapy  Topics discussed/themes: building skills sets for symptom management, symptom recognition  The patient's response to the intervention is accepting. The patient's progress toward treatment goals is good.   Duration of intervention: 10 minutes.    Review of Systems   PSYCHIATRIC: Pertinant items are noted in the narrative.    Past Medication Trials:  Adderall-migraines  Mydais    Past Medical, Family and Social History: The patient's past medical, family and social history have been reviewed and updated as appropriate within the electronic medical record - see encounter notes.    Compliance: yes    Side effects: None    Risk " Parameters:  Patient reports no suicidal ideation  Patient reports no homicidal ideation  Patient reports no self-injurious behavior  Patient reports no violent behavior    Exam (detailed: at least 9 elements; comprehensive: all 15 elements)   Constitutional  Vitals:  Most recent vital signs, dated less than 90 days prior to this appointment, were reviewed.   There were no vitals filed for this visit.       General:  unremarkable, age appropriate, casually dressed     Musculoskeletal  Muscle Strength/Tone:  not examined   Gait & Station:  virtual     Psychiatric  Speech:  no latency; no press   Mood & Affect:  steady  congruent and appropriate   Thought Process:  normal and logical   Associations:  intact   Thought Content:  normal, no suicidality, no homicidality, delusions, or paranoia   Insight:  intact   Judgement: behavior is adequate to circumstances   Orientation:  grossly intact   Memory: intact for content of interview   Language: grossly intact   Attention Span & Concentration:  able to focus   Fund of Knowledge:  intact and appropriate to age and level of education     Assessment and Diagnosis   Status/Progress: Based on the examination today, the patient's problem(s) is/are adequately but not ideally controlled.  New problems have not been presented today.   Lack of compliance are not complicating management of the primary condition.  There are no active rule-out diagnoses for this patient at this time.     General Impression: Patient is a 29 year old female with a psychiatric history of XIOMARA, MDD, ADHD, PTSD, and grief after loss of a child. Patient reports benefit with effexor 75 mg, Vyvanse 70 mg, and hydroxyzine 10 mg prn. Today, patient is wanting to decrease to effexor 37.5 mg due to well controlled mood and anxiety.     She continues to endorse binge eating, resources for registered dietician provided    She is stable at this time      ICD-10-CM ICD-9-CM   1. Generalized anxiety disorder  F41.1  300.02   2. Recurrent major depressive disorder, in full remission  F33.42 296.36   3. ADHD (attention deficit hyperactivity disorder), combined type  F90.2 314.01   4. Binge eating  R63.2 783.6                   Intervention/Counseling/Treatment Plan   Medication Management: The risks and benefits of medication were discussed with the patient.   Decrease to Effexor 37.5 mg daily  Reviewed risk for increase in generalized anxiety  Continue Vyvanse 70 mg  Continue hydroxyzine 10 mg prn  Discussed with patient informed consent, risks vs. benefits, alternative treatments, side effect profile and the inherent unpredictability of individual responses to these treatments. The patient expresses understanding of the above and displays the capacity to agree with this current plan and had no other questions.  Encouraged patient to keep future appointments.   Encouraged patient to message or call with questions or concerns  Safety plan reviewed with patient for worsening condition or suicidal ideations. In the event of an emergency patient was advised to go to the emergency room.      Return to Clinic: 3 months

## 2025-02-26 DIAGNOSIS — N76.6 VULVAR ULCER: ICD-10-CM

## 2025-02-27 RX ORDER — VALACYCLOVIR HYDROCHLORIDE 1 G/1
TABLET, FILM COATED ORAL
Qty: 90 TABLET | Refills: 1 | Status: SHIPPED | OUTPATIENT
Start: 2025-02-27

## 2025-02-27 NOTE — TELEPHONE ENCOUNTER
Refill Decision Note   Nina Montesinos  is requesting a refill authorization.  Brief Assessment and Rationale for Refill:  Approve     Medication Therapy Plan:         Comments:     Note composed:8:37 AM 02/27/2025

## 2025-03-07 ENCOUNTER — OFFICE VISIT (OUTPATIENT)
Dept: PSYCHIATRY | Facility: CLINIC | Age: 30
End: 2025-03-07
Payer: COMMERCIAL

## 2025-03-07 DIAGNOSIS — F90.2 ADHD (ATTENTION DEFICIT HYPERACTIVITY DISORDER), COMBINED TYPE: ICD-10-CM

## 2025-03-07 DIAGNOSIS — F33.42 RECURRENT MAJOR DEPRESSIVE DISORDER, IN FULL REMISSION: ICD-10-CM

## 2025-03-07 DIAGNOSIS — F41.1 GENERALIZED ANXIETY DISORDER: Primary | ICD-10-CM

## 2025-03-07 RX ORDER — LISDEXAMFETAMINE DIMESYLATE 70 MG/1
70 CAPSULE ORAL EVERY MORNING
Qty: 30 CAPSULE | Refills: 0 | Status: SHIPPED | OUTPATIENT
Start: 2025-04-07

## 2025-03-07 RX ORDER — LISDEXAMFETAMINE DIMESYLATE 70 MG/1
70 CAPSULE ORAL EVERY MORNING
Qty: 30 CAPSULE | Refills: 0 | Status: SHIPPED | OUTPATIENT
Start: 2025-05-07

## 2025-03-07 RX ORDER — LISDEXAMFETAMINE DIMESYLATE 70 MG/1
70 CAPSULE ORAL EVERY MORNING
Qty: 30 CAPSULE | Refills: 0 | Status: SHIPPED | OUTPATIENT
Start: 2025-03-07 | End: 2025-03-13 | Stop reason: SDUPTHER

## 2025-03-07 NOTE — PROGRESS NOTES
"The patient location is: [place]  The chief complaint leading to consultation is: f/u    Visit type: audiovisual    Face to Face time with patient: 16  24 minutes of total time spent on the encounter, which includes face to face time and non-face to face time preparing to see the patient (eg, review of tests), Obtaining and/or reviewing separately obtained history, Documenting clinical information in the electronic or other health record, Independently interpreting results (not separately reported) and communicating results to the patient/family/caregiver, or Care coordination (not separately reported).         Each patient to whom he or she provides medical services by telemedicine is:  (1) informed of the relationship between the physician and patient and the respective role of any other health care provider with respect to management of the patient; and (2) notified that he or she may decline to receive medical services by telemedicine and may withdraw from such care at any time.    Notes:     Outpatient Psychiatry Follow-Up Visit ([place])    3/7/2025    Clinical Status of Patient:  Outpatient (Ambulatory)    Chief Complaint:  Nina Montesinos is a 29 y.o. female who presents today for follow-up of depression, anxiety and attention problems.  Met with patient.      Current Medications:  Vyvanse 70 mg  Hydroxyzine 10 mg    Interval History and Content of Current Session:  Interim Events/Subjective Report/Content of Current Session:   Hammad last seen 11/20/2024    Patient is a 29 year old female with a psychiatric history of depression, anxiety, PTSD, ADHD, and grief after losing a child.     Patient presents to follow up today after decreasing to effexor 37.5 mg, today states she has been without all medications due to loss of insurance. She estimates being off of medications since November, only recently has insurance again this month.     Her mood has been "all over the place", with "no energy " "whatsoever." She denies any depression but endorses increased emotional reactivity without the medications.  She reports ADHD symptoms have been uncontrolled, difficulty with task initiation/completion, forgetfulness, low energy/motivation, and increased impulsivity.     She reports anxiety has been "not that bad".She endorses some panic attacks, estimates 3 in last few months    She is without a job at this time, family business is going under.   She plans to take courses for medical coding while working.     She is in a new relationship, has been supportive.     She has not been binge eating despite lack of vyvanse, "has been a big suprise"  She is still going to the gym regularly.     She reports sleep has been fluctuating, increased sleep/napping without vyvanse.     No SI/HI/self harm    Patient feels she is doing well without effexor/SSRI/SNRI therapy but would like to restart vyvanse.     Psychotherapy:  Target symptoms: distractability, lack of focus, anxiety   Why chosen therapy is appropriate versus another modality: relevant to diagnosis, evidence based practice  Outcome monitoring methods: self-report, observation  Therapeutic intervention type: supportive psychotherapy  Topics discussed/themes: building skills sets for symptom management, symptom recognition, financial stressors  The patient's response to the intervention is accepting. The patient's progress toward treatment goals is fair.   Duration of intervention: 12 minutes.    Review of Systems   PSYCHIATRIC: Pertinant items are noted in the narrative.    Past Medication Trials:  Adderall-migraines  Mydais- lasted too long    Past Medical, Family and Social History: The patient's past medical, family and social history have been reviewed and updated as appropriate within the electronic medical record - see encounter notes.    Compliance: no, without medications since Nov due to insurance    Side effects: None    Risk Parameters:  Patient reports no " suicidal ideation  Patient reports no homicidal ideation  Patient reports no self-injurious behavior  Patient reports no violent behavior    Exam (detailed: at least 9 elements; comprehensive: all 15 elements)   Constitutional  Vitals:  Most recent vital signs, dated greater than 90 days prior to this appointment, were reviewed.   There were no vitals filed for this visit.       General:  unremarkable, age appropriate, casually dressed     Musculoskeletal  Muscle Strength/Tone:  not examined   Gait & Station:  virtual     Psychiatric  Speech:  no latency; no press   Mood & Affect:  steady  congruent and appropriate   Thought Process:  normal and logical   Associations:  intact   Thought Content:  normal, no suicidality, no homicidality, delusions, or paranoia   Insight:  intact   Judgement: behavior is adequate to circumstances   Orientation:  grossly intact   Memory: intact for content of interview   Language: grossly intact   Attention Span & Concentration:  able to focus   Fund of Knowledge:  intact and appropriate to age and level of education     Assessment and Diagnosis   Status/Progress: Based on the examination today, the patient's problem(s) is/are inadequately controlled.  New problems have not been presented today.   Lack of compliance are complicating management of the primary condition.  There are no active rule-out diagnoses for this patient at this time.     General Impression: Patient is a 29 year old female with a psychiatric history of XIOMARA, MDD, ADHD, PTSD, and grief after loss of a child. Patient presents today reporting she has been without medication for months due to lack of insurance. She is not wanting to restart effexor but will restart vyvanse to address ADHD symptoms, mood.     She is stable at this time      ICD-10-CM ICD-9-CM   1. Generalized anxiety disorder  F41.1 300.02   2. ADHD (attention deficit hyperactivity disorder), combined type  F90.2 314.01   3. Recurrent major depressive  disorder, in full remission  F33.42 296.36           Intervention/Counseling/Treatment Plan   Medication Management: The risks and benefits of medication were discussed with the patient.   Restart Vyvanse 70 mg  Patient has no contraindications: no h/o allergic rxn, agitation, anxiety, tourette's, arrythmia, cardiovascular disease, cardiac structural abnormalities, hyperthyroidism, glaucoma, Other psychiatric illness, etc.   Discussed diagnosis, risks and benefits of proposed treatment vs alternative treatments vs no treatment, and potential side effects of these treatments (dependency,  HTN, MI, stroke, arrythmia, anaphylaxis or other allergic reactions, nervousness, anorexia, insomnia, tachycardia, palpitations, dizziness, BP changes, HR changes,  etc.). The patient expresses understanding of the above and displays the capacity to agree with this treatment given said understanding. Patient also agrees that, currently, the benefits outweigh the risks and would like to pursue treatment at this time.   Discussed with patient informed consent, risks vs. benefits, alternative treatments, side effect profile and the inherent unpredictability of individual responses to these treatments. The patient expresses understanding of the above and displays the capacity to agree with this current plan and had no other questions.  Encouraged patient to keep future appointments.   Encouraged patient to message or call with questions or concerns  Safety plan reviewed with patient for worsening condition or suicidal ideations. In the event of an emergency patient was advised to go to the emergency room.      Return to Clinic: 2 months

## 2025-03-13 DIAGNOSIS — F90.2 ADHD (ATTENTION DEFICIT HYPERACTIVITY DISORDER), COMBINED TYPE: ICD-10-CM

## 2025-03-13 RX ORDER — LISDEXAMFETAMINE DIMESYLATE 70 MG/1
70 CAPSULE ORAL EVERY MORNING
Qty: 30 CAPSULE | Refills: 0 | Status: SHIPPED | OUTPATIENT
Start: 2025-03-13

## 2025-05-20 ENCOUNTER — PATIENT MESSAGE (OUTPATIENT)
Dept: PSYCHIATRY | Facility: CLINIC | Age: 30
End: 2025-05-20
Payer: COMMERCIAL

## 2025-05-20 ENCOUNTER — DOCUMENTATION ONLY (OUTPATIENT)
Dept: PSYCHIATRY | Facility: CLINIC | Age: 30
End: 2025-05-20
Payer: COMMERCIAL

## 2025-07-21 ENCOUNTER — PATIENT MESSAGE (OUTPATIENT)
Dept: PSYCHIATRY | Facility: CLINIC | Age: 30
End: 2025-07-21
Payer: COMMERCIAL

## (undated) DEVICE — NDL 18GA X1 1/2 REG BEVEL

## (undated) DEVICE — DRESSING XEROFORM 1X8IN

## (undated) DEVICE — DRESSING XEROFORM FOIL PK 1X8

## (undated) DEVICE — PAD CAST SPECIALIST STRL 6

## (undated) DEVICE — NDL 2.0 ORTHOCORD W/DBL ARM

## (undated) DEVICE — SYS CLSR DERMABOND PRINEO 22CM

## (undated) DEVICE — SEE MEDLINE ITEM 152523

## (undated) DEVICE — APPLICATOR CHLORAPREP ORN 26ML

## (undated) DEVICE — SPONGE LAP 18X18 PREWASHED

## (undated) DEVICE — BLADE SHAVER LANZA 4.2X13CM

## (undated) DEVICE — PAD ABD 8X10 STERILE

## (undated) DEVICE — SUT VICRYL CTD 2-0 GI 27 SH

## (undated) DEVICE — TUBE SET INFLOW/OUTFLOW

## (undated) DEVICE — SUT ETHILON 3-0 PS2 18 BLK

## (undated) DEVICE — SUT 0 VICRYL PLUS CT-1 27IN

## (undated) DEVICE — BLADE SHAVER 4.5 6/BX

## (undated) DEVICE — PUSHER CUTTER KNOT FAST FX STR

## (undated) DEVICE — CAUTERY BOVIE PENCIL

## (undated) DEVICE — SEE MEDLINE ITEM 152530

## (undated) DEVICE — TOURNIQUET SB QC DP 34X4IN

## (undated) DEVICE — DRAPE IOBAN 6635

## (undated) DEVICE — SEE MEDLINE ITEM 146231

## (undated) DEVICE — PAD ABDOMINAL 5X9 STERILE

## (undated) DEVICE — PROBE ARTHSCP EDGE ENERGY 50

## (undated) DEVICE — SUT VICRYL 3-0 27 SH

## (undated) DEVICE — POSITIONER IV ARMBOARD FOAM

## (undated) DEVICE — COVER MAYO STAND REINFRCD 30

## (undated) DEVICE — BRACE KNEE T SCOPE PREMIER

## (undated) DEVICE — PAD COLD THERAPY KNEE WRAP ON

## (undated) DEVICE — SOL IRR NACL .9% 3000ML

## (undated) DEVICE — DRAPE PLASTIC U 60X72

## (undated) DEVICE — SEE MEDLINE ITEM 157150

## (undated) DEVICE — Device

## (undated) DEVICE — GAUZE SPONGE 4X4 12PLY